# Patient Record
Sex: MALE | Race: WHITE | NOT HISPANIC OR LATINO | URBAN - METROPOLITAN AREA
[De-identification: names, ages, dates, MRNs, and addresses within clinical notes are randomized per-mention and may not be internally consistent; named-entity substitution may affect disease eponyms.]

---

## 2020-08-04 ENCOUNTER — INPATIENT (INPATIENT)
Facility: HOSPITAL | Age: 75
LOS: 14 days | Discharge: HOME HEALTH | End: 2020-08-19
Attending: STUDENT IN AN ORGANIZED HEALTH CARE EDUCATION/TRAINING PROGRAM | Admitting: STUDENT IN AN ORGANIZED HEALTH CARE EDUCATION/TRAINING PROGRAM
Payer: MEDICARE

## 2020-08-04 VITALS
RESPIRATION RATE: 18 BRPM | SYSTOLIC BLOOD PRESSURE: 167 MMHG | TEMPERATURE: 99 F | OXYGEN SATURATION: 100 % | DIASTOLIC BLOOD PRESSURE: 102 MMHG | HEART RATE: 100 BPM

## 2020-08-04 DIAGNOSIS — Z29.9 ENCOUNTER FOR PROPHYLACTIC MEASURES, UNSPECIFIED: ICD-10-CM

## 2020-08-04 DIAGNOSIS — Z94.5 SKIN TRANSPLANT STATUS: Chronic | ICD-10-CM

## 2020-08-04 DIAGNOSIS — M54.5 LOW BACK PAIN: ICD-10-CM

## 2020-08-04 DIAGNOSIS — K60.3 ANAL FISTULA: ICD-10-CM

## 2020-08-04 DIAGNOSIS — Z02.9 ENCOUNTER FOR ADMINISTRATIVE EXAMINATIONS, UNSPECIFIED: ICD-10-CM

## 2020-08-04 DIAGNOSIS — J30.2 OTHER SEASONAL ALLERGIC RHINITIS: ICD-10-CM

## 2020-08-04 LAB
ALBUMIN SERPL ELPH-MCNC: 3.6 G/DL — SIGNIFICANT CHANGE UP (ref 3.3–5)
ALP SERPL-CCNC: 107 U/L — SIGNIFICANT CHANGE UP (ref 40–120)
ALT FLD-CCNC: 12 U/L — SIGNIFICANT CHANGE UP (ref 4–41)
ANION GAP SERPL CALC-SCNC: 12 MMO/L — SIGNIFICANT CHANGE UP (ref 7–14)
APTT BLD: 35.8 SEC — SIGNIFICANT CHANGE UP (ref 27–36.3)
AST SERPL-CCNC: 15 U/L — SIGNIFICANT CHANGE UP (ref 4–40)
BASOPHILS # BLD AUTO: 0.07 K/UL — SIGNIFICANT CHANGE UP (ref 0–0.2)
BASOPHILS NFR BLD AUTO: 0.6 % — SIGNIFICANT CHANGE UP (ref 0–2)
BILIRUB SERPL-MCNC: 0.7 MG/DL — SIGNIFICANT CHANGE UP (ref 0.2–1.2)
BLD GP AB SCN SERPL QL: NEGATIVE — SIGNIFICANT CHANGE UP
BUN SERPL-MCNC: 11 MG/DL — SIGNIFICANT CHANGE UP (ref 7–23)
CALCIUM SERPL-MCNC: 9.8 MG/DL — SIGNIFICANT CHANGE UP (ref 8.4–10.5)
CHLORIDE SERPL-SCNC: 101 MMOL/L — SIGNIFICANT CHANGE UP (ref 98–107)
CO2 SERPL-SCNC: 28 MMOL/L — SIGNIFICANT CHANGE UP (ref 22–31)
CREAT SERPL-MCNC: 0.93 MG/DL — SIGNIFICANT CHANGE UP (ref 0.5–1.3)
EOSINOPHIL # BLD AUTO: 0.06 K/UL — SIGNIFICANT CHANGE UP (ref 0–0.5)
EOSINOPHIL NFR BLD AUTO: 0.5 % — SIGNIFICANT CHANGE UP (ref 0–6)
GLUCOSE SERPL-MCNC: 104 MG/DL — HIGH (ref 70–99)
HCT VFR BLD CALC: 49.8 % — SIGNIFICANT CHANGE UP (ref 39–50)
HGB BLD-MCNC: 16.8 G/DL — SIGNIFICANT CHANGE UP (ref 13–17)
IMM GRANULOCYTES NFR BLD AUTO: 0.4 % — SIGNIFICANT CHANGE UP (ref 0–1.5)
INR BLD: 1.28 — HIGH (ref 0.88–1.17)
LYMPHOCYTES # BLD AUTO: 1.17 K/UL — SIGNIFICANT CHANGE UP (ref 1–3.3)
LYMPHOCYTES # BLD AUTO: 9.7 % — LOW (ref 13–44)
MCHC RBC-ENTMCNC: 32.2 PG — SIGNIFICANT CHANGE UP (ref 27–34)
MCHC RBC-ENTMCNC: 33.7 % — SIGNIFICANT CHANGE UP (ref 32–36)
MCV RBC AUTO: 95.4 FL — SIGNIFICANT CHANGE UP (ref 80–100)
MONOCYTES # BLD AUTO: 0.95 K/UL — HIGH (ref 0–0.9)
MONOCYTES NFR BLD AUTO: 7.9 % — SIGNIFICANT CHANGE UP (ref 2–14)
NEUTROPHILS # BLD AUTO: 9.72 K/UL — HIGH (ref 1.8–7.4)
NEUTROPHILS NFR BLD AUTO: 80.9 % — HIGH (ref 43–77)
NRBC # FLD: 0 K/UL — SIGNIFICANT CHANGE UP (ref 0–0)
PLATELET # BLD AUTO: 284 K/UL — SIGNIFICANT CHANGE UP (ref 150–400)
PMV BLD: 10 FL — SIGNIFICANT CHANGE UP (ref 7–13)
POTASSIUM SERPL-MCNC: 5.3 MMOL/L — SIGNIFICANT CHANGE UP (ref 3.5–5.3)
POTASSIUM SERPL-SCNC: 5.3 MMOL/L — SIGNIFICANT CHANGE UP (ref 3.5–5.3)
PROT SERPL-MCNC: 7.7 G/DL — SIGNIFICANT CHANGE UP (ref 6–8.3)
PROTHROM AB SERPL-ACNC: 14.5 SEC — HIGH (ref 10.6–13.6)
RBC # BLD: 5.22 M/UL — SIGNIFICANT CHANGE UP (ref 4.2–5.8)
RBC # FLD: 13.7 % — SIGNIFICANT CHANGE UP (ref 10.3–14.5)
RH IG SCN BLD-IMP: NEGATIVE — SIGNIFICANT CHANGE UP
SODIUM SERPL-SCNC: 141 MMOL/L — SIGNIFICANT CHANGE UP (ref 135–145)
WBC # BLD: 12.02 K/UL — HIGH (ref 3.8–10.5)
WBC # FLD AUTO: 12.02 K/UL — HIGH (ref 3.8–10.5)

## 2020-08-04 PROCEDURE — 99223 1ST HOSP IP/OBS HIGH 75: CPT

## 2020-08-04 PROCEDURE — 99284 EMERGENCY DEPT VISIT MOD MDM: CPT

## 2020-08-04 PROCEDURE — 74177 CT ABD & PELVIS W/CONTRAST: CPT | Mod: 26

## 2020-08-04 RX ORDER — HEPARIN SODIUM 5000 [USP'U]/ML
5000 INJECTION INTRAVENOUS; SUBCUTANEOUS EVERY 12 HOURS
Refills: 0 | Status: COMPLETED | OUTPATIENT
Start: 2020-08-04 | End: 2020-08-06

## 2020-08-04 RX ORDER — VANCOMYCIN HCL 1 G
1000 VIAL (EA) INTRAVENOUS EVERY 12 HOURS
Refills: 0 | Status: DISCONTINUED | OUTPATIENT
Start: 2020-08-05 | End: 2020-08-05

## 2020-08-04 RX ORDER — VANCOMYCIN HCL 1 G
VIAL (EA) INTRAVENOUS
Refills: 0 | Status: DISCONTINUED | OUTPATIENT
Start: 2020-08-04 | End: 2020-08-05

## 2020-08-04 RX ORDER — ENOXAPARIN SODIUM 100 MG/ML
40 INJECTION SUBCUTANEOUS DAILY
Refills: 0 | Status: DISCONTINUED | OUTPATIENT
Start: 2020-08-04 | End: 2020-08-04

## 2020-08-04 RX ORDER — PIPERACILLIN AND TAZOBACTAM 4; .5 G/20ML; G/20ML
3.38 INJECTION, POWDER, LYOPHILIZED, FOR SOLUTION INTRAVENOUS EVERY 8 HOURS
Refills: 0 | Status: DISCONTINUED | OUTPATIENT
Start: 2020-08-04 | End: 2020-08-05

## 2020-08-04 RX ORDER — LORATADINE 10 MG/1
10 TABLET ORAL DAILY
Refills: 0 | Status: DISCONTINUED | OUTPATIENT
Start: 2020-08-04 | End: 2020-08-19

## 2020-08-04 RX ORDER — SODIUM CHLORIDE 9 MG/ML
3 INJECTION INTRAMUSCULAR; INTRAVENOUS; SUBCUTANEOUS EVERY 8 HOURS
Refills: 0 | Status: DISCONTINUED | OUTPATIENT
Start: 2020-08-04 | End: 2020-08-08

## 2020-08-04 RX ORDER — VANCOMYCIN HCL 1 G
1000 VIAL (EA) INTRAVENOUS ONCE
Refills: 0 | Status: COMPLETED | OUTPATIENT
Start: 2020-08-04 | End: 2020-08-04

## 2020-08-04 RX ORDER — PIPERACILLIN AND TAZOBACTAM 4; .5 G/20ML; G/20ML
3.38 INJECTION, POWDER, LYOPHILIZED, FOR SOLUTION INTRAVENOUS ONCE
Refills: 0 | Status: COMPLETED | OUTPATIENT
Start: 2020-08-04 | End: 2020-08-04

## 2020-08-04 RX ORDER — ACETAMINOPHEN 500 MG
650 TABLET ORAL EVERY 6 HOURS
Refills: 0 | Status: DISCONTINUED | OUTPATIENT
Start: 2020-08-04 | End: 2020-08-14

## 2020-08-04 RX ADMIN — PIPERACILLIN AND TAZOBACTAM 3.38 GRAM(S): 4; .5 INJECTION, POWDER, LYOPHILIZED, FOR SOLUTION INTRAVENOUS at 18:17

## 2020-08-04 RX ADMIN — Medication 250 MILLIGRAM(S): at 21:45

## 2020-08-04 RX ADMIN — PIPERACILLIN AND TAZOBACTAM 25 GRAM(S): 4; .5 INJECTION, POWDER, LYOPHILIZED, FOR SOLUTION INTRAVENOUS at 23:15

## 2020-08-04 RX ADMIN — SODIUM CHLORIDE 3 MILLILITER(S): 9 INJECTION INTRAMUSCULAR; INTRAVENOUS; SUBCUTANEOUS at 21:42

## 2020-08-04 RX ADMIN — PIPERACILLIN AND TAZOBACTAM 200 GRAM(S): 4; .5 INJECTION, POWDER, LYOPHILIZED, FOR SOLUTION INTRAVENOUS at 17:40

## 2020-08-04 NOTE — H&P ADULT - NSICDXPASTSURGICALHX_GEN_ALL_CORE_FT
PAST SURGICAL HISTORY:  Basal Cell Carcinoma of Anterior Chest; s/p MOHS surgery 8/10 proximal to suprasternal notch     H/O skin graft

## 2020-08-04 NOTE — H&P ADULT - PROBLEM SELECTOR PLAN 6
VTE with Heparin Sub cut BID.  Fall, Aspiration, safety precautions. DVT ppx - HSQ  Diet - Regular diet  Activity - Ambulate as tolerated - Continue Claritin.

## 2020-08-04 NOTE — CONSULT NOTE ADULT - ATTENDING COMMENTS
Keisha gentlemen presenting w/ back pain and anal drainage. Concern for possible metastatic process on work-up as well as complex perianal fistula. On exam pt w/ large external opening on R side. Mild skin irritation but otherwise normal appearing skin.    - Will plan for EUA, possible biopsy, possible seton  - Pt needs colonoscopy   - Start on sitz bath, barrier cream (e.g. zinc oxide, calmoseptine) and dry gauze changed regularly

## 2020-08-04 NOTE — H&P ADULT - NSHPPHYSICALEXAM_GEN_ALL_CORE
Vital Signs Last 24 Hrs  T(C): 36.2 (04 Aug 2020 23:33), Max: 37 (04 Aug 2020 11:06)  T(F): 97.2 (04 Aug 2020 23:33), Max: 98.6 (04 Aug 2020 11:06)  HR: 75 (04 Aug 2020 23:33) (75 - 100)  BP: 144/80 (04 Aug 2020 23:33) (124/78 - 167/102)  BP(mean): --  RR: 18 (04 Aug 2020 23:33) (16 - 18)  SpO2: 97% (04 Aug 2020 23:33) (97% - 100%)

## 2020-08-04 NOTE — H&P ADULT - NEGATIVE RESPIRATORY AND THORAX SYMPTOMS
no cough/no wheezing/no hemoptysis/no dyspnea no cough/no hemoptysis/no pleuritic chest pain/no wheezing/no dyspnea

## 2020-08-04 NOTE — ED PROVIDER NOTE - OBJECTIVE STATEMENT
75yM w/pmhx basal cell carcinoma on nose presenting with low back pain and rectal discharge x 1 month. Pt states one month ago he developed low back pain, initially mild and bilateral. Reports pain is worse with standing from sitting and certain positional changes. Pt reports he then developed "rectal discharge", unable to further characterize, states throughout the day he feels drainage from the rectum. Pt states he has not had a normal BM in 1 month. Pt reports pain to hemorrhoids as well. Denies fever/chills, weakness, numbness/tingling, abdominal pain, vomiting, diarrhea, saddle anesthesia, urinary incontinence, difficulty ambulating, hx IVDU or any other concerns.

## 2020-08-04 NOTE — H&P ADULT - NEGATIVE CARDIOVASCULAR SYMPTOMS
no dyspnea on exertion/no chest pain/no palpitations/no orthopnea no chest pain/no dyspnea on exertion/no palpitations/no orthopnea/no peripheral edema

## 2020-08-04 NOTE — ED PROVIDER NOTE - ATTENDING CONTRIBUTION TO CARE
agree with above hpi  Constant rectal discharge and possible incontinence, h/o large hemorrhoids, basal cell CA.  No fevers, chills, ha, cp, sob, abd pain, vomiting, diarrhea, dysuria, weakness, numbness, urinary incontinence.   Does note moderate intermittent lower back pain positionally related, none at this time.  No weight loss, no ivdu.  on exam  GEN - NAD; well appearing; A+O x3   HEAD - NC/AT   EYES- PERRL, EOMI  ENT: Airway patent, mmm, Oral cavity and pharynx normal.    NECK: Neck supple  PULMONARY - CTA b/l, symmetric breath sounds.   CARDIAC -s1s2, RRR, no M,G,R  ABDOMEN - +BS, ND, NT, soft, no guarding, no rebound, no masses   Rectal exam as above  BACK - no CVA tenderness, Normal  spine, no midline ttp, no paraspinal ttp, no stepoff/deformity  EXTREMITIES - FROM, symmetric pulses, capillary refill < 2 seconds, no edema   SKIN - no rash or bruising   NEUROLOGIC - alert, speech clear, 5/5 strength b/l ue and le, sensation intact  a/p-patient with 1m rectal incontinence/discharge-found to have possible fistulization in region on exam with large hemorrhoids, will ct to further evaluate-abd nontender, no systemic symptoms, normal back exam.

## 2020-08-04 NOTE — ED PROVIDER NOTE - PHYSICAL EXAMINATION
Rectal: fistula with spontaneous feculent drainage noted near rectum, rectum TTP with external hemorrhoids

## 2020-08-04 NOTE — CONSULT NOTE ADULT - SUBJECTIVE AND OBJECTIVE BOX
[facility] Colorectal Surgery Consultation     Patient is a 75y old  Male who presents with a chief complaint of rectal discharge    HPI:  75M w/ pmhx basal cell carcinoma presenting with low back pain and rectal discharge. Reports lower back pain and rectal discharge began about 1 month ago. Reports low back pain was initially mild and bilateral, now worse with standing from sitting and certain positional changes. Pt reports he then developed brown rectal discharge unable to further characterize, throughout the day he notices significant feculent drainage. Pt states he has not had a normal BM in 1 month. Pt reports pain to hemorrhoids as well. Denies fever/chills, weakness, numbness/tingling, abdominal pain, vomiting, recent weight loss. Denies family history of malignancy. In ED, CT scan demonstrating complex anal fistula, possible discitis OM of lumbar spine, and lung lesions concerning for metastatic disease. Surgery consulted for evaluation.      PAST MEDICAL & SURGICAL HISTORY:  H/O: Obesity  Basal Cell Cancer; Right side distal nose  Seasonal Allergies  Basal Cell Carcinoma of Anterior Chest; s/p MOHS surgery 8/10 proximal to suprasternal notch      FAMILY HISTORY:      SOCIAL HISTORY:    MEDICATIONS  (STANDING):    MEDICATIONS  (PRN):    Allergies    dust (Rhinitis; Headache)  No Known Drug Allergies    Intolerances        Vital Signs Last 24 Hrs  T(C): 36.6 (04 Aug 2020 15:38), Max: 37 (04 Aug 2020 11:06)  T(F): 97.8 (04 Aug 2020 15:38), Max: 98.6 (04 Aug 2020 11:06)  HR: 100 (04 Aug 2020 15:38) (100 - 100)  BP: 133/72 (04 Aug 2020 15:38) (133/72 - 167/102)  BP(mean): --  RR: 18 (04 Aug 2020 15:38) (18 - 18)  SpO2: 100% (04 Aug 2020 15:38) (100% - 100%)  Daily     Daily     General: NAD, well-nourished  HEENT: Atraumatic, EOMI  Resp: Breathing comfortably on RA  CV: Normal sinus rhythm  Abd: soft, ND, nontender   Rectall: fistula tract identified at ~9 o'clock with feculent drainage, INOCENCIA limited by pain, minimal blood at anal verge                          16.8   12.02 )-----------( 284      ( 04 Aug 2020 12:50 )             49.8     08-04    141  |  101  |  11  ----------------------------<  104<H>  5.3   |  28  |  0.93    Ca    9.8      04 Aug 2020 12:50    TPro  7.7  /  Alb  3.6  /  TBili  0.7  /  DBili  x   /  AST  15  /  ALT  12  /  AlkPhos  107  08-04    PT/INR - ( 04 Aug 2020 12:50 )   PT: 14.5 SEC;   INR: 1.28          PTT - ( 04 Aug 2020 12:50 )  PTT:35.8 SEC      Radiographic Findings:       < from: CT Abdomen and Pelvis w/ IV Cont (08.04.20 @ 16:03) >  IMPRESSION:  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    Erosive changes at L4-L5, suspicious for discitis osteomyelitis.    Bilateral pulmonary nodules, suspicious for metastatic disease.                VON BAUTISTA M.D., ATTENDING RADIOLOGIST  This document has been electronically signed. Aug  4 2020  4:56PM    < end of copied text >

## 2020-08-04 NOTE — CONSULT NOTE ADULT - ASSESSMENT
75M w/ pmhx basal cell carcinoma presenting with low back pain and rectal discharge,, CT scan demonstrating complex anal fistula, possible discitis OM of lumbar spine, and lung lesions concerning for metastatic disease    - Discussed CT scan findings with radiology - concerning for malignancy of unknown origin  - Will discuss with attending/fellow on call 75M with prior basal cell carcinoma presenting with freely draining complex anal fistula without abscess, as well as possible discitis vs OM of lumbar spine, and lung lesions concerning for metastatic disease of unknown primary. Hemodynamically stable and afebrile.    - Discussed CT scan findings with radiology - concerning for malignancy of unknown origin  - Patient will require further workup for malignancy, unlikely to be related to anorectal pathology  - Likely to benefit from a seton for fistula control, will tentatively plan for EUA and possible seton this admission    Discussed with Dr. Valdez, colorectal surgery attending  --MART Munoz, [id number]

## 2020-08-04 NOTE — H&P ADULT - NEGATIVE NEUROLOGICAL SYMPTOMS
no vertigo/no difficulty walking/no loss of consciousness/no tremors/no loss of sensation/no weakness/no paresthesias/no generalized seizures/no hemiparesis/no facial palsy/no headache/no confusion/no focal seizures/no syncope

## 2020-08-04 NOTE — H&P ADULT - NEGATIVE OPHTHALMOLOGIC SYMPTOMS
no discharge L/no photophobia/no blurred vision L/no blurred vision R/no lacrimation L/no lacrimation R/no discharge R

## 2020-08-04 NOTE — H&P ADULT - PROBLEM SELECTOR PLAN 7
1.  Name of PCP:  2.  PCP Contacted on Admission: [ ] Y    [X] N    3.  PCP contacted at Discharge: [ ] Y    [ ] N    [ ] N/A  4.  Post-Discharge Appointment Date and Location:  5.  Summary of Handoff given to PCP: 1.  Name of PCP: no current PCP  2.  PCP Contacted on Admission: [ ] Y    [X] N    3.  PCP contacted at Discharge: [ ] Y    [ ] N    [ ] N/A  4.  Post-Discharge Appointment Date and Location:  5.  Summary of Handoff given to PCP: DVT ppx - HSQ  Diet - Regular diet  Activity - Ambulate as tolerated

## 2020-08-04 NOTE — H&P ADULT - PROBLEM SELECTOR PLAN 5
Continue Claritin. - Continue Claritin. - Incidentally found to have PVCs on routine EKG here, patient denies any cardiac complaints, likely would benefit from outpatient follow up with a PCP/cardiology for further evaluation

## 2020-08-04 NOTE — H&P ADULT - PROBLEM SELECTOR PLAN 3
F/U MRI Lumbar spine.  Continue Zosyn IV and Vancomycin IV added BID. F/U CT Chest for better imaging. - b/l pulmonary nodules incidentally noted on CT A/P, patient denies any current respiratory symptoms, unclear on the etiology of the nodules, will obtain CT Chest with IV contrast for further evaluation, would likely need tissue biopsy for further eval but awaiting CT Chest to assess for best biopsy site

## 2020-08-04 NOTE — H&P ADULT - NSHPLABSRESULTS_GEN_ALL_CORE
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 Erosive changes at L4-L5, suspicious for discitis osteomyelitis. Bilateral pulmonary nodules, suspicious for metastatic disease.                          16.8   12.02 )-----------( 284      ( 04 Aug 2020 12:50 )             49.8   08-04    141  |  101  |  11  ----------------------------<  104<H>  5.3   |  28  |  0.93    Ca    9.8      04 Aug 2020 12:50    TPro  7.7  /  Alb  3.6  /  TBili  0.7  /  DBili  x   /  AST  15  /  ALT  12  /  AlkPhos  107  08-04 LABS and ADDITIONAL STUDIES:                        16.8   12.02 )-----------( 284      ( 04 Aug 2020 12:50 )             49.8     Complete Blood Count + Automated Diff (08.04.20 @ 12:50)    Auto Neutrophil #: 9.72 K/uL    Auto Neutrophil %: 80.9 %    08-04    141  |  101  |  11  ----------------------------<  104<H>  5.3   |  28  |  0.93    Ca    9.8      04 Aug 2020 12:50    TPro  7.7  /  Alb  3.6  /  TBili  0.7  /  DBili  x   /  AST  15  /  ALT  12  /  AlkPhos  107  08-04    < from: CT Abdomen and Pelvis w/ IV Cont (08.04.20 @ 16:03) >  FINDINGS:  LOWER CHEST: Partially imaged large pulmonary nodules, suspicious for metastatic disease. A left lower lobe nodule measures 3.1 cm and a right lower lobe nodule measures 2.3 cm.    LIVER: Within normal limits.  BILE DUCTS: Normal caliber.  GALLBLADDER: Within normal limits.  SPLEEN: Within normal limits.  PANCREAS: Within normal limits.  ADRENALS: Within normal limits.  KIDNEYS/URETERS: Within normal limits.    BLADDER: Within normal limits.  REPRODUCTIVE ORGANS: Prostate within normal limits.    BOWEL: Complex perianal fistula with extensive inflammation. Periampullary duodenal diverticulum. No bowel obstruction. Appendix is normal. Colonic diverticulosis.  PERITONEUM: No ascites.  VESSELS: Atherosclerotic changes.  RETROPERITONEUM/LYMPH NODES: Enlarged left common iliac node (series 2, image 79), measuring 1.2 x 1.2 cm.  ABDOMINAL WALL: Fat-containing umbilical hernia.  BONES: Erosive changes along the inferior endplate of L4 and the superior endplate of L5. Degenerative changes.    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    Erosive changes at L4-L5, suspicious for discitis osteomyelitis.    Bilateral pulmonary nodules, suspicious for metastatic disease.    < end of copied text >    EKG - Sinus rhythm with PVCs, , QTc 490, no significant ST-T wave changes

## 2020-08-04 NOTE — H&P ADULT - NEGATIVE ENMT SYMPTOMS
no nasal discharge/no post-nasal discharge/no nose bleeds/no vertigo/no nasal congestion/no nasal obstruction/no sinus symptoms/no ear pain/no tinnitus no nasal obstruction/no post-nasal discharge/no dry mouth/no nasal discharge/no tinnitus/no ear pain/no nose bleeds/no nasal congestion/no sinus symptoms/no vertigo/no throat pain

## 2020-08-04 NOTE — H&P ADULT - RECTAL EXAM
Limited: Brown discharge from fistula like opening above anus. No external hemorrhoids seen at this time.

## 2020-08-04 NOTE — H&P ADULT - PROBLEM SELECTOR PLAN 8
1.  Name of PCP: no current PCP  2.  PCP Contacted on Admission: [ ] Y    [X] N    3.  PCP contacted at Discharge: [ ] Y    [ ] N    [ ] N/A  4.  Post-Discharge Appointment Date and Location:  5.  Summary of Handoff given to PCP:

## 2020-08-04 NOTE — H&P ADULT - NSICDXPASTMEDICALHX_GEN_ALL_CORE_FT
PAST MEDICAL HISTORY:  Basal Cell Cancer; Right side distal nose     H/O: Obesity     Seasonal Allergies

## 2020-08-04 NOTE — H&P ADULT - NSHPSOCIALHISTORY_GEN_ALL_CORE
.  Lives with the spouse.  ETOH: Wine 1 g/day.   Denies Nicotine history.  Retired .  Colonoscopy : denies.

## 2020-08-04 NOTE — H&P ADULT - NEGATIVE MUSCULOSKELETAL SYMPTOMS
no leg pain R/no leg pain L/no arm pain L/no arm pain R no arm pain L/no arm pain R/no leg pain L/no leg pain R/no myalgia

## 2020-08-04 NOTE — H&P ADULT - MUSCULOSKELETAL
details… detailed exam no calf tenderness/no joint warmth/no joint swelling/normal strength/no joint erythema

## 2020-08-04 NOTE — ED ADULT NURSE NOTE - OBJECTIVE STATEMENT
patient  Alert & ox3. complains of  lower back pain for past week, denies numbness tingling, pt . evaluated by MD. Placed 20g angiocath rt. AC., labs drawn and sent.  patient will be waiting for  blood results and MRI, further evaluation and disposition.  made comfortable.will continue to monitor.

## 2020-08-04 NOTE — PATIENT PROFILE ADULT - NSPROPTRIGHTSUPPORTPERSON_GEN_A_NUR
[address] [facility], [place], [address], [address], [place]       April Martin Pain[employer], LUZ Timmons, JANEE-BC   TUTU Leonard NP [facility]    at [address], [address]  22.    [phone]    FAX: 126 [facility]    at [address], [address], [address]    [phone]    FAX: [phone]         Patient Care Team:  Saulo Guerrero DO as PCP - General (Family Practice)  Mushtqa Marion MD as Physician (Oncology)  Sowmya Sexton MD as Physician (Gastroenterology)  Hailey Lester MD (Internal Medicine)      Problem List  Date Reviewed: 9/11/2018          Codes Class Noted    End stage liver disease [facility]) ICD-10-CM: K72.90  ICD-9-CM: 572.8  11/6/2018        Edema ICD-10-CM: R60.9  ICD-9-CM: 782.3  1/31/2018        Spinal cord syndrome ICD-10-CM: [id number]  ICD-9-CM: 952.9  1/16/2015        Bell's palsy ICD-10-CM: G51.0  ICD-9-CM: 351.0  1/12/2015        Anasarca ICD-10-CM: R60.1  ICD-9-CM: 782.3  1/5/2015        Cirrhosis ([facility] [address] ICD-10-CM: K74.60  ICD-9-CM: 571.5  1/5/2015        Portal vein thrombosis ICD-10-CM: L77  ICD-9-CM: 036  7/30/2012        Common variable agammaglobulinemia ([facility] [address] ICD-10-CM: D80.1  ICD-9-CM: 279.06  12/28/2011        Elevated liver enzymes ICD-10-CM: R74.8  ICD-9-CM: 790.5  12/28/2011    Overview Signed 2/12/2012  9:29 AM by Laura Aguayo MD     Secondary to Granulomatous hepatitis             Thrombocytopenia ([facility] ICD-10-CM: D69.6  ICD-9-CM: 287.5  12/28/2011        Neutropenia ([facility].) ICD-10-CM: D70.9  ICD-9-CM: 288.00  12/28/2011        Anemia ICD-10-CM: D64.9  ICD-9-CM: 285.9  12/28/2011        Diarrhea ICD-10-CM: R19.7  ICD-9-CM: 787.91  12/28/2011 Marjorie Atr returns to the [place] [address] for 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 The active problem list, all pertinent past medical history, medications, liver histology, endoscopic studies, radiologic findings and laboratory findings related to the liver disorder were reviewed with the patient. The patient is a 60 y. o.  female who was found to have abnormalities in liver enzymes in 2004. The etiology of the liver disease is unclear but it is likely related to the immune disorder with agammablobulinemia and hepatic granulomas. She was found to have cirrhosis in 2013 when an ultrasound suggested cirrhosis and and EGD demonstrated esophageal varices. The patient has developed the following complications of cirrhosis: esophageal varices, ascites, edema, hepatic encephalopathy,     The most recent imaging of the liver was MRI performed in 1/2018. Results suggest cirrhosis. The PV is patent but intrahepatic PV branches are ectatic and poorly visualized. Thre is a spontaneous spleno-renal shunt. No liver mass lesions noted. No ascites. The patient has been evaluated for liver transplant at 2 centers; [facility] and [facility]. She has been turned down as a candidate because of the increased risk of malignancy/lymphoma/LPD with immune suppression in patients with CIDS. The patient notes fatigue, swelling of the lower extremities, The edema and ascites is worse because she has been salt loading with pickles. The patient has not experienced problems concentrating, swelling of the abdomen, hematemesis, hematochezia. The patient has Moderate limitations in functional activities which can be attributed to the liver disease and to other medical problems that are not related to the liver disease. 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   The most recent laboratory studies indicate that the liver transaminases are elevated, alkaline phosphatase is elevated, total bilirubin is elevated, albumin is depressed, and the platelet count is depressed. The CTP score is 8, Child class B, MELD score 10. The patient has completed liver transplant evaluation testing. The patient was seen at [place], [place]. The patient was also seen at [facility]. The patient is not a candidate for liver transplantation because of CIDS and the increased risk of post-LT LPD. Ascites   This is persistent but stable on current dose of step 4 diuretics. The patient was counseled regarding the need to maintain sodium restriction and the types of foods containing high amounts of sodium to be A Heart ECHO in 1/2018 was normal.  May need to consider TIPS. Lower extremity edema   This is refractory to step 4 diuretics. Her edema is much greater on right >> left. She has been evaluated for DVT at [facility] with doppler. This was negative. Screat is normal.  May need to consider TIPS. The last ECHO she had was in 10/2016. Will repeat this. Esophageal varicies   These are small and without prior bleeding. Hepatic encephalopathy    This is controlled on current doses of lactulose and Xifaxan. No need to restrict dietary protein at this time. Portal vein thrombosis  There is cavernous transformation of the PV. There is a spontaneous-spleno-renal shut. Screening for Hepatocellular Carcinoma  HCC screening has recently been performed and does not suggest [address] The next liver imaging study will be performed in 10/2018. Treatment of other medical problems in patients with chronic liver disease  There are no contraindications for the patient to take any medications that are necessary for treatment of other medical issues. The patient has cirrhosis. Patients with cirrhosis should not use NSAIDs if possible as this is associated with a higher rate of SINDY.         Counseling for alcohol in patients with chronic liver disease  The patient has cirrhosis and was advised to be abstinent from all alcohol including non-alcoholic beer which does contain some alcohol. Thrombocytopenia   This is secondary to cirrhosis. There is no evidence of overt bleeding. No treatment is required. Anemia   This is due to multifactorial causes including immune deficiency syndrome, portal hypertension with chronic GI blood loss and iron deficiency. Will obtain iron panel to assess for iron stores. Osteoporosis  The risk of osteoporosis is increased in patients with cirrhosis. DEXA bone density to assess for osteoporosis was performed in 3/2018 as part of the liver transplant evalaution. The results demonstrate osteoporosis. The patient should be started on a bisphosphonate. Vaccinations   Vaccination for viral hepatitis A and B is not needed. The patient has serologic evidence of prior exposure or vaccination with immunity. Routine vaccinations against other bacterial and viral agents can be performed as indicated. Annual flu vaccination should be administered if indicated. ALLERGIES:  Allergies   Allergen Reactions    Adhesive Tape-Silicones Other (comments)     redness of skin       Current Outpatient Medications   Medication Sig    calcium carbonate/vitamin D3 (CALTRATE 600 + D PO) Take 1 Tab by mouth daily.  multivitamin (ONE A DAY) tablet Take 1 Tab by mouth daily.  melatonin 5 mg cap capsule Take 10 mg by mouth nightly as needed.  trimethoprim-sulfamethoxazole (BACTRIM DS, SEPTRA DS) 160-800 mg per tablet Take 1 Tab by mouth daily.  IMMUNE GLOBULIN,MOSES,IGG,, WHEY (GAMMA IMMUNE GLOB FROM WHEY PO) by IntraVENous route. Every 4 weeks - last treatment  6/26/14    DIPHENHYDRAMINE HCL (BENADRYL ALLERGY PO) Take  by mouth.  Is given every 4 weeks during her injection treatments - IV    furosemide (LASIX) 40 mg tablet TAKE 4 TABLETS BY MOUTH EVERY DAY    guaiFENesin ER (MUCINEX) 600 mg ER tablet Take 1 Tab by mouth every twelve (12) hours.  buPROPion XL (WELLBUTRIN XL) 300 mg XL tablet Take 300 mg by mouth daily.  furosemide (LASIX) 40 mg tablet Take 120 mg by mouth daily.  memantine (NAMENDA) 10 mg tablet Take 10 mg by mouth two (2) times a day.  spironolactone (ALDACTONE) 100 mg tablet Take 300 mg by mouth daily.  ergocalciferol (ERGOCALCIFEROL) 50,000 unit capsule Take 50,000 Units by mouth every Monday.  loperamide (IMODIUM) 2 mg capsule Take 2 mg by mouth four (4) times daily as needed for Diarrhea.  polyvinyl alcohol (LIQUIFILM TEARS) 1.4 % ophthalmic solution Administer 1 Drop to both eyes as needed. No current facility-administered medications for this visit. REVIEW OF SYSTEMS:  Constitution systems: Negative for fever, chills, weight gain, weight loss. Eyes: Negative for diplopia, visual changes, eye pain. ENT: Negative for sore throat, painful swallowing. Respiratory: Negative for cough, hemoptysis, dyspnea. Cardiology: Negative for chest pain, palpitations. GI:  Positive for diarrhea. : Negative for urinary frequency, dysuria and hematuria. Skin: Negative for rash. Hematology: Negative for easy bruising, bleeding from gums or nose. Musculo-skelatal: Negative for back pain, muscle pain, weakness. LLE paresthesias. Neurologic:  Poor memory   Psychology: Negative for anxiety, depression. FAMILY HISTORY  The father is alive and has scleroderma. The mom is alive and healthy  There is a sister with non-hodgkins lymphoma. There is no family history of liver disease. SOCIAL HISTORY:  The patient is . There are 3 children. The patient has never smoked tobacco products. The patient consumes alcohol on social occasions never in excess. The patient currently works part time in a [employer].     PHYSICAL EXAMINATION:  Visit Vitals  /64 (BP 1 Location: Right arm, BP Patient Position: Sitting) Pulse 90   Temp 98.3 °F (36.8 °C) (Tympanic)   Resp 20   Ht 5' 8\" (1.727 m)   Wt 207 lb (93.9 kg)   SpO2 98%   BMI 31.47 kg/m²       General: No acute distress. Eyes: Sclera anicteric. ENT: No oral lesions, thyroid normal.  Nodes: No adenopathy. Skin: No spider angiomata,  No jaundice. Palmar erythema is present. Respiratory: Lungs clear to auscultation. Cardiovascular: Regular heart rate, systolic murmur, no JVD. Abdomen: Soft, non-tender. No ascites present. Extremities: 2+ peripheral edema extending to the knees, no muscle wasting, no gross arthritic changes. Neurologic: Alert and oriented, cranial nerves grossly intact, no asterixsis. LABORATORY STUDIES:   Liver [place] of [address] [place] [address] & Units 6/20/2018 5/21/2018   WBC 3.4 - 10.8 x10E3/uL 1.9 (LL) 1.9 (LL)   ANC 1.4 - 7.0 x10E3/uL 1.1 (L) 0.9 (L)   HGB 11.1 - 15.9 g/dL 10.2 (L) 10.1 (L)    - 379 x10E3/uL 58 (LL) 74 (LL)   INR 0.8 - 1.2 1.1 1.1   AST 0 - 40 IU/L 85 (H) 86 (H)   ALT 0 - 32 IU/L 80 (H) 72 (H)   Alk Phos 39 - 117 IU/L 306 (H) 264 (H)   Bili, Total 0.0 - 1.2 mg/dL 2.3 (H) 3.4 (H)   Bili, Direct 0.00 - 0.40 mg/dL 1.14 (H) 1.46 (H)   Albumin 3.6 - 4.8 g/dL 3.0 (L) 2.9 (L)   BUN 8 - 27 mg/dL 17 15   Creat 0.57 - 1.00 mg/dL 0.80 0.89   Na 134 - 144 mmol/L 132 (L) 133 (L)   K 3.5 - 5.2 mmol/L 3.5 4.8   Cl 96 - 106 mmol/L 96 99   CO2 20 - 29 mmol/L 21 22   Glucose 65 - 99 mg/dL 87 79     SEROLOGIES:  12/2011: HAV total positive, HBsAntigen negative, anti-HBcore positive, anti-HBsurface positive, anti-HCV negative, Ferritin 18, iron saturation 9%, GÓMEZ negative, ASMA negative, alpha-1-antitrypsin 215.  1/2018. CMV IgG positive, CMV IgM negative, EBV IgG positive, anti-HIV negative    LIVER HISTOLOGY:  2004. Reported to demonstrate granulomas and no fibrosis. 12/2011: Numerous granulomas with bridging fibrosis. Reviewed by MLS. 11/2017. Sheer wave elastography performed at [facility].   E Range: 7.37-12.25 kPa, E Mean: 9.86 kPa, E Median: 9.87 kPa, E Std: 1.51 kPa. The results suggested a fibrosis level of F3. ENDOSCOPIC PROCEDURES:  02/12:  EGD per Dr. Manish Ramírez - grade 1 esophageal varices. No gastric varices noted. 08/2012:  EGD per MLS - small esophageal varices, flattened on insufflation, No banding performed. 10/2013: EGD per MLS - two large esophageal varices. Three bands applied. Mild portal hypertensive gastropathy. Repeat in 3 months.   1/2014:  EGD per MLS. Small esophageal varices. No gastric varices noted. Mild portal hypertensive gastropathy. Repeat in 6 months.   7/2014:  EGD per MLS. Moderate portal hypertensive gastropathy. Two medium varices. Banding of one varix was performed. Repeat in one year. 7/2015: EGD per MLS. Moderate portal hypertensive gastropathy. Small esophageal varices. Repeat in one year. 8/2016: EGD per Dr. Yudelka Branch. Mild portal hypertensive gastropathy. Small esophageal varices. Repeat in one year. 12/2017. EGD performed by MLS. Small esophageal varices. No gastric varices. Moderate portal gastropathy. RADIOLOGY:  11/2010: CT scan abdomen. Normal appearing liver. No liver mass lesion. Abdominal adenopathy. Splenomegally. No ascites. 7/2012. CT scan at [facility]. Reported to demonstrate PVT. No liver mass lesions. 09/2012:  Abdominal ultrasound. No venous thrombus identified. Splenic and portal veins dilated. No focal mass. 04/2013:  Abdominal ultrasound. Slightly coarse in echotexture and minimally lobulated. No focal mass. Portal vein enlarged measuring nearly 3 cm in diameter but remains hepatopedal in flow direction. Splenic vein also significantly enlarged. 10/2013:  Ultrasound of liver. Lobulated and heterogeneously coarse in echotexture compatible with underlying cirrhosis. No focal mass. Dilated portal vein but hepatopedal flow. Splenic vein also enlarged. 4/2014: Ultrasound of liver. Increased echogenicity, consistent with cirrhosis.  No evidence of focal mass or lesion. Dilated portal vein but hepatopedal flow. Splenic vein dilated with a nonocclusive thrombus present. 11/2014. Ultrasound of liver. Echogenic consistent with cirrhosis. No liver mass lesions. No dilated bile ducts. No ascites. Portal vein thrombosis with extension into right PV compared to prior exam.  1/2015: CT of the abdomen. Cirrhosis and portal hypertension with nonocclusive main portal vein planned thrombus identified. Nearly occlusive bland thrombus also identified within the enlarged splenic vein. No mass noted. 07/2015: Ultrasound of the liver. PVT and SVT that are non-occlusive. No mass or ascites noted. 1/2016: Ultrasound of the liver. Normal in size. Heterogeneous hepatic echotexture with lobular hepatic  contour compatible with cirrhosis. There is nonocclusive chronic thrombus within main portal vein with normal directional flow. Decreased flow in ascending left portal vein. Main portal vein diameter 1.5 cm.  1/2016: MRI of the liver. Chronic nonocclusive right main portal venous thrombosis. No evidence of  splenic venous thrombosis. No hepatic lesions noted. 7/2016: Ultrasound of the abdomen. The liver is a small size. Clinical dimension is 12 cm. Echotexture is  heterogenous consistent with diffuse hepatocellular disease/cirrhosis. No discrete hepatic mass however are seen. 1/2017. Ultrasound of liver. Echogenic consistent with cirrhosis. No liver mass lesions. No dilated bile ducts. No ascites. 11/2017. Ultrasound of liver. Echogenic consistent with cirrhosis. No Liver mass lesions. Mild ascites. 4/2018. Dynamic MRI liver. Changes consistent with cirrhosis. No liver mass lesions. No dilated bile ducts. No bile duct strictures. Moderate ascites. Cavernous transformation of PV. Spontaneous spleno-renal shunt. OTHER TESTING:  10/2016. ECHO heart. LVEF 60-65%, Normal valves. No pulmonary HTN  1/2018.   TSH 0.93, T3 total 89, T4 free 1.2  1/2018. TB Quant Gold negative  1/2018. ECHO heart. Normal LVEF. Normal RV. No pul HTN. No TR.  2/218. ETOH borderline positive  2/2108. Drug screen negative  2/108. Lexicon nuclear stress test.  Negative for ischemia. EF 57%  3/2018. ABG on room air: 73/32/7.5;  ABG on 100% O2: 594/42/7.49; shunt fraction 4.4%  3/2018. FEV1 76% of predicted, FEV1% 99% of predicted, DLCO 58% of predicted. 3/2018. DEXA scan. Severe osteoporosis with high fracture risk. FOLLOW-UP:  All of the issues listed above in the Assessment and Plan were discussed with the patient. All questions were answered. The patient expressed a clear understanding of the above. [address] [place] [address] in 3 weeks for routine monitoring.       MD Shira Honeycutt [address] of [address] of [place] [address] [place], [address]  [phone] declines

## 2020-08-04 NOTE — H&P ADULT - HISTORY OF PRESENT ILLNESS
75M, self ambulating, history of seasonal allergies and Basal cell carcinoma with surgical reconstruction of the nose and skin grafts harvested from R forehead, done 2010, experiencing exertional low back pain felt on ambulation and bending over. Pt also noticed 4 weeks of brown/ fecal discharge coming from above the anus and rectal pain felt while straining during BM, he attributes to external hemorrhoids.  Denies fever, chills, generalized body aches, dizziness, HA, SOB, chest pain, palpitations, nausea, vomit, diarrhea, constipation, BRBPR, melena, abdominal pain, bowel/ bladder incontinence.   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 Erosive changes at L4-L5, suspicious for discitis osteomyelitis. Bilateral pulmonary nodules, suspicious for metastatic disease.    The pt was started on Zosyn 3.375 gram IV. Surgery consult and recommendation are in the chart.  Currently the pt is rectal pain free, last rectal discharge was 8/4 @17:00.    Vitals: Tmax= 98.6 oral, HR= 94b/ min, BP = 124/ 78, RR= 16b/ min, SPO2=- 100% ra. This is a 75M with history of Basal Cell Carcinoma of nose s/p surgical resection with nasal reconstruction who presents to the hospital with complaints of perianal rectal discharge and lower back pain x4 weeks. Said that he initially noted feculent discharge from around his anal area about 4 weeks ago, was intermittent and therefore had not presented to a doctor for evaluation. Said that he also had some lower back pain that started around the same time. Over the past week his symptoms worsened and he therefore came to the hospital for evaluation. Currently said that he notes feculent discharge from his rectal area without any significant rectal pain, denies any associated blood or purulence with the feculent discharge. Still has lower back pain that he said is mild in nature. Denies any associated fevers/chills, diaphoresis, or other infectious complaints.     On arrival to the ED, his vitals were T 98.6, P 100, /102, RR 18, O2 sat 100% RA. His lab work showed leukocytosis with neutrophilia and mild coagulopathy. His CT A/P showed a complex perianal fistula with fat stranding, erosive changes at L4-L5 concerning for discitis/osteomyelitis, and b/l pulmonary nodules. He was givne jennifer/zosyn. He was evaluated by general surgery and recommended that the patient be admitted to medicine for malignancy work up. Patient was then admitted to medicine.

## 2020-08-04 NOTE — H&P ADULT - GASTROINTESTINAL COMMENTS
Brown/ fecal discharge coming from above the anus and rectal pain felt while straining during BM. Denies BRBPR and bowel incontinence.

## 2020-08-04 NOTE — ED PROVIDER NOTE - CLINICAL SUMMARY MEDICAL DECISION MAKING FREE TEXT BOX
75yM w/pmhx basal cell carcinoma on nose presenting with low back pain and rectal discharge x 1 month. On exam pt has rectal fistula with spontaneous drainage. NV intact, no midline spinal tenderness. Will check cbc/cmp, CT abd/pelvis to evaluate for likely fistula tract.

## 2020-08-04 NOTE — H&P ADULT - ASSESSMENT
75M history of basal cell carcinoma 2010, presenting with low back pain and rectal discharge,, CT scan demonstrating complex anal fistula, possible discitis OM of lumbar spine, and lung lesions concerning for metastatic disease.    Surgery consult appreciated This is a 75M with history of Basal Cell Carcinoma of the nose s/p surgical excision who presents to the hospital with perirectal feculent drainage and lower back pain found to have perianal fistula and suspicion of lumbar vertebral OM. Also with pulmonary nodules of unclear etiology.

## 2020-08-04 NOTE — H&P ADULT - NEUROLOGICAL DETAILS
alert and oriented x 3/responds to verbal commands/no spontaneous movement/sensation intact/normal strength normal strength/responds to verbal commands/sensation intact/alert and oriented x 3

## 2020-08-04 NOTE — ED PROVIDER NOTE - PROGRESS NOTE DETAILS
TAYLER العراقي: Spoke with surgery, will see pt in the ED TAYLER العراقي: Pt seen by surgery recommending medicine admission. Spoke with hospitalist who accepts pt. Text paged MAR. Pt agrees with plan for admission

## 2020-08-04 NOTE — H&P ADULT - PROBLEM SELECTOR PLAN 2
F/U CT Chest, CEA, Ca 125 Plan per surgery: - Likely to benefit from a seton for fistula control, will tentatively plan for EUA and possible seton this admission.  F/U TSH, A1C, Lipid P - Seen by general surgery, possible seton placement this admission but no definite plans laid out currently  - Currently the fistula with draining feculent material, no pain, no redness associated  - Will c/w empiric abx, f/u further surgery recommendations

## 2020-08-04 NOTE — H&P ADULT - RS GEN PE MLT RESP DETAILS PC
no wheezes/breath sounds equal/respirations non-labored/no chest wall tenderness/no intercostal retractions/no rhonchi/no rales/no subcutaneous emphysema/clear to auscultation bilaterally/good air movement/airway patent

## 2020-08-04 NOTE — H&P ADULT - NEGATIVE GENERAL GENITOURINARY SYMPTOMS
no dysuria/no incontinence no flank pain L/no incontinence/normal urinary frequency/no flank pain R/no dysuria

## 2020-08-05 DIAGNOSIS — M46.26 OSTEOMYELITIS OF VERTEBRA, LUMBAR REGION: ICD-10-CM

## 2020-08-05 DIAGNOSIS — I49.3 VENTRICULAR PREMATURE DEPOLARIZATION: ICD-10-CM

## 2020-08-05 DIAGNOSIS — R91.8 OTHER NONSPECIFIC ABNORMAL FINDING OF LUNG FIELD: ICD-10-CM

## 2020-08-05 LAB
ANION GAP SERPL CALC-SCNC: 16 MMO/L — HIGH (ref 7–14)
APPEARANCE UR: CLEAR — SIGNIFICANT CHANGE UP
BILIRUB UR-MCNC: NEGATIVE — SIGNIFICANT CHANGE UP
BLOOD UR QL VISUAL: NEGATIVE — SIGNIFICANT CHANGE UP
BUN SERPL-MCNC: 9 MG/DL — SIGNIFICANT CHANGE UP (ref 7–23)
CALCIUM SERPL-MCNC: 9.6 MG/DL — SIGNIFICANT CHANGE UP (ref 8.4–10.5)
CHLORIDE SERPL-SCNC: 97 MMOL/L — LOW (ref 98–107)
CO2 SERPL-SCNC: 26 MMOL/L — SIGNIFICANT CHANGE UP (ref 22–31)
COLOR SPEC: YELLOW — SIGNIFICANT CHANGE UP
CREAT SERPL-MCNC: 0.81 MG/DL — SIGNIFICANT CHANGE UP (ref 0.5–1.3)
ERYTHROCYTE [SEDIMENTATION RATE] IN BLOOD: 55 MM/HR — HIGH (ref 1–15)
GLUCOSE SERPL-MCNC: 95 MG/DL — SIGNIFICANT CHANGE UP (ref 70–99)
GLUCOSE UR-MCNC: NEGATIVE — SIGNIFICANT CHANGE UP
HBA1C BLD-MCNC: 5.3 % — SIGNIFICANT CHANGE UP (ref 4–5.6)
HCT VFR BLD CALC: 48.5 % — SIGNIFICANT CHANGE UP (ref 39–50)
HCV AB S/CO SERPL IA: 0.09 S/CO — SIGNIFICANT CHANGE UP (ref 0–0.99)
HCV AB SERPL-IMP: SIGNIFICANT CHANGE UP
HGB BLD-MCNC: 15.6 G/DL — SIGNIFICANT CHANGE UP (ref 13–17)
KETONES UR-MCNC: SIGNIFICANT CHANGE UP
LEUKOCYTE ESTERASE UR-ACNC: NEGATIVE — SIGNIFICANT CHANGE UP
MAGNESIUM SERPL-MCNC: 2.1 MG/DL — SIGNIFICANT CHANGE UP (ref 1.6–2.6)
MCHC RBC-ENTMCNC: 31 PG — SIGNIFICANT CHANGE UP (ref 27–34)
MCHC RBC-ENTMCNC: 32.2 % — SIGNIFICANT CHANGE UP (ref 32–36)
MCV RBC AUTO: 96.4 FL — SIGNIFICANT CHANGE UP (ref 80–100)
NITRITE UR-MCNC: NEGATIVE — SIGNIFICANT CHANGE UP
NRBC # FLD: 0 K/UL — SIGNIFICANT CHANGE UP (ref 0–0)
PH UR: 6 — SIGNIFICANT CHANGE UP (ref 5–8)
PHOSPHATE SERPL-MCNC: 4.1 MG/DL — SIGNIFICANT CHANGE UP (ref 2.5–4.5)
PLATELET # BLD AUTO: 269 K/UL — SIGNIFICANT CHANGE UP (ref 150–400)
PMV BLD: 9.7 FL — SIGNIFICANT CHANGE UP (ref 7–13)
POTASSIUM SERPL-MCNC: 3.6 MMOL/L — SIGNIFICANT CHANGE UP (ref 3.5–5.3)
POTASSIUM SERPL-SCNC: 3.6 MMOL/L — SIGNIFICANT CHANGE UP (ref 3.5–5.3)
PROT UR-MCNC: NEGATIVE — SIGNIFICANT CHANGE UP
RBC # BLD: 5.03 M/UL — SIGNIFICANT CHANGE UP (ref 4.2–5.8)
RBC # FLD: 13.8 % — SIGNIFICANT CHANGE UP (ref 10.3–14.5)
SARS-COV-2 RNA SPEC QL NAA+PROBE: SIGNIFICANT CHANGE UP
SODIUM SERPL-SCNC: 139 MMOL/L — SIGNIFICANT CHANGE UP (ref 135–145)
SP GR SPEC: 1.02 — SIGNIFICANT CHANGE UP (ref 1–1.04)
UROBILINOGEN FLD QL: HIGH
WBC # BLD: 8.15 K/UL — SIGNIFICANT CHANGE UP (ref 3.8–10.5)
WBC # FLD AUTO: 8.15 K/UL — SIGNIFICANT CHANGE UP (ref 3.8–10.5)

## 2020-08-05 PROCEDURE — 72158 MRI LUMBAR SPINE W/O & W/DYE: CPT | Mod: 26

## 2020-08-05 PROCEDURE — 71260 CT THORAX DX C+: CPT | Mod: 26

## 2020-08-05 PROCEDURE — 99233 SBSQ HOSP IP/OBS HIGH 50: CPT

## 2020-08-05 PROCEDURE — 99222 1ST HOSP IP/OBS MODERATE 55: CPT

## 2020-08-05 PROCEDURE — 99233 SBSQ HOSP IP/OBS HIGH 50: CPT | Mod: GC

## 2020-08-05 RX ORDER — ZINC OXIDE 200 MG/G
1 OINTMENT TOPICAL EVERY 8 HOURS
Refills: 0 | Status: DISCONTINUED | OUTPATIENT
Start: 2020-08-05 | End: 2020-08-19

## 2020-08-05 RX ORDER — ZINC OXIDE 200 MG/G
1 OINTMENT TOPICAL
Refills: 0 | Status: DISCONTINUED | OUTPATIENT
Start: 2020-08-05 | End: 2020-08-05

## 2020-08-05 RX ADMIN — HEPARIN SODIUM 5000 UNIT(S): 5000 INJECTION INTRAVENOUS; SUBCUTANEOUS at 06:37

## 2020-08-05 RX ADMIN — SODIUM CHLORIDE 3 MILLILITER(S): 9 INJECTION INTRAMUSCULAR; INTRAVENOUS; SUBCUTANEOUS at 21:41

## 2020-08-05 RX ADMIN — PIPERACILLIN AND TAZOBACTAM 25 GRAM(S): 4; .5 INJECTION, POWDER, LYOPHILIZED, FOR SOLUTION INTRAVENOUS at 06:33

## 2020-08-05 RX ADMIN — Medication 250 MILLIGRAM(S): at 05:14

## 2020-08-05 RX ADMIN — SODIUM CHLORIDE 3 MILLILITER(S): 9 INJECTION INTRAMUSCULAR; INTRAVENOUS; SUBCUTANEOUS at 06:39

## 2020-08-05 NOTE — PROGRESS NOTE ADULT - PROBLEM SELECTOR PLAN 2
- Seen by general surgery, possible seton placement this admission but no definite plans laid out currently  - Currently the fistula with draining feculent material, no pain, no redness associated  - Will c/w empiric abx, f/u further surgery recommendations - Seen by general surgery, possible seton placement this admission but no definite plans laid out currently  - Currently the fistula with draining feculent material, no pain, no redness associated  - Will c/w empiric abx, f/u further surgery recommendations  -Surg recs pending

## 2020-08-05 NOTE — PROGRESS NOTE ADULT - ASSESSMENT
This is a 75M with history of Basal Cell Carcinoma of the nose s/p surgical excision who presents to the hospital with perirectal feculent drainage and lower back pain found to have perianal fistula and suspicion of lumbar vertebral OM. Also with pulmonary nodules of unclear etiology.

## 2020-08-05 NOTE — PROGRESS NOTE ADULT - PROBLEM SELECTOR PLAN 1
- Symptoms started around the same time patient's perianal discharge started, unclear if related to perianal fistula noted on CT or separate -> will obtain MRI lumbar spine for further evaluation  - Will c/w empiric abx at this time  - No current neurological deficits at present, will monitor on neuro checks q4h for now  - Consider spine eval in AM - Symptoms started around the same time patient's perianal discharge started, unclear if related to perianal fistula noted on CT or separate  - Per ID, abx not needed at this time, likely mets. MRI Pending  - No current neurological deficits at present, will monitor on neuro checks q4h for now

## 2020-08-05 NOTE — PROGRESS NOTE ADULT - PROBLEM SELECTOR PLAN 5
- Incidentally found to have PVCs on routine EKG here, patient denies any cardiac complaints, likely would benefit from outpatient follow up with a PCP/cardiology for further evaluation

## 2020-08-05 NOTE — PROGRESS NOTE ADULT - ATTENDING COMMENTS
Patient seen and examined, case d/w house staff.  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  Denies fever, +weight loss. No prior colonoscopy.    Assessment/plan:  # perianal fistula with feculent drainage: surgery consulted, possible seton placement this admission, f/u surgery  # L4-L5 erosive changes: MRI Lumbar spine, check inflammatory markers (ESR 55, CRP)  ID consult appreciated, monitor off abx  # b/l pulmonary nodules: CT chest  pt may need IR lung/spine bx to r/o malignancy/OM  # DVT ppx

## 2020-08-05 NOTE — CONSULT NOTE ADULT - SUBJECTIVE AND OBJECTIVE BOX
"HPI: This is a 75M with history of Basal Cell Carcinoma of nose s/p surgical resection with nasal reconstruction who presents to the hospital with complaints of perianal rectal discharge and lower back pain x4 weeks. Said that he initially noted feculent discharge from around his anal area about 4 weeks ago, was intermittent and therefore had not presented to a doctor for evaluation. Said that he also had some lower back pain that started around the same time. Over the past week his symptoms worsened and he therefore came to the hospital for evaluation. Currently said that he notes feculent discharge from his rectal area without any significant rectal pain, denies any associated blood or purulence with the feculent discharge. Still has lower back pain that he said is mild in nature. Denies any associated fevers/chills, diaphoresis, or other infectious complaints.     On arrival to the ED, his vitals were T 98.6, P 100, /102, RR 18, O2 sat 100% RA. His lab work showed leukocytosis with neutrophilia and mild coagulopathy. His CT A/P showed a complex perianal fistula with fat stranding, erosive changes at L4-L5 concerning for discitis/osteomyelitis, and b/l pulmonary nodules. He was givne vanc/zosyn. He was evaluated by general surgery and recommended that the patient be admitted to medicine for malignancy work up. Patient was then admitted to medicine. (04 Aug 2020 21:24)"    Above reviewed. I saw/spoke to patient. Patient states noted fecal discharge from perianal area for past 6 weeks. Patient states this has never occurred previously. No FH history of IBD or Crohn's disease. No fevers, no chills. No other new complaints. Patient complains of low back pain that started at similar timeframe. No chest pain, no cough, no abd pain, no dysuria. No other new complaints. Patient has not noted any bloody discharge. ID called for further eval.    PAST MEDICAL & SURGICAL HISTORY:  H/O: Obesity  Basal Cell Cancer; Right side distal nose  Seasonal Allergies  H/O skin graft  Basal Cell Carcinoma of Anterior Chest; s/p MOHS surgery 8/10 proximal to suprasternal notch    Allergies    dust (Rhinitis; Headache)  No Known Drug Allergies    ANTIMICROBIALS:  piperacillin/tazobactam IVPB.. 3.375 every 8 hours  vancomycin  IVPB    vancomycin  IVPB 1000 every 12 hours    OTHER MEDS:  acetaminophen   Tablet .. 650 milliGRAM(s) Oral every 6 hours PRN  heparin   Injectable 5000 Unit(s) SubCutaneous every 12 hours  loratadine 10 milliGRAM(s) Oral daily  sodium chloride 0.9% lock flush 3 milliLiter(s) IV Push every 8 hours    SOCIAL HISTORY: No tobacco, no alcohol, no illicit drugs    FAMILY HISTORY:  No history of Crohn's or IBD in first degree relatives    Drug Dosing Weight  Height (cm): 177.8 (04 Aug 2020 21:24)  Weight (kg): 77.1 (04 Aug 2020 21:24)  BMI (kg/m2): 24.4 (04 Aug 2020 21:24)  BSA (m2): 1.95 (04 Aug 2020 21:24)    PE:    Vital Signs Last 24 Hrs  T(C): 36.5 (05 Aug 2020 05:12), Max: 36.9 (04 Aug 2020 21:04)  T(F): 97.7 (05 Aug 2020 05:12), Max: 98.4 (04 Aug 2020 21:04)  HR: 70 (05 Aug 2020 05:12) (70 - 100)  BP: 115/61 (05 Aug 2020 05:12) (115/61 - 144/80)  RR: 18 (05 Aug 2020 05:12) (16 - 18)  SpO2: 99% (05 Aug 2020 05:12) (97% - 100%)    Gen: AOx3, NAD, non-toxic, pleasant  CV: S1+S2 normal, nontachycardic  Resp: Clear bilat, no resp distress, no crackles/wheezes  Abd: Soft, nontender, +BS  Ext: No LE edema, no wounds  : Stool discharge from bethany-anal defect noted  IV/Skin: No thrombophlebitis  Msk: No pain to palpation/percussion low back (at area of described pain)  Neuro: No sensory deficits, no motor deficits    LABS:                        15.6   8.15  )-----------( 269      ( 05 Aug 2020 06:55 )             48.5     08-05    139  |  97<L>  |  9   ----------------------------<  95  3.6   |  26  |  0.81    Ca    9.6      05 Aug 2020 06:55  Phos  4.1     08-05  Mg     2.1     08-05    TPro  7.7  /  Alb  3.6  /  TBili  0.7  /  DBili  x   /  AST  15  /  ALT  12  /  AlkPhos  107  08-04    MICROBIOLOGY:    8/4 COVID neg    RADIOLOGY:    8/4 CT:    IMPRESSION:  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    Erosive changes at L4-L5, suspicious for discitis osteomyelitis.    Bilateral pulmonary nodules, suspicious for metastatic disease.

## 2020-08-05 NOTE — CONSULT NOTE ADULT - ASSESSMENT
75 M with history of Basal Cell Carcinoma of nose s/p surgical resection with nasal reconstruction who presents to the hospital with complaints of perianal rectal discharge and lower back pain x4 weeks.  Leukocytosis, no fever  Feculent perianal discharge and low back pain  CT with concern for possible L1 OM, perianal fistula, pulmonary nodules  Uncertain if active infection, concern that radiographic findings all due to metastatic malignancy  Would hold antibiotics for present time while undergoing workup  Overall,  1) L1 Spinal lesion  - Possible OM, but would be more suspicious for metastasis considering clinical setting  - Check BCXs x 2  - DC Vanco/Zosyn, monitor off abx for present time  - Would check MRI L spine +/- T and C spines to evaluate for quality/etiology of spinal lesion  - Decision on lesion biopsy depending on MRI and other malignancy workup  2) Fistula  - Unclear significance of fat stranding, for now would hold abx and observe  - Low threshold to restart Zosyn if signs infection/sepsis  - F/U surgery  3) Leukocytosis  - Trend to normal    Champ Segovia MD  Pager [phone]  After 5pm and on weekends call [phone] 75 M with history of Basal Cell Carcinoma of nose s/p surgical resection with nasal reconstruction who presents to the hospital with complaints of perianal rectal discharge and lower back pain x4 weeks.  Leukocytosis, no fever  Feculent perianal discharge and low back pain  CT with concern for possible L1 OM, perianal fistula, pulmonary nodules  Uncertain if active infection, concern that radiographic findings all due to metastatic malignancy  Would hold antibiotics for present time while undergoing workup  Overall,  1) L1 Spinal lesion  - Possible OM, but would be more suspicious for metastasis considering clinical setting  - Check BCXs x 2  - DC Vanco/Zosyn, monitor off abx for present time  - Would check MRI L spine +/- T and C spines to evaluate for quality/etiology of spinal lesion  - Decision on lesion biopsy depending on MRI and other malignancy workup  2) Fistula  - Unclear significance of fat stranding, for now would hold abx and observe  - Low threshold to restart Zosyn if signs infection/sepsis  - F/U surgery  3) Leukocytosis  - Trend to normal    Discussed case with medicine team    Champ Segovia MD  Pager [phone]  After 5pm and on weekends call [phone]

## 2020-08-05 NOTE — PROGRESS NOTE ADULT - SUBJECTIVE AND OBJECTIVE BOX
Patient still reports some bethany-anal discomfort, primarily complaining of back pain.   Received MRI today.     Well appearing, awake and alert and ambulating   abd soft, NT, ND  Rectal exam: fistula spontaneously draining stool, no induration, no erythema       Recommendations:   -Plan for Examination under anesthesia this admission (likely Friday 8/7, pending OR availability)   -Please obtain GI consult for colonoscopy - patient denies any previous colonoscopy   -Please start sitz baths TID   -Recommend zinc or calmoseptine as a barrier cream.  In addition, patient should have dry guaze applied over the fistula that needs changed q4 hours or more frequently as needed   -Will continue to closely follow

## 2020-08-05 NOTE — PROGRESS NOTE ADULT - PROBLEM SELECTOR PLAN 3
- b/l pulmonary nodules incidentally noted on CT A/P, patient denies any current respiratory symptoms, unclear on the etiology of the nodules, will obtain CT Chest with IV contrast for further evaluation, would likely need tissue biopsy for further eval but awaiting CT Chest to assess for best biopsy site

## 2020-08-05 NOTE — PROGRESS NOTE ADULT - SUBJECTIVE AND OBJECTIVE BOX
Internal Medicine Progress Note    =======================================================  CONTACT KNEYATTA Wolf M.D., PGY-1  Pager:  [phone] ([facility])  =======================================================    Patient is a 75y old  Male who presents with a chief complaint of Perianal discharge and low back pain x4 weeks (04 Aug 2020 21:24)      SUBJECTIVE / OVERNIGHT EVENTS:    MEDICATIONS  (STANDING):  heparin   Injectable 5000 Unit(s) SubCutaneous every 12 hours  loratadine 10 milliGRAM(s) Oral daily  piperacillin/tazobactam IVPB.. 3.375 Gram(s) IV Intermittent every 8 hours  sodium chloride 0.9% lock flush 3 milliLiter(s) IV Push every 8 hours  vancomycin  IVPB      vancomycin  IVPB 1000 milliGRAM(s) IV Intermittent every 12 hours    MEDICATIONS  (PRN):  acetaminophen   Tablet .. 650 milliGRAM(s) Oral every 6 hours PRN Temp greater or equal to 38C (100.4F), Mild Pain (1 - 3), Moderate Pain (4 - 6)    Vital Signs Last 24 Hrs  T(C): 36.5 (05 Aug 2020 05:12), Max: 37 (04 Aug 2020 11:06)  T(F): 97.7 (05 Aug 2020 05:12), Max: 98.6 (04 Aug 2020 11:06)  HR: 70 (05 Aug 2020 05:12) (70 - 100)  BP: 115/61 (05 Aug 2020 05:12) (115/61 - 167/102)  BP(mean): --  RR: 18 (05 Aug 2020 05:12) (16 - 18)  SpO2: 99% (05 Aug 2020 05:12) (97% - 100%)    CAPILLARY BLOOD GLUCOSE        I&O's Summary      PHYSICAL EXAM  GENERAL: NAD  HEAD:  Atraumatic  EYES: EOMI, PERRLA, conjunctiva and sclera clear  NECK: Supple, No JVD  CHEST/LUNG: Clear to auscultation bilaterally; No wheeze  HEART: Regular rate and rhythm; No murmurs, rubs, or gallops  ABDOMEN: Soft, Nontender, Nondistended; Bowel sounds present  EXTREMITIES:  2+ Peripheral Pulses, No clubbing, cyanosis, or edema  NEURO/PSYCH: AAOx3, nonfocal  SKIN: No rashes or lesions      LABS:                        16.8   12.02 )-----------( 284      ( 04 Aug 2020 12:50 )             49.8     Hemoglobin: 16.8 g/dL (08-04 @ 12:50)    CBC Full  -  ( 04 Aug 2020 12:50 )  WBC Count : 12.02 K/uL  RBC Count : 5.22 M/uL  Hemoglobin : 16.8 g/dL  Hematocrit : 49.8 %  Platelet Count - Automated : 284 K/uL  Mean Cell Volume : 95.4 fL  Mean Cell Hemoglobin : 32.2 pg  Mean Cell Hemoglobin Concentration : 33.7 %  Auto Neutrophil # : 9.72 K/uL  Auto Lymphocyte # : 1.17 K/uL  Auto Monocyte # : 0.95 K/uL  Auto Eosinophil # : 0.06 K/uL  Auto Basophil # : 0.07 K/uL  Auto Neutrophil % : 80.9 %  Auto Lymphocyte % : 9.7 %  Auto Monocyte % : 7.9 %  Auto Eosinophil % : 0.5 %  Auto Basophil % : 0.6 %    08-04    141  |  101  |  11  ----------------------------<  104<H>  5.3   |  28  |  0.93    Ca    9.8      04 Aug 2020 12:50    TPro  7.7  /  Alb  3.6  /  TBili  0.7  /  DBili  x   /  AST  15  /  ALT  12  /  AlkPhos  107  08-04    Creatinine Trend: 0.93<--  LIVER FUNCTIONS - ( 04 Aug 2020 12:50 )  Alb: 3.6 g/dL / Pro: 7.7 g/dL / ALK PHOS: 107 u/L / ALT: 12 u/L / AST: 15 u/L / GGT: x           PT/INR - ( 04 Aug 2020 12:50 )   PT: 14.5 SEC;   INR: 1.28          PTT - ( 04 Aug 2020 12:50 )  PTT:35.8 SEC    hs Troponin:              CSF:                      EKG:   MICROBIOLOGY:    IMAGING:      Labs, imaging, EKG personally reviewed Internal Medicine Progress Note    =======================================================  CONTACT KENYATTA Wolf M.D., PGY-1  Pager:  [phone] ([facility])  =======================================================    Patient is a 75y old  Male who presents with a chief complaint of Perianal discharge and low back pain x4 weeks (04 Aug 2020 21:24)      SUBJECTIVE / OVERNIGHT EVENTS:    This am, pt continued to complain of brown discharge from rectum. States that this began 4-5 weeks ago intermittently, but became daily 2 weeks ago. Pt also notes associated worsening low back pain that began at the same time as the discharge. Pt says the back pain is worse with movements and feels like a strain. Tried Tylenol for the back pain with no relief. Pt states that his last solid BM was approximately 2 months ago. He endorses a 10 pound weight loss in the last 3 months and decreased appetite.    MEDICATIONS  (STANDING):  heparin   Injectable 5000 Unit(s) SubCutaneous every 12 hours  loratadine 10 milliGRAM(s) Oral daily  piperacillin/tazobactam IVPB.. 3.375 Gram(s) IV Intermittent every 8 hours  sodium chloride 0.9% lock flush 3 milliLiter(s) IV Push every 8 hours  vancomycin  IVPB      vancomycin  IVPB 1000 milliGRAM(s) IV Intermittent every 12 hours    MEDICATIONS  (PRN):  acetaminophen   Tablet .. 650 milliGRAM(s) Oral every 6 hours PRN Temp greater or equal to 38C (100.4F), Mild Pain (1 - 3), Moderate Pain (4 - 6)    Vital Signs Last 24 Hrs  T(C): 36.5 (05 Aug 2020 05:12), Max: 37 (04 Aug 2020 11:06)  T(F): 97.7 (05 Aug 2020 05:12), Max: 98.6 (04 Aug 2020 11:06)  HR: 70 (05 Aug 2020 05:12) (70 - 100)  BP: 115/61 (05 Aug 2020 05:12) (115/61 - 167/102)  BP(mean): --  RR: 18 (05 Aug 2020 05:12) (16 - 18)  SpO2: 99% (05 Aug 2020 05:12) (97% - 100%)    CAPILLARY BLOOD GLUCOSE        I&O's Summary      PHYSICAL EXAM  GENERAL: NAD  HEAD:  Atraumatic  EYES: EOMI, PERRLA, conjunctiva and sclera clear  NECK: Supple, No JVD  CHEST/LUNG: Clear to auscultation bilaterally; No wheeze  HEART: Regular rate and rhythm; No murmurs, rubs, or gallops  ABDOMEN: Bowel sounds present. Soft, Nontender, Nondistended.   : brown discharge draining from fistula  EXTREMITIES:  2+ Peripheral Pulses, No clubbing, cyanosis, or edema  NEURO/PSYCH: AAOx3, nonfocal  SKIN: No rashes or lesions      LABS:                        16.8   12.02 )-----------( 284      ( 04 Aug 2020 12:50 )             49.8     Hemoglobin: 16.8 g/dL (08-04 @ 12:50)    CBC Full  -  ( 04 Aug 2020 12:50 )  WBC Count : 12.02 K/uL  RBC Count : 5.22 M/uL  Hemoglobin : 16.8 g/dL  Hematocrit : 49.8 %  Platelet Count - Automated : 284 K/uL  Mean Cell Volume : 95.4 fL  Mean Cell Hemoglobin : 32.2 pg  Mean Cell Hemoglobin Concentration : 33.7 %  Auto Neutrophil # : 9.72 K/uL  Auto Lymphocyte # : 1.17 K/uL  Auto Monocyte # : 0.95 K/uL  Auto Eosinophil # : 0.06 K/uL  Auto Basophil # : 0.07 K/uL  Auto Neutrophil % : 80.9 %  Auto Lymphocyte % : 9.7 %  Auto Monocyte % : 7.9 %  Auto Eosinophil % : 0.5 %  Auto Basophil % : 0.6 %    08-04    141  |  101  |  11  ----------------------------<  104<H>  5.3   |  28  |  0.93    Ca    9.8      04 Aug 2020 12:50    TPro  7.7  /  Alb  3.6  /  TBili  0.7  /  DBili  x   /  AST  15  /  ALT  12  /  AlkPhos  107  08-04    Creatinine Trend: 0.93<--  LIVER FUNCTIONS - ( 04 Aug 2020 12:50 )  Alb: 3.6 g/dL / Pro: 7.7 g/dL / ALK PHOS: 107 u/L / ALT: 12 u/L / AST: 15 u/L / GGT: x           PT/INR - ( 04 Aug 2020 12:50 )   PT: 14.5 SEC;   INR: 1.28          PTT - ( 04 Aug 2020 12:50 )  PTT:35.8 SEC    hs Troponin:              CSF:                      EKG:   MICROBIOLOGY:    IMAGING:      Labs, imaging, EKG personally reviewed

## 2020-08-05 NOTE — CHART NOTE - NSCHARTNOTEFT_GEN_A_CORE
Met this nice gentleman and discussed the current findings. Pt with fistula-in-ano but also with concern for metastatic cancer of unknown primary and possible lumbar discitis. Of note the pt has never had a colonoscopy. On exam, patient with significant soilage/stool present and at least 1 fistulous opening on the right side. Explained to the pt the plan from a colorectal standpoint at this point is as follows:  - plan for an EUA, possible biopsy, other indicated procedures (tentatively for Friday pending OR availability)  - GI consult for colonoscopy   - start sitz baths 2-3 times a day  - recommend zinc or calmoseptine for a barrier cream  - place dry gauze which should be changed at least q4hr  - will continue to follow the pt. Feel free to call us with any questions or concerns!    Aziza Valdez MD  Attending Physician  Colorectal Surgery

## 2020-08-06 ENCOUNTER — TRANSCRIPTION ENCOUNTER (OUTPATIENT)
Age: 75
End: 2020-08-06

## 2020-08-06 LAB
ANION GAP SERPL CALC-SCNC: 14 MMO/L — SIGNIFICANT CHANGE UP (ref 7–14)
BUN SERPL-MCNC: 8 MG/DL — SIGNIFICANT CHANGE UP (ref 7–23)
CALCIUM SERPL-MCNC: 9.3 MG/DL — SIGNIFICANT CHANGE UP (ref 8.4–10.5)
CEA SERPL-MCNC: 9.3 NG/ML — HIGH (ref 1–3.8)
CHLORIDE SERPL-SCNC: 98 MMOL/L — SIGNIFICANT CHANGE UP (ref 98–107)
CO2 SERPL-SCNC: 25 MMOL/L — SIGNIFICANT CHANGE UP (ref 22–31)
CREAT SERPL-MCNC: 0.77 MG/DL — SIGNIFICANT CHANGE UP (ref 0.5–1.3)
GLUCOSE SERPL-MCNC: 85 MG/DL — SIGNIFICANT CHANGE UP (ref 70–99)
MAGNESIUM SERPL-MCNC: 2 MG/DL — SIGNIFICANT CHANGE UP (ref 1.6–2.6)
PHOSPHATE SERPL-MCNC: 4.1 MG/DL — SIGNIFICANT CHANGE UP (ref 2.5–4.5)
POTASSIUM SERPL-MCNC: 3.5 MMOL/L — SIGNIFICANT CHANGE UP (ref 3.5–5.3)
POTASSIUM SERPL-SCNC: 3.5 MMOL/L — SIGNIFICANT CHANGE UP (ref 3.5–5.3)
SARS-COV-2 IGG SERPL QL IA: NEGATIVE — SIGNIFICANT CHANGE UP
SARS-COV-2 IGM SERPL IA-ACNC: <3.8 AU/ML — SIGNIFICANT CHANGE UP
SODIUM SERPL-SCNC: 137 MMOL/L — SIGNIFICANT CHANGE UP (ref 135–145)

## 2020-08-06 PROCEDURE — 99222 1ST HOSP IP/OBS MODERATE 55: CPT | Mod: GC

## 2020-08-06 PROCEDURE — 99232 SBSQ HOSP IP/OBS MODERATE 35: CPT

## 2020-08-06 PROCEDURE — 71045 X-RAY EXAM CHEST 1 VIEW: CPT | Mod: 26

## 2020-08-06 PROCEDURE — 99231 SBSQ HOSP IP/OBS SF/LOW 25: CPT | Mod: 57

## 2020-08-06 PROCEDURE — 99233 SBSQ HOSP IP/OBS HIGH 50: CPT | Mod: GC

## 2020-08-06 PROCEDURE — 99223 1ST HOSP IP/OBS HIGH 75: CPT | Mod: GC

## 2020-08-06 PROCEDURE — 93010 ELECTROCARDIOGRAM REPORT: CPT

## 2020-08-06 RX ADMIN — Medication 650 MILLIGRAM(S): at 08:20

## 2020-08-06 RX ADMIN — ZINC OXIDE 1 APPLICATION(S): 200 OINTMENT TOPICAL at 21:34

## 2020-08-06 RX ADMIN — ZINC OXIDE 1 APPLICATION(S): 200 OINTMENT TOPICAL at 15:13

## 2020-08-06 RX ADMIN — ZINC OXIDE 1 APPLICATION(S): 200 OINTMENT TOPICAL at 05:55

## 2020-08-06 RX ADMIN — Medication 650 MILLIGRAM(S): at 00:39

## 2020-08-06 RX ADMIN — SODIUM CHLORIDE 3 MILLILITER(S): 9 INJECTION INTRAMUSCULAR; INTRAVENOUS; SUBCUTANEOUS at 05:48

## 2020-08-06 RX ADMIN — Medication 650 MILLIGRAM(S): at 15:17

## 2020-08-06 RX ADMIN — Medication 650 MILLIGRAM(S): at 15:47

## 2020-08-06 RX ADMIN — Medication 650 MILLIGRAM(S): at 07:26

## 2020-08-06 RX ADMIN — Medication 1 ENEMA: at 21:34

## 2020-08-06 RX ADMIN — SODIUM CHLORIDE 3 MILLILITER(S): 9 INJECTION INTRAMUSCULAR; INTRAVENOUS; SUBCUTANEOUS at 21:32

## 2020-08-06 RX ADMIN — SODIUM CHLORIDE 3 MILLILITER(S): 9 INJECTION INTRAMUSCULAR; INTRAVENOUS; SUBCUTANEOUS at 15:14

## 2020-08-06 RX ADMIN — Medication 650 MILLIGRAM(S): at 01:30

## 2020-08-06 RX ADMIN — LORATADINE 10 MILLIGRAM(S): 10 TABLET ORAL at 15:13

## 2020-08-06 NOTE — CONSULT NOTE ADULT - ASSESSMENT
Impression:   #Perianal fistula: pt found to have complex perianal fistula on CT a/p after 4 weeks of rectal discharge and pain. Found to have leukocytosis, Hb 15-16s w/ no prior Hx of colonoscopy. Plan for surgery to do EUA w/ +/- seton placement for perianal fistula. GI consulted to r/o malignancy. Ddx perianal fistula may be colorectal malignancy vs less likely IBD/Crohn's (other etiologies could include radiation proctitis vs anorectal foreign body vs infectious etiologies such as anorectal TB vs LGV however these are less likely without risk factors).     Recommendations:   - Plan for EUA / seton (tentatively planned for Friday) per colorectal surgery  - If pt to remain inpatient, GI can do inpatient colonoscopy (please let us know) vs schedule for outpatient colonoscopy if pt to be discharged after seton placement   - GI to follow  - Rest of plan per primary team      Thank you for involving us in the care of this patient, please reach out if any further questions.     Yuval Cotton MD  Gastroenterology Fellow, PGY4    Available on [employer] Teams  [phone] ([facility])  [phone] ([facility])  Please contact on call fellow weekdays after 5pm-7am and weekends: [phone] Impression:   #Perianal fistula: pt found to have complex perianal fistula on CT a/p after 4 weeks of rectal discharge and pain. Found to have leukocytosis, Hb 15-16s w/ no prior Hx of colonoscopy. Plan for surgery to do EUA w/ +/- seton placement for perianal fistula. GI consulted to r/o malignancy. Ddx perianal fistula may be colorectal malignancy vs less likely IBD/Crohn's (other etiologies could include radiation proctitis vs anorectal foreign body vs infectious etiologies such as anorectal TB vs LGV however these are less likely without risk factors).     Recommendations:   - Plan for EUA / seton (tentatively planned for Friday) per colorectal surgery  - If pt to remain inpatient, GI can do inpatient colonoscopy early next week vs schedule for outpatient colonoscopy if pt to be discharged after seton placement   - GI to follow  - Rest of plan per primary team      Thank you for involving us in the care of this patient, please reach out if any further questions.     Yuval Cotton MD  Gastroenterology Fellow, PGY4    Available on [employer] Teams  [phone] ([facility])  [phone] ([facility])  Please contact on call fellow weekdays after 5pm-7am and weekends: [phone]

## 2020-08-06 NOTE — PROGRESS NOTE ADULT - PROBLEM SELECTOR PLAN 1
- Symptoms started around the same time patient's perianal discharge started, unclear if related to perianal fistula noted on CT or separate  - Per ID, abx not needed at this time, likely mets. MRI Pending  - No current neurological deficits at present, will monitor on neuro checks q4h for now Perianal fistula seen on CT A/P  - Pending EUA and possible drainage in OR w/ Sx, likely 8/7, NPO p MN and hold A/C; Enema prior to OR  - CEA elevated; GI consulted for colonoscopy for malignancy workup, maybe inpatient or outpatient per clinical course

## 2020-08-06 NOTE — CONSULT NOTE ADULT - ATTENDING COMMENTS
Multiple peripheral-based lung nodules in the RLL, BRAD, and LLL. Worrisome for malignancy. Differential also includes septic emboli. Multiple peripheral-based lung nodules in the RLL, BRAD, and LLL. Worrisome for malignancy. Differential also includes septic emboli. Follow-up blood cultures. Check 2D-echo. Least invasive diagnostic approach is CT-guided lung biopsy. Please call back if any further questions.

## 2020-08-06 NOTE — PROGRESS NOTE ADULT - SUBJECTIVE AND OBJECTIVE BOX
CC: F/U for ? OM    Saw/spoke to patient. No fevers, no chills. No new complaints. Unchanged.    Allergies  dust (Rhinitis; Headache)  No Known Drug Allergies    ANTIMICROBIALS:  Off    PE:    Vital Signs Last 24 Hrs  T(C): 36.4 (06 Aug 2020 05:52), Max: 36.6 (05 Aug 2020 21:33)  T(F): 97.5 (06 Aug 2020 05:52), Max: 97.8 (05 Aug 2020 21:33)  HR: 67 (06 Aug 2020 05:52) (67 - 86)  BP: 132/82 (06 Aug 2020 05:52) (112/74 - 132/82)  RR: 18 (06 Aug 2020 05:52) (18 - 18)  SpO2: 98% (06 Aug 2020 05:52) (98% - 99%)    Gen: AOx3, NAD, non-toxic  CV: S1+S2 normal, nontachycardic  Resp: Clear bilat, no resp distress, no crackles/wheezes  Abd: Soft, nontender, +BS  Ext: No LE edema, no wounds    LABS:                        15.6   8.15  )-----------( 269      ( 05 Aug 2020 06:55 )             48.5     08-06    137  |  98  |  8   ----------------------------<  85  3.5   |  25  |  0.77    Ca    9.3      06 Aug 2020 07:15  Phos  4.1     08-06  Mg     2.0     08-06    Urinalysis Basic - ( 05 Aug 2020 13:26 )    Color: YELLOW / Appearance: CLEAR / S.025 / pH: 6.0  Gluc: NEGATIVE / Ketone: TRACE  / Bili: NEGATIVE / Urobili: SMALL   Blood: NEGATIVE / Protein: NEGATIVE / Nitrite: NEGATIVE   Leuk Esterase: NEGATIVE / RBC: x / WBC x   Sq Epi: x / Non Sq Epi: x / Bacteria: x    MICROBIOLOGY:     COVID neg    RADIOLOGY:     CT:    IMPRESSION:    Multiple nodules involving the right lower lobe and both left lower and upper lobes, concerning for metastatic disease. If there are prior, outside CT exams of the chest they should be submitted for comparison.

## 2020-08-06 NOTE — PROGRESS NOTE ADULT - SUBJECTIVE AND OBJECTIVE BOX
PROGRESS NOTE:   Authoted by Dr. Erica Holcomb MD  Pager [phone] [facility], [id number] [place]     Patient is a 75y old  Male who presents with a chief complaint of Perianal discharge and low back pain x4 weeks (05 Aug 2020 20:21)      SUBJECTIVE / OVERNIGHT EVENTS:     REVIEW OF SYSTEMS:    CONSTITUTIONAL: No weakness, fevers or chills  EYES/ENT: No visual changes;  No vertigo or throat pain   NECK: No pain or stiffness  RESPIRATORY: No cough, wheezing, hemoptysis; No shortness of breath  CARDIOVASCULAR: No chest pain or palpitations  GASTROINTESTINAL: No abdominal or epigastric pain. No nausea, vomiting, or hematemesis; No diarrhea or constipation. No melena or hematochezia.  GENITOURINARY: No dysuria, frequency or hematuria  NEUROLOGICAL: No numbness or weakness  SKIN: No itching, rashes    MEDICATIONS  (STANDING):  heparin   Injectable 5000 Unit(s) SubCutaneous every 12 hours  loratadine 10 milliGRAM(s) Oral daily  sodium chloride 0.9% lock flush 3 milliLiter(s) IV Push every 8 hours  zinc oxide 20% Ointment 1 Application(s) Topical every 8 hours    MEDICATIONS  (PRN):  acetaminophen   Tablet .. 650 milliGRAM(s) Oral every 6 hours PRN Temp greater or equal to 38C (100.4F), Mild Pain (1 - 3), Moderate Pain (4 - 6)      CAPILLARY BLOOD GLUCOSE        I&O's Summary    05 Aug 2020 07:01  -  06 Aug 2020 07:00  --------------------------------------------------------  IN: 0 mL / OUT: 200 mL / NET: -200 mL        PHYSICAL EXAM:  Vital Signs Last 24 Hrs  T(C): 36.4 (06 Aug 2020 05:52), Max: 36.6 (05 Aug 2020 21:33)  T(F): 97.5 (06 Aug 2020 05:52), Max: 97.8 (05 Aug 2020 21:33)  HR: 67 (06 Aug 2020 05:52) (67 - 86)  BP: 132/82 (06 Aug 2020 05:52) (112/74 - 132/82)  BP(mean): --  RR: 18 (06 Aug 2020 05:52) (18 - 18)  SpO2: 98% (06 Aug 2020 05:52) (98% - 99%)    CONSTITUTIONAL: NAD, well-developed  RESPIRATORY: Normal respiratory effort; lungs are clear to auscultation bilaterally  CARDIOVASCULAR: Regular rate and rhythm, normal S1 and S2, no murmur/rub/gallop; No lower extremity edema; Peripheral pulses are 2+ bilaterally  ABDOMEN: Nontender to palpation, normoactive bowel sounds, no rebound/guarding; No hepatosplenomegaly  MUSCLOSKELETAL: no clubbing or cyanosis of digits; no joint swelling or tenderness to palpation  PSYCH: A+O to person, place, and time; affect appropriate  NEURO: Non-focal, no tremors  SKIN: No rashes    LABS:                        15.6   8.15  )-----------( 269      ( 05 Aug 2020 06:55 )             48.5     08-05    139  |  97<L>  |  9   ----------------------------<  95  3.6   |  26  |  0.81    Ca    9.6      05 Aug 2020 06:55  Phos  4.1     08-05  Mg     2.1     08-05    TPro  7.7  /  Alb  3.6  /  TBili  0.7  /  DBili  x   /  AST  15  /  ALT  12  /  AlkPhos  107  08-04    PT/INR - ( 04 Aug 2020 12:50 )   PT: 14.5 SEC;   INR: 1.28          PTT - ( 04 Aug 2020 12:50 )  PTT:35.8 SEC      Urinalysis Basic - ( 05 Aug 2020 13:26 )    Color: YELLOW / Appearance: CLEAR / S.025 / pH: 6.0  Gluc: NEGATIVE / Ketone: TRACE  / Bili: NEGATIVE / Urobili: SMALL   Blood: NEGATIVE / Protein: NEGATIVE / Nitrite: NEGATIVE   Leuk Esterase: NEGATIVE / RBC: x / WBC x   Sq Epi: x / Non Sq Epi: x / Bacteria: x          RADIOLOGY & ADDITIONAL TESTS:  No new imaging or tests    COORDINATION OF CARE:  Care Discussed with Consultants/Other Providers [Y/N]:  Prior or Outpatient Records Reviewed [Y/N]: PROGRESS NOTE:   Authoted by Dr. Erica Holcomb MD  Pager [phone] [facility], [address] [facility]     Patient is a 75y old  Male who presents with a chief complaint of Perianal discharge and low back pain x4 weeks (05 Aug 2020 20:21)      SUBJECTIVE / OVERNIGHT EVENTS: No acute events overnight. Patient reports anal drarinage and back pain are about the same as yesterday. No other symptoms.     REVIEW OF SYSTEMS:    CONSTITUTIONAL: No fevers or chills  EYES/ENT: No visual changes  NECK: No pain or stiffness  RESPIRATORY: No cough or shortness of breath  CARDIOVASCULAR: No chest pain  GASTROINTESTINAL: No abdominal pain. No nausea, vomiting, diarrhea or constipation. (+) anal drainage  GENITOURINARY: No dysuria  NEUROLOGICAL: No numbness or weakness  SKIN: No itching, rashes    MEDICATIONS  (STANDING):  heparin   Injectable 5000 Unit(s) SubCutaneous every 12 hours  loratadine 10 milliGRAM(s) Oral daily  sodium chloride 0.9% lock flush 3 milliLiter(s) IV Push every 8 hours  zinc oxide 20% Ointment 1 Application(s) Topical every 8 hours    MEDICATIONS  (PRN):  acetaminophen   Tablet .. 650 milliGRAM(s) Oral every 6 hours PRN Temp greater or equal to 38C (100.4F), Mild Pain (1 - 3), Moderate Pain (4 - 6)      CAPILLARY BLOOD GLUCOSE        I&O's Summary    05 Aug 2020 07:01  -  06 Aug 2020 07:00  --------------------------------------------------------  IN: 0 mL / OUT: 200 mL / NET: -200 mL        PHYSICAL EXAM:  Vital Signs Last 24 Hrs  T(C): 36.4 (06 Aug 2020 05:52), Max: 36.6 (05 Aug 2020 21:33)  T(F): 97.5 (06 Aug 2020 05:52), Max: 97.8 (05 Aug 2020 21:33)  HR: 67 (06 Aug 2020 05:52) (67 - 86)  BP: 132/82 (06 Aug 2020 05:52) (112/74 - 132/82)  BP(mean): --  RR: 18 (06 Aug 2020 05:52) (18 - 18)  SpO2: 98% (06 Aug 2020 05:52) (98% - 99%)    CONSTITUTIONAL: NAD, well-developed  RESPIRATORY: Normal respiratory effort; lungs are clear to auscultation bilaterally  CARDIOVASCULAR: Regular rate and rhythm, normal S1 and S2. No lower extremity edema; anal region dressed  ABDOMEN: Nontender to palpation, normoactive bowel sounds, no rebound/guarding  MUSCULOSKELETAL no clubbing or cyanosis of digits; no joint swelling or tenderness to palpation  PSYCH: A+O to person, place, and time; affect appropriate  NEURO: Non-focal, no tremors  SKIN: No rashes    LABS:                        15.6   8.15  )-----------( 269      ( 05 Aug 2020 06:55 )             48.5     08-05    139  |  97<L>  |  9   ----------------------------<  95  3.6   |  26  |  0.81    Ca    9.6      05 Aug 2020 06:55  Phos  4.1     08-05  Mg     2.1     08-05    TPro  7.7  /  Alb  3.6  /  TBili  0.7  /  DBili  x   /  AST  15  /  ALT  12  /  AlkPhos  107  08-04    PT/INR - ( 04 Aug 2020 12:50 )   PT: 14.5 SEC;   INR: 1.28          PTT - ( 04 Aug 2020 12:50 )  PTT:35.8 SEC      Urinalysis Basic - ( 05 Aug 2020 13:26 )    Color: YELLOW / Appearance: CLEAR / S.025 / pH: 6.0  Gluc: NEGATIVE / Ketone: TRACE  / Bili: NEGATIVE / Urobili: SMALL   Blood: NEGATIVE / Protein: NEGATIVE / Nitrite: NEGATIVE   Leuk Esterase: NEGATIVE / RBC: x / WBC x   Sq Epi: x / Non Sq Epi: x / Bacteria: x          RADIOLOGY & ADDITIONAL TESTS:  No new imaging or tests    COORDINATION OF CARE:  Care Discussed with Consultants/Other Providers [Y/N]: Sx, IR, pulm, ID, GI  Prior or Outpatient Records Reviewed [Y/N]: N

## 2020-08-06 NOTE — PROGRESS NOTE ADULT - PROBLEM SELECTOR PLAN 2
- Seen by general surgery, possible seton placement this admission but no definite plans laid out currently  - Currently the fistula with draining feculent material, no pain, no redness associated  - Will c/w empiric abx, f/u further surgery recommendations  -Surg recs pending L4/5 discitis w/ possible abscesses on MR  - Observe off abx and obtain Bcx x 2 per ID  - No current neurological deficits at present, will monitor on neuro checks q4h for now  - IR approved for spinal biopsy, will proceed after Bcx clearance, likely Monday

## 2020-08-06 NOTE — CONSULT NOTE ADULT - ASSESSMENT
Assessment: 75y Male with pulmonary nodules on CT; request for lung nodule biopsy. Discitis/OM noted on lumbar MR; request for lumbar biopsy.    Plan:  -Please place order for IR Procedure lung nodule biopsy. Lumbar biopsy is pending blood culture results, Approving attending Dr. Echols.  - Plan for 8/10. NPO past midnight the day of the procedure.   -hold therapeutic and prophylactic anticoagulants  -maintain active type and screen x 2

## 2020-08-06 NOTE — CHART NOTE - NSCHARTNOTEFT_GEN_A_CORE
Discussed with patient and wife at the bedside. Plan for OR tomorrow for EUA, possible biopsy, possible seton. Currently scheduled for 4pm. Also discussed need for colonoscopy and appreciate GI's assistance with this.  - NPO after midnight  - OR tomorrow    Aziza Valdez MD  Attending Physician  Colorectal Surgery

## 2020-08-06 NOTE — CONSULT NOTE ADULT - ATTENDING COMMENTS
GI consulted for possible colonoscopy in patient with new perianal fistulas.   No prior colonoscopy.   Undergoing EUA with seton placement tomorrow.   Pending clinical course and patient's preference, can determine timing of colonoscopy either inpatient vs outpatient.   GI will continue to follow.

## 2020-08-06 NOTE — CHART NOTE - NSCHARTNOTEFT_GEN_A_CORE
PRE OPERATIVE NOTE    Pre-op Diagnosis: Anal fistula  Procedure: Exam under anesthesia, possible biopsy  Surgeon: Dr. Valdez                           15.6   8.15  )-----------( 269      ( 05 Aug 2020 06:55 )             48.5     08-06    137  |  98  |  8   ----------------------------<  85  3.5   |  25  |  0.77    Ca    9.3      06 Aug 2020 07:15  Phos  4.1     08-06  Mg     2.0     08-06          Urinalysis Basic - ( 05 Aug 2020 13:26 )    Color: YELLOW / Appearance: CLEAR / S.025 / pH: 6.0  Gluc: NEGATIVE / Ketone: TRACE  / Bili: NEGATIVE / Urobili: SMALL   Blood: NEGATIVE / Protein: NEGATIVE / Nitrite: NEGATIVE   Leuk Esterase: NEGATIVE / RBC: x / WBC x   Sq Epi: x / Non Sq Epi: x / Bacteria: x      CAPILLARY BLOOD GLUCOSE      Type & Screen: Completed  CXR:  EKG:  Echocardiogram: Not completed     A/P: 75y Male planned for above procedure  NPO past midnight, except medications  IVF  Pain/nausea control  AM labs PRE OPERATIVE NOTE    Pre-op Diagnosis: Anal fistula  Procedure: Exam under anesthesia, possible biopsy  Surgeon: Dr. Valdez                           15.6   8.15  )-----------( 269      ( 05 Aug 2020 06:55 )             48.5     08-06    137  |  98  |  8   ----------------------------<  85  3.5   |  25  |  0.77    Ca    9.3      06 Aug 2020 07:15  Phos  4.1     08-06  Mg     2.0     08-06          Urinalysis Basic - ( 05 Aug 2020 13:26 )    Color: YELLOW / Appearance: CLEAR / S.025 / pH: 6.0  Gluc: NEGATIVE / Ketone: TRACE  / Bili: NEGATIVE / Urobili: SMALL   Blood: NEGATIVE / Protein: NEGATIVE / Nitrite: NEGATIVE   Leuk Esterase: NEGATIVE / RBC: x / WBC x   Sq Epi: x / Non Sq Epi: x / Bacteria: x      CAPILLARY BLOOD GLUCOSE      Type & Screen: Completed  CXR:  EKG: Sinus with occasional PVCs, unchanged from previous on   Echocardiogram: Not completed     A/P: 75y Male planned for above procedure  NPO past midnight, except medications  IVF  Pain/nausea control  AM labs

## 2020-08-06 NOTE — PROGRESS NOTE ADULT - PROBLEM SELECTOR PLAN 5
- Incidentally found to have PVCs on routine EKG here, patient denies any cardiac complaints, likely would benefit from outpatient follow up with a PCP/cardiology for further evaluation DVT ppx - HSQ, hold for OR  Diet - CC + DASH diet, NPO p MN  Activity - Ambulate as tolerated  Dispo: Pending workup

## 2020-08-06 NOTE — CONSULT NOTE ADULT - SUBJECTIVE AND OBJECTIVE BOX
Reason for Consult: 75y Male with pulmonary nodules on CT; request for lung nodule biopsy. Discitis/OM noted on lumbar MR; request for lumbar biopsy.    History of Present Illness:     This is a 75M with history of Basal Cell Carcinoma of nose s/p surgical resection with nasal reconstruction who presents to the hospital with complaints of perianal rectal discharge and lower back pain x4 weeks. Said that he initially noted feculent discharge from around his anal area about 4 weeks ago, was intermittent and therefore had not presented to a doctor for evaluation. Said that he also had some lower back pain that started around the same time. Over the past week his symptoms worsened and he therefore came to the hospital for evaluation. Currently said that he notes feculent discharge from his rectal area without any significant rectal pain, denies any associated blood or purulence with the feculent discharge. Still has lower back pain that he said is mild in nature. Denies any associated fevers/chills, diaphoresis, or other infectious complaints.     On arrival to the ED, his vitals were T 98.6, P 100, /102, RR 18, O2 sat 100% RA. His lab work showed leukocytosis with neutrophilia and mild coagulopathy. His CT A/P showed a complex perianal fistula with fat stranding, erosive changes at L4-L5 concerning for discitis/osteomyelitis, and b/l pulmonary nodules. He was kirill rodriguez/zosyn. He was evaluated by general surgery and recommended that the patient be admitted to medicine for malignancy work up. Patient was then admitted to medicine. (04 Aug 2020 21:24)      Pertinent PMH/PSH:   H/O: Obesity  Basal Cell Cancer; Right side distal nose  Seasonal Allergies  H/O skin graft  Basal Cell Carcinoma of Anterior Chest; s/p MOHS surgery 8/10 proximal to suprasternal notch      Allergies:   dust (Rhinitis; Headache)  No Known Drug Allergies      Medications:   sodium chloride 0.9% lock flush  acetaminophen   Tablet ..  heparin   Injectable  loratadine  zinc oxide 20% Ointment      Vital Signs:  T(C): 36.7 (08-06-20 @ 15:16), Max: 36.7 (08-06-20 @ 15:16)  HR: 76 (08-06-20 @ 15:16) (67 - 86)  BP: 124/73 (08-06-20 @ 15:16) (112/74 - 132/82)  RR: 18 (08-06-20 @ 15:16) (18 - 18)  SpO2: 97% (08-06-20 @ 15:16) (97% - 99%)    Relevant Lab Results:          15.6  8.15)-----(269     (08-05-20 @ 06:55)         48.5     137 | 98 | 8  ----------------------< 85     (08-06-20 @ 07:15)  3.5 | 25 | 0.77       PT: 14.5<H> 08-04-20 @ 12:50  aPTT: 35.8 08-04-20 @ 12:50   INR: 1.28<H> 08-04-20 @ 12:50      Imaging:   CT Chest 8/5: Multiple nodules involving the right lower lobe and both left lower and upper lobes, concerning for metastatic disease.  MR Lumbar Spine 8/5: Discitis/myelitis is seen at the L5-S1 level with some epidural and left paraspinal extension suspected. There is evidence of a collection seen in the ventral aspect of the spinal canal at the S1 level as described above.

## 2020-08-06 NOTE — CONSULT NOTE ADULT - PROBLEM SELECTOR RECOMMENDATION 9
CT with RLL nodule 0.5 cm x 0.5 cm; RLL nodule 1.6 cm x 2 cm; LLL nodule 3 cm x 3.2 cm; BRAD nodule 0.5 cm x 0.6 cm. Concern for metastatic disease of unknown primary, GI?.   - Agree with IR for biopsy of lung nodules  - Consider TTE to rule out septic emboli but less likely    Will sign off, please call back if any questions  Ochoa Layton MD  Pulmonary & Critical Care Fellow  [phone]

## 2020-08-06 NOTE — PROGRESS NOTE ADULT - PROBLEM SELECTOR PLAN 6
- Continue Claritin. 1.  Name of PCP: no current PCP (previous PCP retired 1 year ago)  2.  PCP Contacted on Admission: [ ] Y    [X] N    3.  PCP contacted at Discharge: [ ] Y    [ ] N    [ ] N/A  4.  Post-Discharge Appointment Date and Location:  5.  Summary of Handoff given to PCP:

## 2020-08-06 NOTE — PROGRESS NOTE ADULT - ASSESSMENT
This is a 75M with history of Basal Cell Carcinoma of the nose s/p surgical excision who presents to the hospital with perirectal feculent drainage and lower back pain found to have perianal fistula and suspicion of lumbar vertebral OM. Also with pulmonary nodules of unclear etiology. 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 Also with pulmonary nodules of unclear etiology. Pending EUA w/ sx, lung/spine biospy w/ IR, and colonoscopy w/ GI.

## 2020-08-06 NOTE — CONSULT NOTE ADULT - SUBJECTIVE AND OBJECTIVE BOX
CHIEF COMPLAINT:    HPI: Patient is a 74 yo M w/ history of Basal Cell Carcinoma of nose s/p surgical resection with nasal reconstruction (has not seen PMD in 10 years) who was admitted on 2020 after presenting with complaints of perianal rectal discharge and lower back pain x4 weeks. Also endorsed weight loss of 20 to 30 pounds recently. Denies any fevers, chills, cough, hemoptysis, SOB.     CT A/P showed a complex perianal fistula with fat stranding, erosive changes at L4-L5 concerning for discitis/osteomyelitis, and incidental b/l pulmonary nodules. Dedicated CT chest showed multiple nodules involving the right lower lobe and both left lower and upper lobe. Pulmonary consulted after IR consulted as well for sampling.      PAST MEDICAL & SURGICAL HISTORY:  H/O: Obesity  Basal Cell Cancer; Right side distal nose  Seasonal Allergies  H/O skin graft  Basal Cell Carcinoma of Anterior Chest; s/p MOHS surgery 8/10 proximal to suprasternal notch      FAMILY HISTORY:  No pertinent family history in first degree relatives      SOCIAL HISTORY:  Smoking: [X] Never Smoked but secondhand exposure from wife[ ] Former Smoker (__ packs x ___ years) [ ] Current Smoker  (__ packs x ___ years)  Substance Use: [X] Never Used [ ] Used ____  EtOH Use:   Marital Status: [ ] Single [ ]  [ ]  [ ]   Sexual History:   Occupation: Worked with cars  Recent Travel:  Country of Birth:  Advance Directives:    Allergies    dust (Rhinitis; Headache)  No Known Drug Allergies    Intolerances        HOME MEDICATIONS:  Home Medications:  Claritin 10 mg oral tablet: 1 tab(s) orally once a day (05 Aug 2020 09:33)      REVIEW OF SYSTEMS:  Constitutional: [ ] negative [ ] fevers [ ] chills [X] weight loss [ ] weight gain  HEENT: [ ] negative [ ] dry eyes [ ] eye irritation [ ] postnasal drip [ ] nasal congestion  CV: [ ] negative  [ ] chest pain [ ] orthopnea [ ] palpitations [ ] murmur  Resp: [ ] negative [ ] cough [ ] shortness of breath [ ] dyspnea [ ] wheezing [ ] sputum [ ] hemoptysis  GI: [ ] negative [ ] nausea [ ] vomiting [ ] diarrhea [ ] constipation [X] abd pain [ ] dysphagia   : [ ] negative [ ] dysuria [ ] nocturia [ ] hematuria [ ] increased urinary frequency  Musculoskeletal: [ ] negative [X] back pain [ ] myalgias [ ] arthralgias [ ] fracture  Skin: [ ] negative [ ] rash [ ] itch  Neurological: [ ] negative [ ] headache [ ] dizziness [ ] syncope [ ] weakness [ ] numbness  Psychiatric: [ ] negative [ ] anxiety [ ] depression  Endocrine: [ ] negative [ ] diabetes [ ] thyroid problem  Hematologic/Lymphatic: [ ] negative [ ] anemia [ ] bleeding problem  Allergic/Immunologic: [ ] negative [ ] itchy eyes [ ] nasal discharge [ ] hives [ ] angioedema  [X] All other systems negative  [ ] Unable to assess ROS because ________    OBJECTIVE:  ICU Vital Signs Last 24 Hrs  T(C): 36.4 (06 Aug 2020 05:52), Max: 36.6 (05 Aug 2020 21:33)  T(F): 97.5 (06 Aug 2020 05:52), Max: 97.8 (05 Aug 2020 21:33)  HR: 67 (06 Aug 2020 05:52) (67 - 86)  BP: 132/82 (06 Aug 2020 05:52) (112/74 - 132/82)  BP(mean): --  ABP: --  ABP(mean): --  RR: 18 (06 Aug 2020 05:52) (18 - 18)  SpO2: 98% (06 Aug 2020 05:52) (98% - 99%)        08-05 @ 07:01  -  0806 @ 07:00  --------------------------------------------------------  IN: 0 mL / OUT: 200 mL / NET: -200 mL      CAPILLARY BLOOD GLUCOSE          PHYSICAL EXAM:  General: NAD, resting comfortably  HEENT: EOMI, PERRLA  Respiratory: CTAB  Cardiovascular: S1S2, RRR  Abdomen: Soft, NTND, BS+  Extremities: No peripheral edema     LINES:     HOSPITAL MEDICATIONS:  Standing Meds:  heparin   Injectable 5000 Unit(s) SubCutaneous every 12 hours  loratadine 10 milliGRAM(s) Oral daily  sodium chloride 0.9% lock flush 3 milliLiter(s) IV Push every 8 hours  zinc oxide 20% Ointment 1 Application(s) Topical every 8 hours      PRN Meds:  acetaminophen   Tablet .. 650 milliGRAM(s) Oral every 6 hours PRN      LABS:                        15.6   8.15  )-----------( 269      ( 05 Aug 2020 06:55 )             48.5     Hgb Trend: 15.6<--, 16.8<--  08-06    137  |  98  |  8   ----------------------------<  85  3.5   |  25  |  0.77    Ca    9.3      06 Aug 2020 07:15  Phos  4.1     08-06  Mg     2.0     08-06      Creatinine Trend: 0.77<--, 0.81<--, 0.93<--    Urinalysis Basic - ( 05 Aug 2020 13:26 )    Color: YELLOW / Appearance: CLEAR / S.025 / pH: 6.0  Gluc: NEGATIVE / Ketone: TRACE  / Bili: NEGATIVE / Urobili: SMALL   Blood: NEGATIVE / Protein: NEGATIVE / Nitrite: NEGATIVE   Leuk Esterase: NEGATIVE / RBC: x / WBC x   Sq Epi: x / Non Sq Epi: x / Bacteria: x            MICROBIOLOGY:       RADIOLOGY:  [ ] Reviewed and interpreted by me    PULMONARY FUNCTION TESTS:    EKG:

## 2020-08-06 NOTE — PROGRESS NOTE ADULT - ATTENDING COMMENTS
Patient seen and examined, case d/w house staff.  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  Denies fever, +weight loss. No prior colonoscopy.    Assessment/plan:  # perianal fistula with feculent drainage: plan for EUA and possible procedure on Friday, f/u colorectal surgery, sits bath  # L4-L5 erosive changes: MRI Lumbar spine L4/L5 discitis/OM with epidural epidural and L paraspinal extension, monitor off abx per ID, consult IR for biopsy to r/o OM  blood cx x2, if positive, will get TTE to r/o SBE  F/U ID recs  # b/l pulmonary nodules: confirmed on CT, c/f mets, IR consult for lung biopsy, pulm consult  # DVT ppx Patient seen and examined, case d/w house staff.  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  Denies fever, +weight loss. No prior colonoscopy.    Assessment/plan:  # perianal fistula with feculent drainage: plan for EUA and possible procedure on Friday, f/u colorectal surgery, sits bath  GI consulted, pt prefers to do colonoscopy as outpt  # L4-L5 erosive changes: MRI Lumbar spine L4/L5 discitis/OM with epidural epidural and L paraspinal extension, monitor off abx per ID, consult IR for biopsy to r/o OM  blood cx x2, if positive, will get TTE to r/o SBE  F/U ID recs  # b/l pulmonary nodules: confirmed on CT, c/f mets, IR consult for lung biopsy, pulm consult  # DVT ppx

## 2020-08-06 NOTE — CONSULT NOTE ADULT - ASSESSMENT
Patient is a 74 yo M w/ history of Basal Cell Carcinoma of nose s/p surgical resection with nasal reconstruction (has not seen PMD in 10 years) who was admitted on 8/4/2020 after presenting with complaints of perianal rectal discharge and lower back pain x4 weeks. Also endorsed weight loss of 20 to 30 pounds recently.     CT A/P showed a complex perianal fistula with fat stranding, erosive changes at L4-L5 concerning for discitis/osteomyelitis, and incidental b/l pulmonary nodules. Dedicated CT chest showed multiple nodules involving the right lower lobe and both left lower and upper lobe. Pulmonary consulted after IR consulted as well for sampling.

## 2020-08-06 NOTE — PROGRESS NOTE ADULT - PROBLEM SELECTOR PLAN 3
- b/l pulmonary nodules incidentally noted on CT A/P, patient denies any current respiratory symptoms, unclear on the etiology of the nodules, will obtain CT Chest with IV contrast for further evaluation, would likely need tissue biopsy for further eval but awaiting CT Chest to assess for best biopsy site Incidental finding of b/l lung nodule on CT A/P, confirmed on CT chest  - Pulm agreed on IR biopsy, and rec for TTE if suspicious for septic emboli (if bcx positive)  - IR approved for lung biopsy, will do with spinal biopsy after Bcx clearance

## 2020-08-06 NOTE — PROGRESS NOTE ADULT - ASSESSMENT
75 M with history of Basal Cell Carcinoma of nose s/p surgical resection with nasal reconstruction who presents to the hospital with complaints of perianal rectal discharge and lower back pain x4 weeks.  Leukocytosis, no fever  Feculent perianal discharge and low back pain  CT with concern for possible L1 OM, perianal fistula, pulmonary nodules  Uncertain if active infection, concern that radiographic findings all due to metastatic malignancy  MR with ? suspicion for OM  Overall,  1) L1 Spinal lesion/OM  - Possible OM, on MRI, would biopsy considering uncertainties and unclear pathogen  - Please check BCXs x 2 (yesterday's culture were canceled)  - Monitor off abx for present time  - IR eval, biopsy to lesion to determine pathogen?  2) Fistula  - Unclear significance of fat stranding, for now would hold abx and observe  - Low threshold to restart Zosyn if signs infection/sepsis  - F/U surgery  3) Leukocytosis  - Monitor CBC, monitor for fevers    Champ Segovia MD  Pager [phone]  After 5pm and on weekends call [phone]

## 2020-08-06 NOTE — CONSULT NOTE ADULT - SUBJECTIVE AND OBJECTIVE BOX
Chief Complaint:  Patient is a 75y old  Male who presents with a chief complaint of Perianal discharge and low back pain x4 weeks (06 Aug 2020 08:23)      HPI: 74 y/o M w/ Hx BCC s/p resection who p/w rectal discharge and pain x 4 weeks. GI now consulted for colonoscopy to r/o malignancy in the setting of complex perianal fistula.     Pt reports foul smelling discharge approximately 1 month ago, accompanied by a stinging burning rectal pain (often worsens with standing from sitting position and unrelated to BMs). Pt reports no prior Hx of such symptoms. Has brown BMs, however, often accompanied by this discharge. Endorses weight loss of unknown amount recently and decreased appetite recently. Denies BRBPR, melena, hematemesis, n/v/d, f/c. Reports no Hx of colonoscopy in the past.    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 Surgery consulted, planning for EUA (+/- seton) on Friday.       Allergies:  dust (Rhinitis; Headache)  No Known Drug Allergies      Home Medications:    Hospital Medications:  acetaminophen   Tablet .. 650 milliGRAM(s) Oral every 6 hours PRN  heparin   Injectable 5000 Unit(s) SubCutaneous every 12 hours  loratadine 10 milliGRAM(s) Oral daily  sodium chloride 0.9% lock flush 3 milliLiter(s) IV Push every 8 hours  zinc oxide 20% Ointment 1 Application(s) Topical every 8 hours      PMHX/PSHX:  H/O: Obesity  Basal Cell Cancer; Right side distal nose  Seasonal Allergies  H/O skin graft  Basal Cell Carcinoma of Anterior Chest; s/p MOHS surgery 8/10 proximal to suprasternal notch  Basal Cell Carcinoma of Anterior Chest; s/p MOHS surgery upper chest proximal suprasternal  notch      Family history:  No pertinent family history in first degree relatives      Denies family history of colon cancer/polyps, stomach cancer/polyps, pancreatic cancer/masses, liver cancer/disease, ovarian cancer and endometrial cancer.    Social History:     Tob: Denies  EtOH: Denies  Illicit Drugs: Denies    ROS:     General:  No wt loss, fevers, chills, night sweats, fatigue  Eyes:  Good vision, no reported pain  ENT:  No sore throat, pain, runny nose, dysphagia  CV:  No pain, palpitations, hypo/hypertension  Pulm:  No dyspnea, cough, tachypnea, wheezing  GI:  No pain, No nausea, No vomiting, No diarrhea, No constipation, No weight loss, No fever, No pruritis, No rectal bleeding, No tarry stools, No dysphagia,  :  No pain, bleeding, incontinence, nocturia  Muscle:  No pain, weakness  Neuro:  No weakness, tingling, memory problems  Psych:  No fatigue, insomnia, mood problems, depression  Endocrine:  No polyuria, polydipsia, cold/heat intolerance  Heme:  No petechiae, ecchymosis, easy bruisability  Skin:  No rash, tattoos, scars, edema    PHYSICAL EXAM:     GENERAL:  No acute distress, elderly man   HEENT:  Normocephalic/atraumatic, no scleral icterus  CHEST:  Clear to auscultation bilaterally, no wheezes/rales/ronchi, no accessory muscle use  HEART:  Regular rate and rhythm, no murmurs/rubs/gallops  ABDOMEN:  Soft, non-tender, mildly distended, +umbilical hernia, normoactive bowel sounds,  no masses  RECTAL: +fistula tract at 10' clock w/ +foul smelling yellow-brown discharge   EXTREMITIES: No cyanosis, clubbing, or edema  SKIN:  warm/dry  NEURO:  Alert and oriented x 3, no asterixis    Vital Signs:  Vital Signs Last 24 Hrs  T(C): 36.4 (06 Aug 2020 05:52), Max: 36.6 (05 Aug 2020 21:33)  T(F): 97.5 (06 Aug 2020 05:52), Max: 97.8 (05 Aug 2020 21:33)  HR: 67 (06 Aug 2020 05:52) (67 - 86)  BP: 132/82 (06 Aug 2020 05:52) (112/74 - 132/82)  BP(mean): --  RR: 18 (06 Aug 2020 05:52) (18 - 18)  SpO2: 98% (06 Aug 2020 05:52) (98% - 99%)  Daily     Daily     LABS:                        15.6   8.15  )-----------( 269      ( 05 Aug 2020 06:55 )             48.5       08-06    137  |  98  |  8   ----------------------------<  85  3.5   |  25  |  0.77    Ca    9.3      06 Aug 2020 07:15  Phos  4.1     08-06  Mg     2.0     08-06    TPro  7.7  /  Alb  3.6  /  TBili  0.7  /  DBili  x   /  AST  15  /  ALT  12  /  AlkPhos  107  08-04    LIVER FUNCTIONS - ( 04 Aug 2020 12:50 )  Alb: 3.6 g/dL / Pro: 7.7 g/dL / ALK PHOS: 107 u/L / ALT: 12 u/L / AST: 15 u/L / GGT: x           PT/INR - ( 04 Aug 2020 12:50 )   PT: 14.5 SEC;   INR: 1.28          PTT - ( 04 Aug 2020 12:50 )  PTT:35.8 SEC  Urinalysis Basic - ( 05 Aug 2020 13:26 )    Color: YELLOW / Appearance: CLEAR / S.025 / pH: 6.0  Gluc: NEGATIVE / Ketone: TRACE  / Bili: NEGATIVE / Urobili: SMALL   Blood: NEGATIVE / Protein: NEGATIVE / Nitrite: NEGATIVE   Leuk Esterase: NEGATIVE / RBC: x / WBC x   Sq Epi: x / Non Sq Epi: x / Bacteria: x                              15.6   8.15  )-----------( 269      ( 05 Aug 2020 06:55 )             48.5                         16.8   12.02 )-----------( 284      ( 04 Aug 2020 12:50 )             49.8       Imaging:    < from: CT Abdomen and Pelvis w/ IV Cont (20 @ 16:03) >  EXAM:  CT ABDOMEN AND PELVIS IC        PROCEDURE DATE:  Aug  4 2020         INTERPRETATION:  CLINICAL INFORMATION: Rectal discharge. Fistula with stool.    COMPARISON: None.    PROCEDURE:  CT of the Abdomen and Pelvis was performed with intravenouscontrast.  Intravenous contrast: 90 ml Omnipaque 350. 10 ml discarded.  Oral contrast: None.  Sagittal and coronal reformats were performed.    FINDINGS:  LOWER CHEST: Partially imaged large pulmonary nodules, suspicious for metastatic disease. A left lower lobe nodule measures 3.1 cm and a right lower lobe nodule measures 2.3 cm.    LIVER: Within normal limits.  BILE DUCTS: Normal caliber.  GALLBLADDER: Within normal limits.  SPLEEN: Within normal limits.  PANCREAS: Within normal limits.  ADRENALS: Within normal limits.  KIDNEYS/URETERS: Within normal limits.    BLADDER: Within normal limits.  REPRODUCTIVE ORGANS: Prostate within normal limits.    BOWEL: Complex perianal fistula with extensive inflammation. Periampullary duodenal diverticulum. No bowel obstruction. Appendix is normal. Colonic diverticulosis.  PERITONEUM: No ascites.  VESSELS: Atherosclerotic changes.  RETROPERITONEUM/LYMPH NODES: Enlarged left common iliac node (series 2, image 79), measuring 1.2 x 1.2 cm.  ABDOMINAL WALL: Fat-containing umbilical hernia.  BONES: Erosive changes along the inferior endplate of L4 and the superior endplate of L5. Degenerative changes.    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    Erosive changes at L4-L5, suspicious for discitis osteomyelitis.    Bilateral pulmonary nodules, suspicious for metastatic disease.

## 2020-08-07 ENCOUNTER — RESULT REVIEW (OUTPATIENT)
Age: 75
End: 2020-08-07

## 2020-08-07 LAB
ANION GAP SERPL CALC-SCNC: 16 MMO/L — HIGH (ref 7–14)
APTT BLD: 32.8 SEC — SIGNIFICANT CHANGE UP (ref 27–36.3)
BLD GP AB SCN SERPL QL: NEGATIVE — SIGNIFICANT CHANGE UP
BUN SERPL-MCNC: 9 MG/DL — SIGNIFICANT CHANGE UP (ref 7–23)
CALCIUM SERPL-MCNC: 9.2 MG/DL — SIGNIFICANT CHANGE UP (ref 8.4–10.5)
CHLORIDE SERPL-SCNC: 100 MMOL/L — SIGNIFICANT CHANGE UP (ref 98–107)
CO2 SERPL-SCNC: 23 MMOL/L — SIGNIFICANT CHANGE UP (ref 22–31)
CREAT SERPL-MCNC: 0.79 MG/DL — SIGNIFICANT CHANGE UP (ref 0.5–1.3)
GLUCOSE SERPL-MCNC: 87 MG/DL — SIGNIFICANT CHANGE UP (ref 70–99)
HCT VFR BLD CALC: 42.7 % — SIGNIFICANT CHANGE UP (ref 39–50)
HGB BLD-MCNC: 14.3 G/DL — SIGNIFICANT CHANGE UP (ref 13–17)
INR BLD: 1.29 — HIGH (ref 0.88–1.16)
MAGNESIUM SERPL-MCNC: 2.1 MG/DL — SIGNIFICANT CHANGE UP (ref 1.6–2.6)
MCHC RBC-ENTMCNC: 32 PG — SIGNIFICANT CHANGE UP (ref 27–34)
MCHC RBC-ENTMCNC: 33.5 % — SIGNIFICANT CHANGE UP (ref 32–36)
MCV RBC AUTO: 95.5 FL — SIGNIFICANT CHANGE UP (ref 80–100)
NRBC # FLD: 0 K/UL — SIGNIFICANT CHANGE UP (ref 0–0)
PHOSPHATE SERPL-MCNC: 3.7 MG/DL — SIGNIFICANT CHANGE UP (ref 2.5–4.5)
PLATELET # BLD AUTO: 243 K/UL — SIGNIFICANT CHANGE UP (ref 150–400)
PMV BLD: 10 FL — SIGNIFICANT CHANGE UP (ref 7–13)
POTASSIUM SERPL-MCNC: 3.2 MMOL/L — LOW (ref 3.5–5.3)
POTASSIUM SERPL-SCNC: 3.2 MMOL/L — LOW (ref 3.5–5.3)
PROTHROM AB SERPL-ACNC: 14.7 SEC — HIGH (ref 10.6–13.6)
RBC # BLD: 4.47 M/UL — SIGNIFICANT CHANGE UP (ref 4.2–5.8)
RBC # FLD: 14 % — SIGNIFICANT CHANGE UP (ref 10.3–14.5)
RH IG SCN BLD-IMP: NEGATIVE — SIGNIFICANT CHANGE UP
SODIUM SERPL-SCNC: 139 MMOL/L — SIGNIFICANT CHANGE UP (ref 135–145)
WBC # BLD: 7.04 K/UL — SIGNIFICANT CHANGE UP (ref 3.8–10.5)
WBC # FLD AUTO: 7.04 K/UL — SIGNIFICANT CHANGE UP (ref 3.8–10.5)

## 2020-08-07 PROCEDURE — 99232 SBSQ HOSP IP/OBS MODERATE 35: CPT | Mod: GC

## 2020-08-07 PROCEDURE — 46606 ANOSCOPY AND BIOPSY: CPT

## 2020-08-07 PROCEDURE — 99233 SBSQ HOSP IP/OBS HIGH 50: CPT | Mod: GC

## 2020-08-07 PROCEDURE — 88305 TISSUE EXAM BY PATHOLOGIST: CPT | Mod: 26

## 2020-08-07 PROCEDURE — 99232 SBSQ HOSP IP/OBS MODERATE 35: CPT

## 2020-08-07 PROCEDURE — 93010 ELECTROCARDIOGRAM REPORT: CPT

## 2020-08-07 PROCEDURE — 46270 REMOVE ANAL FIST SUBQ: CPT

## 2020-08-07 RX ORDER — HYDROMORPHONE HYDROCHLORIDE 2 MG/ML
1 INJECTION INTRAMUSCULAR; INTRAVENOUS; SUBCUTANEOUS
Refills: 0 | Status: DISCONTINUED | OUTPATIENT
Start: 2020-08-07 | End: 2020-08-08

## 2020-08-07 RX ORDER — LIDOCAINE 4 G/100G
1 CREAM TOPICAL DAILY
Refills: 0 | Status: DISCONTINUED | OUTPATIENT
Start: 2020-08-07 | End: 2020-08-19

## 2020-08-07 RX ORDER — KETOROLAC TROMETHAMINE 30 MG/ML
15 SYRINGE (ML) INJECTION EVERY 6 HOURS
Refills: 0 | Status: DISCONTINUED | OUTPATIENT
Start: 2020-08-07 | End: 2020-08-07

## 2020-08-07 RX ORDER — HYDROMORPHONE HYDROCHLORIDE 2 MG/ML
0.5 INJECTION INTRAMUSCULAR; INTRAVENOUS; SUBCUTANEOUS
Refills: 0 | Status: DISCONTINUED | OUTPATIENT
Start: 2020-08-07 | End: 2020-08-08

## 2020-08-07 RX ORDER — ENOXAPARIN SODIUM 100 MG/ML
40 INJECTION SUBCUTANEOUS DAILY
Refills: 0 | Status: DISCONTINUED | OUTPATIENT
Start: 2020-08-07 | End: 2020-08-09

## 2020-08-07 RX ORDER — SODIUM CHLORIDE 9 MG/ML
1000 INJECTION, SOLUTION INTRAVENOUS
Refills: 0 | Status: DISCONTINUED | OUTPATIENT
Start: 2020-08-07 | End: 2020-08-08

## 2020-08-07 RX ORDER — FAMOTIDINE 10 MG/ML
20 INJECTION INTRAVENOUS
Refills: 0 | Status: COMPLETED | OUTPATIENT
Start: 2020-08-07 | End: 2020-08-14

## 2020-08-07 RX ORDER — POTASSIUM CHLORIDE 20 MEQ
10 PACKET (EA) ORAL
Refills: 0 | Status: COMPLETED | OUTPATIENT
Start: 2020-08-07 | End: 2020-08-07

## 2020-08-07 RX ADMIN — ZINC OXIDE 1 APPLICATION(S): 200 OINTMENT TOPICAL at 06:31

## 2020-08-07 RX ADMIN — LIDOCAINE 1 PATCH: 4 CREAM TOPICAL at 11:39

## 2020-08-07 RX ADMIN — Medication 1 ENEMA: at 10:59

## 2020-08-07 RX ADMIN — Medication 1 ENEMA: at 06:30

## 2020-08-07 RX ADMIN — Medication 100 MILLIEQUIVALENT(S): at 12:51

## 2020-08-07 RX ADMIN — HYDROMORPHONE HYDROCHLORIDE 0.5 MILLIGRAM(S): 2 INJECTION INTRAMUSCULAR; INTRAVENOUS; SUBCUTANEOUS at 20:58

## 2020-08-07 RX ADMIN — SODIUM CHLORIDE 3 MILLILITER(S): 9 INJECTION INTRAMUSCULAR; INTRAVENOUS; SUBCUTANEOUS at 06:26

## 2020-08-07 RX ADMIN — SODIUM CHLORIDE 3 MILLILITER(S): 9 INJECTION INTRAMUSCULAR; INTRAVENOUS; SUBCUTANEOUS at 22:51

## 2020-08-07 RX ADMIN — SODIUM CHLORIDE 3 MILLILITER(S): 9 INJECTION INTRAMUSCULAR; INTRAVENOUS; SUBCUTANEOUS at 12:46

## 2020-08-07 RX ADMIN — Medication 100 MILLIEQUIVALENT(S): at 10:59

## 2020-08-07 RX ADMIN — Medication 15 MILLIGRAM(S): at 11:39

## 2020-08-07 RX ADMIN — ZINC OXIDE 1 APPLICATION(S): 200 OINTMENT TOPICAL at 22:51

## 2020-08-07 RX ADMIN — Medication 15 MILLIGRAM(S): at 12:00

## 2020-08-07 RX ADMIN — Medication 100 MILLIEQUIVALENT(S): at 15:09

## 2020-08-07 RX ADMIN — ZINC OXIDE 1 APPLICATION(S): 200 OINTMENT TOPICAL at 15:11

## 2020-08-07 RX ADMIN — SODIUM CHLORIDE 50 MILLILITER(S): 9 INJECTION, SOLUTION INTRAVENOUS at 22:51

## 2020-08-07 NOTE — PROGRESS NOTE ADULT - SUBJECTIVE AND OBJECTIVE BOX
Internal Medicine Progress Note    =======================================================  CONTACT KENYATTA Wolf M.D., PGY-1  Pager:  [phone] ([facility])  =======================================================    Patient is a 75y old  Male who presents with a chief complaint of Perianal discharge and low back pain x4 weeks (06 Aug 2020 15:50)      SUBJECTIVE / OVERNIGHT EVENTS:    MEDICATIONS  (STANDING):  loratadine 10 milliGRAM(s) Oral daily  saline laxative (FLEET) Rectal Enema 1 Enema Rectal once  sodium chloride 0.9% lock flush 3 milliLiter(s) IV Push every 8 hours  zinc oxide 20% Ointment 1 Application(s) Topical every 8 hours    MEDICATIONS  (PRN):  acetaminophen   Tablet .. 650 milliGRAM(s) Oral every 6 hours PRN Temp greater or equal to 38C (100.4F), Mild Pain (1 - 3), Moderate Pain (4 - 6)    Vital Signs Last 24 Hrs  T(C): 36.4 (07 Aug 2020 06:29), Max: 36.9 (06 Aug 2020 21:32)  T(F): 97.5 (07 Aug 2020 06:29), Max: 98.5 (06 Aug 2020 21:32)  HR: 76 (07 Aug 2020 06:29) (76 - 79)  BP: 137/85 (07 Aug 2020 06:29) (117/77 - 137/85)  BP(mean): --  RR: 18 (07 Aug 2020 06:29) (18 - 18)  SpO2: 99% (07 Aug 2020 06:29) (95% - 99%)    CAPILLARY BLOOD GLUCOSE        I&O's Summary    05 Aug 2020 07:01  -  06 Aug 2020 07:00  --------------------------------------------------------  IN: 0 mL / OUT: 200 mL / NET: -200 mL    06 Aug 2020 07:01  -  07 Aug 2020 06:54  --------------------------------------------------------  IN: 0 mL / OUT: 200 mL / NET: -200 mL        PHYSICAL EXAM  GENERAL: NAD  HEAD:  Atraumatic  EYES: EOMI, PERRLA, conjunctiva and sclera clear  NECK: Supple, No JVD  CHEST/LUNG: Clear to auscultation bilaterally; No wheeze  HEART: Regular rate and rhythm; No murmurs, rubs, or gallops  ABDOMEN: Soft, Nontender, Nondistended; Bowel sounds present  EXTREMITIES:  2+ Peripheral Pulses, No clubbing, cyanosis, or edema  NEURO/PSYCH: AAOx3, nonfocal  SKIN: No rashes or lesions      LABS:                        15.6   8.15  )-----------( 269      ( 05 Aug 2020 06:55 )             48.5     Hemoglobin: 15.6 g/dL ( @ 06:55)  Hemoglobin: 16.8 g/dL ( @ 12:50)    CBC Full  -  ( 05 Aug 2020 06:55 )  WBC Count : 8.15 K/uL  RBC Count : 5.03 M/uL  Hemoglobin : 15.6 g/dL  Hematocrit : 48.5 %  Platelet Count - Automated : 269 K/uL  Mean Cell Volume : 96.4 fL  Mean Cell Hemoglobin : 31.0 pg  Mean Cell Hemoglobin Concentration : 32.2 %  Auto Neutrophil # : x  Auto Lymphocyte # : x  Auto Monocyte # : x  Auto Eosinophil # : x  Auto Basophil # : x  Auto Neutrophil % : x  Auto Lymphocyte % : x  Auto Monocyte % : x  Auto Eosinophil % : x  Auto Basophil % : x    08-    137  |  98  |  8   ----------------------------<  85  3.5   |  25  |  0.77    Ca    9.3      06 Aug 2020 07:15  Phos  4.1     08-06  Mg     2.0     08-06      Creatinine Trend: 0.77<--, 0.81<--, 0.93<--        hs Troponin:            Urinalysis Basic - ( 05 Aug 2020 13:26 )    Color: YELLOW / Appearance: CLEAR / S.025 / pH: 6.0  Gluc: NEGATIVE / Ketone: TRACE  / Bili: NEGATIVE / Urobili: SMALL   Blood: NEGATIVE / Protein: NEGATIVE / Nitrite: NEGATIVE   Leuk Esterase: NEGATIVE / RBC: x / WBC x   Sq Epi: x / Non Sq Epi: x / Bacteria: x      CSF:                      EKG:   MICROBIOLOGY:    IMAGING:      Labs, imaging, EKG personally reviewed Internal Medicine Progress Note    =======================================================  CONTACT KENYATTA Wolf M.D., PGY-1  Pager:  [phone] ([facility])  =======================================================    Patient is a 75y old  Male who presents with a chief complaint of Perianal discharge and low back pain x4 weeks (06 Aug 2020 15:50)      SUBJECTIVE / OVERNIGHT EVENTS:  NAEO.    This am, pt reports no changes. He continues to report feculent drainage per rectum. Denies any abdominal pain, neuro deficits, SOB, CP.    MEDICATIONS  (STANDING):  loratadine 10 milliGRAM(s) Oral daily  saline laxative (FLEET) Rectal Enema 1 Enema Rectal once  sodium chloride 0.9% lock flush 3 milliLiter(s) IV Push every 8 hours  zinc oxide 20% Ointment 1 Application(s) Topical every 8 hours    MEDICATIONS  (PRN):  acetaminophen   Tablet .. 650 milliGRAM(s) Oral every 6 hours PRN Temp greater or equal to 38C (100.4F), Mild Pain (1 - 3), Moderate Pain (4 - 6)    Vital Signs Last 24 Hrs  T(C): 36.4 (07 Aug 2020 06:29), Max: 36.9 (06 Aug 2020 21:32)  T(F): 97.5 (07 Aug 2020 06:29), Max: 98.5 (06 Aug 2020 21:32)  HR: 76 (07 Aug 2020 06:29) (76 - 79)  BP: 137/85 (07 Aug 2020 06:29) (117/77 - 137/85)  BP(mean): --  RR: 18 (07 Aug 2020 06:29) (18 - 18)  SpO2: 99% (07 Aug 2020 06:29) (95% - 99%)    CAPILLARY BLOOD GLUCOSE        I&O's Summary    05 Aug 2020 07:01  -  06 Aug 2020 07:00  --------------------------------------------------------  IN: 0 mL / OUT: 200 mL / NET: -200 mL    06 Aug 2020 07:01  -  07 Aug 2020 06:54  --------------------------------------------------------  IN: 0 mL / OUT: 200 mL / NET: -200 mL        PHYSICAL EXAM  GENERAL: NAD  HEAD:  Atraumatic  EYES: EOMI, PERRLA, conjunctiva and sclera clear  NECK: Supple, No JVD  CHEST/LUNG: Clear to auscultation bilaterally; No wheeze  HEART: Regular rate and rhythm; No murmurs, rubs, or gallops  ABDOMEN: Soft, Nontender, Nondistended; Bowel sounds present  EXTREMITIES:  2+ Peripheral Pulses, No clubbing, cyanosis, or edema  NEURO/PSYCH: AAOx3, nonfocal  SKIN: No rashes or lesions      LABS:                        15.6   8.15  )-----------( 269      ( 05 Aug 2020 06:55 )             48.5     Hemoglobin: 15.6 g/dL ( @ 06:55)  Hemoglobin: 16.8 g/dL ( @ 12:50)    CBC Full  -  ( 05 Aug 2020 06:55 )  WBC Count : 8.15 K/uL  RBC Count : 5.03 M/uL  Hemoglobin : 15.6 g/dL  Hematocrit : 48.5 %  Platelet Count - Automated : 269 K/uL  Mean Cell Volume : 96.4 fL  Mean Cell Hemoglobin : 31.0 pg  Mean Cell Hemoglobin Concentration : 32.2 %  Auto Neutrophil # : x  Auto Lymphocyte # : x  Auto Monocyte # : x  Auto Eosinophil # : x  Auto Basophil # : x  Auto Neutrophil % : x  Auto Lymphocyte % : x  Auto Monocyte % : x  Auto Eosinophil % : x  Auto Basophil % : x        137  |  98  |  8   ----------------------------<  85  3.5   |  25  |  0.77    Ca    9.3      06 Aug 2020 07:15  Phos  4.1     08-06  Mg     2.0     08-06      Creatinine Trend: 0.77<--, 0.81<--, 0.93<--        hs Troponin:            Urinalysis Basic - ( 05 Aug 2020 13:26 )    Color: YELLOW / Appearance: CLEAR / S.025 / pH: 6.0  Gluc: NEGATIVE / Ketone: TRACE  / Bili: NEGATIVE / Urobili: SMALL   Blood: NEGATIVE / Protein: NEGATIVE / Nitrite: NEGATIVE   Leuk Esterase: NEGATIVE / RBC: x / WBC x   Sq Epi: x / Non Sq Epi: x / Bacteria: x      CSF:                      EKG:   MICROBIOLOGY:    IMAGING:      Labs, imaging, EKG personally reviewed

## 2020-08-07 NOTE — PROGRESS NOTE ADULT - PROBLEM SELECTOR PLAN 1
Perianal fistula seen on CT A/P  - Pending EUA and possible drainage in OR w/ Sx, likely 8/7, NPO p MN and hold A/C; Enema prior to OR  - CEA elevated; GI consulted for colonoscopy for malignancy workup, maybe inpatient or outpatient per clinical course Perianal fistula seen on CT A/P  - Pending EUA and possible drainage in OR w/ Sx, likely 8/7 at 4 PM, NPO p MN and hold A/C; Enema prior to OR  - CEA elevated; GI consulted for colonoscopy for malignancy workup, maybe inpatient or outpatient per clinical course Perianal fistula seen on CT A/P  -Pending EUA and possible drainage in OR w/ Sx, likely 8/7 at 4 PM  -NPO p MN and hold A/C; Enema given prior to OR  - CEA elevated; GI consulted for colonoscopy for malignancy workup, pt prefers outpatient appt for colo. Perianal fistula seen on CT A/P  -Pending EUA and possible drainage in OR w/ Sx, likely 8/7 at 4 PM  -NPO @ MN and hold A/C; Enema given prior to OR  - CEA elevated; GI consulted for colonoscopy for malignancy workup, pt prefers outpatient appt for colo.

## 2020-08-07 NOTE — PROGRESS NOTE ADULT - ASSESSMENT
Impression:   #Perianal fistula: pt found to have complex perianal fistula on CT a/p after 4 weeks of rectal discharge and pain. Found to have leukocytosis, Hb 15-16s w/ no prior Hx of colonoscopy. Plan for surgery to do EUA w/ +/- seton placement for perianal fistula. GI consulted to r/o malignancy. Ddx perianal fistula may be colorectal malignancy vs less likely IBD/Crohn's (other etiologies could include radiation proctitis vs anorectal foreign body vs infectious etiologies such as anorectal TB vs LGV however these are less likely without risk factors).     Recommendations:   - Plan for EUA / seton per colorectal surgery today   - Pt to receive outpatient colonoscopy with GI to r/o malignancy, can schedule appointment at [phone] and provide pt w/ number (will email for appt as well)  - Rest of plan per primary team        Thank you for involving us in the care of this patient, please reach out if any further questions.     Yuval Cotton MD  Gastroenterology Fellow, PGY4    Available on [employer] Teams  [phone] ([facility])  [phone] ([facility])  Please contact on call fellow weekdays after 5pm-7am and weekends: [phone] Impression:   #Perianal fistula: pt found to have complex perianal fistula on CT a/p after 4 weeks of rectal discharge and pain. Found to have leukocytosis, Hb 15-16s w/ no prior Hx of colonoscopy. Plan for surgery to do EUA w/ +/- seton placement for perianal fistula. GI consulted to r/o malignancy. Ddx perianal fistula may be colorectal malignancy vs less likely IBD/Crohn's (other etiologies could include radiation proctitis vs anorectal foreign body vs infectious etiologies such as anorectal TB vs LGV however these are less likely without risk factors).     Recommendations:   - Plan for EUA / seton per colorectal surgery today   - Pt to receive outpatient colonoscopy with GI to r/o malignancy, can schedule appointment at [phone] and provide pt w/ number (GI will email for appt as well)  - Rest of plan per primary team    Thank you for involving us in the care of this patient, please reach out if any further questions.     Yuval Cotton MD  Gastroenterology Fellow, PGY4    Available on [employer] Teams  [phone] ([facility])  [phone] ([facility])  Please contact on call fellow weekdays after 5pm-7am and weekends: [phone]

## 2020-08-07 NOTE — PROGRESS NOTE ADULT - ASSESSMENT
75 M with history of Basal Cell Carcinoma of nose s/p surgical resection with nasal reconstruction who presents to the hospital with complaints of perianal rectal discharge and lower back pain x4 weeks  Leukocytosis, no fever  Feculent perianal discharge and low back pain  CT with concern for possible L1 OM, perianal fistula, pulmonary nodules  Uncertain if active infection, concern that radiographic findings all due to metastatic malignancy  MR with ? suspicion for OM, collection  Overall,  1) L1 Spinal lesion/OM  - Possible OM, on MRI, would biopsy considering uncertainties and unclear pathogen  - F/U BCXs  - Monitor off abx for present time, consider broad abx if signs sepsis  - IR eval, biopsy to lesion to determine pathogen?  - Close neurochecks to ensure to signs neurocompromise (if so, would consult Neurosurgery stat)  2) Fistula  - Unclear significance of fat stranding, for now would hold abx and observe  - Low threshold to restart Zosyn if signs infection/sepsis  - F/U surgery  3) Leukocytosis  - Monitor CBC, monitor for fevers    Champ Segovia MD  Pager [phone]  After 5pm and on weekends call [phone]

## 2020-08-07 NOTE — PROGRESS NOTE ADULT - PROBLEM SELECTOR PLAN 3
Incidental finding of b/l lung nodule on CT A/P, confirmed on CT chest  - Pulm agreed on IR biopsy, and rec for TTE if suspicious for septic emboli (if bcx positive)  - IR approved for lung biopsy, will do with spinal biopsy after Bcx clearance

## 2020-08-07 NOTE — PROGRESS NOTE ADULT - ASSESSMENT
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 Also with pulmonary nodules of unclear etiology. Pending EUA w/ sx, lung/spine biospy w/ IR, and colonoscopy w/ GI.

## 2020-08-07 NOTE — PROGRESS NOTE ADULT - PROBLEM SELECTOR PLAN 2
L4/5 discitis w/ possible abscesses on MR  - Observe off abx and obtain Bcx x 2 per ID  - No current neurological deficits at present, will monitor on neuro checks q4h for now  - IR approved for spinal biopsy, will proceed after Bcx clearance, likely Monday L4/5 discitis w/ possible abscesses on MR  - Observe off abx and obtain Bcx x 2 per ID  - No current neurological deficits at present, will monitor on neuro checks q4h for now  - IR approved for spinal biopsy, will proceed after Bcx clearance, likely Monday  -no relief of lower back pain with Tylenol. Toradol q6 prn and lidocaine patch

## 2020-08-07 NOTE — PROGRESS NOTE ADULT - PROBLEM SELECTOR PLAN 5
DVT ppx - HSQ, hold for OR  Diet - CC + DASH diet, NPO p MN  Activity - Ambulate as tolerated  Dispo: Pending workup DVT ppx - HSQ, hold for OR  Diet - CC + DASH diet, NPO at MN for surg procedure  Activity - Ambulate as tolerated  Dispo: Pending workup

## 2020-08-07 NOTE — PROGRESS NOTE ADULT - SUBJECTIVE AND OBJECTIVE BOX
Chief Complaint:  Patient is a 75y old  Male who presents with a chief complaint of Perianal discharge and low back pain x4 weeks (06 Aug 2020 08:23)    Interval Hx:   - Pt to undergo EUA in OR today for perianal fistula  - Continues to have foul smelling drainage from rectum, burning rectal pain slightly improved from prior   - Pt wants outpatient colonoscopy, colorectal surgery ok with OP colonoscopy       Allergies:  dust (Rhinitis; Headache)  No Known Drug Allergies      Home Medications:    Hospital Medications:  acetaminophen   Tablet .. 650 milliGRAM(s) Oral every 6 hours PRN  heparin   Injectable 5000 Unit(s) SubCutaneous every 12 hours  loratadine 10 milliGRAM(s) Oral daily  sodium chloride 0.9% lock flush 3 milliLiter(s) IV Push every 8 hours  zinc oxide 20% Ointment 1 Application(s) Topical every 8 hours      PMHX/PSHX:  H/O: Obesity  Basal Cell Cancer; Right side distal nose  Seasonal Allergies  H/O skin graft  Basal Cell Carcinoma of Anterior Chest; s/p MOHS surgery 8/10 proximal to suprasternal notch  Basal Cell Carcinoma of Anterior Chest; s/p MOHS surgery upper chest proximal suprasternal  notch      Family history:  No pertinent family history in first degree relatives      Denies family history of colon cancer/polyps, stomach cancer/polyps, pancreatic cancer/masses, liver cancer/disease, ovarian cancer and endometrial cancer.    Social History:     Tob: Denies  EtOH: Denies  Illicit Drugs: Denies    ROS:     General:  No wt loss, fevers, chills, night sweats, fatigue  Eyes:  Good vision, no reported pain  ENT:  No sore throat, pain, runny nose, dysphagia  CV:  No pain, palpitations, hypo/hypertension  Pulm:  No dyspnea, cough, tachypnea, wheezing  GI:  No pain, No nausea, No vomiting, No diarrhea, No constipation, No weight loss, No fever, No pruritis, No rectal bleeding, No tarry stools, No dysphagia,  :  No pain, bleeding, incontinence, nocturia  Muscle:  No pain, weakness  Neuro:  No weakness, tingling, memory problems  Psych:  No fatigue, insomnia, mood problems, depression  Endocrine:  No polyuria, polydipsia, cold/heat intolerance  Heme:  No petechiae, ecchymosis, easy bruisability  Skin:  No rash, tattoos, scars, edema    PHYSICAL EXAM:     GENERAL:  No acute distress, elderly man   HEENT:  Normocephalic/atraumatic, no scleral icterus  CHEST:  Clear to auscultation bilaterally, no wheezes/rales/ronchi, no accessory muscle use  HEART:  Regular rate and rhythm, no murmurs/rubs/gallops  ABDOMEN:  Soft, non-tender, mildly distended, +umbilical hernia, normoactive bowel sounds,  no masses  RECTAL (): +fistula tract at 10' clock w/ +foul smelling yellow-brown discharge   EXTREMITIES: No cyanosis, clubbing, or edema  SKIN:  warm/dry  NEURO:  Alert and oriented x 3, no asterixis    Vital Signs:  Vital Signs Last 24 Hrs  T(C): 36.4 (06 Aug 2020 05:52), Max: 36.6 (05 Aug 2020 21:33)  T(F): 97.5 (06 Aug 2020 05:52), Max: 97.8 (05 Aug 2020 21:33)  HR: 67 (06 Aug 2020 05:52) (67 - 86)  BP: 132/82 (06 Aug 2020 05:52) (112/74 - 132/82)  BP(mean): --  RR: 18 (06 Aug 2020 05:52) (18 - 18)  SpO2: 98% (06 Aug 2020 05:52) (98% - 99%)  Daily     Daily     LABS:                        15.6   8.15  )-----------( 269      ( 05 Aug 2020 06:55 )             48.5       08-06    137  |  98  |  8   ----------------------------<  85  3.5   |  25  |  0.77    Ca    9.3      06 Aug 2020 07:15  Phos  4.1     08-06  Mg     2.0     08-06    TPro  7.7  /  Alb  3.6  /  TBili  0.7  /  DBili  x   /  AST  15  /  ALT  12  /  AlkPhos  107  08-04    LIVER FUNCTIONS - ( 04 Aug 2020 12:50 )  Alb: 3.6 g/dL / Pro: 7.7 g/dL / ALK PHOS: 107 u/L / ALT: 12 u/L / AST: 15 u/L / GGT: x           PT/INR - ( 04 Aug 2020 12:50 )   PT: 14.5 SEC;   INR: 1.28          PTT - ( 04 Aug 2020 12:50 )  PTT:35.8 SEC  Urinalysis Basic - ( 05 Aug 2020 13:26 )    Color: YELLOW / Appearance: CLEAR / S.025 / pH: 6.0  Gluc: NEGATIVE / Ketone: TRACE  / Bili: NEGATIVE / Urobili: SMALL   Blood: NEGATIVE / Protein: NEGATIVE / Nitrite: NEGATIVE   Leuk Esterase: NEGATIVE / RBC: x / WBC x   Sq Epi: x / Non Sq Epi: x / Bacteria: x                              15.6   8.15  )-----------( 269      ( 05 Aug 2020 06:55 )             48.5                         16.8   12.02 )-----------( 284      ( 04 Aug 2020 12:50 )             49.8       Imaging:    < from: CT Abdomen and Pelvis w/ IV Cont (20 @ 16:03) >  EXAM:  CT ABDOMEN AND PELVIS IC        PROCEDURE DATE:  Aug  4 2020         INTERPRETATION:  CLINICAL INFORMATION: Rectal discharge. Fistula with stool.    COMPARISON: None.    PROCEDURE:  CT of the Abdomen and Pelvis was performed with intravenouscontrast.  Intravenous contrast: 90 ml Omnipaque 350. 10 ml discarded.  Oral contrast: None.  Sagittal and coronal reformats were performed.    FINDINGS:  LOWER CHEST: Partially imaged large pulmonary nodules, suspicious for metastatic disease. A left lower lobe nodule measures 3.1 cm and a right lower lobe nodule measures 2.3 cm.    LIVER: Within normal limits.  BILE DUCTS: Normal caliber.  GALLBLADDER: Within normal limits.  SPLEEN: Within normal limits.  PANCREAS: Within normal limits.  ADRENALS: Within normal limits.  KIDNEYS/URETERS: Within normal limits.    BLADDER: Within normal limits.  REPRODUCTIVE ORGANS: Prostate within normal limits.    BOWEL: Complex perianal fistula with extensive inflammation. Periampullary duodenal diverticulum. No bowel obstruction. Appendix is normal. Colonic diverticulosis.  PERITONEUM: No ascites.  VESSELS: Atherosclerotic changes.  RETROPERITONEUM/LYMPH NODES: Enlarged left common iliac node (series 2, image 79), measuring 1.2 x 1.2 cm.  ABDOMINAL WALL: Fat-containing umbilical hernia.  BONES: Erosive changes along the inferior endplate of L4 and the superior endplate of L5. Degenerative changes.    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    Erosive changes at L4-L5, suspicious for discitis osteomyelitis.    Bilateral pulmonary nodules, suspicious for metastatic disease.

## 2020-08-07 NOTE — PROGRESS NOTE ADULT - PROBLEM SELECTOR PLAN 6
1.  Name of PCP: no current PCP (previous PCP retired 1 year ago)  2.  PCP Contacted on Admission: [ ] Y    [X] N    3.  PCP contacted at Discharge: [ ] Y    [ ] N    [ ] N/A  4.  Post-Discharge Appointment Date and Location:  5.  Summary of Handoff given to PCP:

## 2020-08-07 NOTE — PROGRESS NOTE ADULT - ATTENDING COMMENTS
Patient seen and examined, case d/w house staff.  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  Denies fever, +weight loss. No prior colonoscopy.    Assessment/plan:  # perianal fistula with feculent drainage: plan for OR today, EUA and possible biopsy/[facility] per colorectal surgery  outpt GI for colonoscopy to r/o malignancy (CEA elevated to 9.3), can schedule appointment at [phone] and provide pt w/ number (GI will email for appt as well)  # L4-L5 erosive changes with low back pain: MRI Lumbar spine L4/L5 discitis/OM with epidural epidural and L paraspinal extension, monitor off abx per ID (but if spikes fever or new neuro findings) then consult neurosurgery and restart abx (vanco/zosyn)  Pain control, add toradol and lidocaine patch, GI ppx  F/U ID recs  f/u blood cultures (if culture positive will need TTE to r/o SBE)  plan for IR biopsy lumbar spine and lung lesion on Monday    # b/l pulmonary nodules: confirmed on CT, c/f mets, plan for IR lung bx on Monday  # DVT ppx

## 2020-08-07 NOTE — PROGRESS NOTE ADULT - SUBJECTIVE AND OBJECTIVE BOX
CC: F/U for OM    Saw/spoke to patient. No fevers, no chills. No new complaints. No LE weakness.    Allergies  dust (Rhinitis; Headache)  No Known Drug Allergies    ANTIMICROBIALS:  Off    PE:    Vital Signs Last 24 Hrs  T(C): 36.4 (07 Aug 2020 06:29), Max: 36.9 (06 Aug 2020 21:32)  T(F): 97.5 (07 Aug 2020 06:29), Max: 98.5 (06 Aug 2020 21:32)  HR: 76 (07 Aug 2020 06:29) (76 - 79)  BP: 137/85 (07 Aug 2020 06:29) (117/77 - 137/85)  RR: 18 (07 Aug 2020 06:29) (18 - 18)  SpO2: 99% (07 Aug 2020 06:29) (95% - 99%)    Gen: AOx3, NAD, non-toxic  CV: S1+S2 normal, nontachycardic  Resp: Clear bilat, no resp distress, no crackles/wheezes  Abd: Soft, nontender, +BS  Ext: No LE edema, no wounds    LABS:                        14.3   7.04  )-----------( 243      ( 07 Aug 2020 05:15 )             42.7     08-07    139  |  100  |  9   ----------------------------<  87  3.2<L>   |  23  |  0.79    Ca    9.2      07 Aug 2020 05:15  Phos  3.7     08-07  Mg     2.1     08-07    Urinalysis Basic - ( 05 Aug 2020 13:26 )    Color: YELLOW / Appearance: CLEAR / S.025 / pH: 6.0  Gluc: NEGATIVE / Ketone: TRACE  / Bili: NEGATIVE / Urobili: SMALL   Blood: NEGATIVE / Protein: NEGATIVE / Nitrite: NEGATIVE   Leuk Esterase: NEGATIVE / RBC: x / WBC x   Sq Epi: x / Non Sq Epi: x / Bacteria: x    MICROBIOLOGY:     COVID neg   COVID PCR neg    RADIOLOGY:     MR:    IMPRESSION: Discitis/myelitis is seen at the L5-S1 level with some epidural and left paraspinal extension suspected. There is evidence of a collection seen in the ventral aspect of the spinal canal at the S1 level as described above.     CT:    IMPRESSION:    Multiple nodules involving the right lower lobe and both left lower and upper lobes, concerning for metastatic disease. If there are prior, outside CT exams of the chest they should be submitted for comparison.

## 2020-08-08 LAB
HCT VFR BLD CALC: 46 % — SIGNIFICANT CHANGE UP (ref 39–50)
HGB BLD-MCNC: 14.9 G/DL — SIGNIFICANT CHANGE UP (ref 13–17)
MCHC RBC-ENTMCNC: 30.8 PG — SIGNIFICANT CHANGE UP (ref 27–34)
MCHC RBC-ENTMCNC: 32.4 % — SIGNIFICANT CHANGE UP (ref 32–36)
MCV RBC AUTO: 95.2 FL — SIGNIFICANT CHANGE UP (ref 80–100)
NRBC # FLD: 0 K/UL — SIGNIFICANT CHANGE UP (ref 0–0)
PLATELET # BLD AUTO: 287 K/UL — SIGNIFICANT CHANGE UP (ref 150–400)
PMV BLD: 9.5 FL — SIGNIFICANT CHANGE UP (ref 7–13)
RBC # BLD: 4.83 M/UL — SIGNIFICANT CHANGE UP (ref 4.2–5.8)
RBC # FLD: 13.6 % — SIGNIFICANT CHANGE UP (ref 10.3–14.5)
WBC # BLD: 18.1 K/UL — HIGH (ref 3.8–10.5)
WBC # FLD AUTO: 18.1 K/UL — HIGH (ref 3.8–10.5)

## 2020-08-08 PROCEDURE — 99233 SBSQ HOSP IP/OBS HIGH 50: CPT | Mod: GC

## 2020-08-08 RX ADMIN — LIDOCAINE 1 PATCH: 4 CREAM TOPICAL at 17:36

## 2020-08-08 RX ADMIN — ZINC OXIDE 1 APPLICATION(S): 200 OINTMENT TOPICAL at 13:07

## 2020-08-08 RX ADMIN — LIDOCAINE 1 PATCH: 4 CREAM TOPICAL at 14:47

## 2020-08-08 RX ADMIN — LIDOCAINE 1 PATCH: 4 CREAM TOPICAL at 21:05

## 2020-08-08 RX ADMIN — FAMOTIDINE 20 MILLIGRAM(S): 10 INJECTION INTRAVENOUS at 06:22

## 2020-08-08 RX ADMIN — SODIUM CHLORIDE 3 MILLILITER(S): 9 INJECTION INTRAMUSCULAR; INTRAVENOUS; SUBCUTANEOUS at 06:23

## 2020-08-08 RX ADMIN — LIDOCAINE 1 PATCH: 4 CREAM TOPICAL at 01:48

## 2020-08-08 RX ADMIN — ZINC OXIDE 1 APPLICATION(S): 200 OINTMENT TOPICAL at 21:04

## 2020-08-08 RX ADMIN — ZINC OXIDE 1 APPLICATION(S): 200 OINTMENT TOPICAL at 06:21

## 2020-08-08 NOTE — PROGRESS NOTE ADULT - PROBLEM SELECTOR PLAN 2
L4/5 discitis w/ possible abscesses on MR  - Observe off abx and obtain Bcx x 2 per ID  - No current neurological deficits at present, will monitor on neuro checks q4h for now  - IR approved for spinal biopsy, will proceed after Bcx clearance, likely Monday  -no relief of lower back pain with Tylenol. Toradol q6 prn and lidocaine patch L4/5 discitis w/ possible abscesses on MR  - Observe off abx and obtain Bcx x 2 per ID  - No current neurological deficits at present, will monitor on neuro checks q4h for now  - IR approved for spinal biopsy, will proceed after Bcx clearance, likely Monday/Tuesday  -no relief of lower back pain with Tylenol. Toradol q6 prn and lidocaine patch with good effect. L4/5 discitis w/ possible abscesses on MR  - Observe off abx and obtain Bcx x 2 per ID  - No current neurological deficits at present, will monitor on neuro checks q4h for now  - IR approved for spinal biopsy, will proceed after Bcx clearance, likely Monday/Tuesday. Plan for 8/10, make NPO the night before. Hold DVT ppx.   -no relief of lower back pain with Tylenol. Toradol q6 prn and lidocaine patch with good effect.

## 2020-08-08 NOTE — PROGRESS NOTE ADULT - PROBLEM SELECTOR PLAN 3
Incidental finding of b/l lung nodule on CT A/P, confirmed on CT chest  - Pulm agreed on IR biopsy, and rec for TTE if suspicious for septic emboli (if bcx positive)  - IR approved for lung biopsy, will do with spinal biopsy after Bcx clearance Incidental finding of b/l lung nodule on CT A/P, confirmed on CT chest  - Pulm agreed on IR biopsy. Less concern for septic emboli given neg BCx  - IR approved for lung biopsy, will do with spinal biopsy

## 2020-08-08 NOTE — CHART NOTE - NSCHARTNOTEFT_GEN_A_CORE
GENERAL SURGERY POST-OP NOTE:     Status post: Biopsy, rectal; Rectal EUA    Subjective: Patient seen following his procedure. Patient asleep in bed, Pain is controlled.  Denies nausea/vomiting.  denies flatus/BM.  Not voiding, has not ambulated yet       Objective:    MEDICATIONS  (STANDING):  enoxaparin Injectable 40 milliGRAM(s) SubCutaneous daily  famotidine    Tablet 20 milliGRAM(s) Oral two times a day  lactated ringers. 1000 milliLiter(s) (50 mL/Hr) IV Continuous <Continuous>  lidocaine   Patch 1 Patch Transdermal daily  loratadine 10 milliGRAM(s) Oral daily  sodium chloride 0.9% lock flush 3 milliLiter(s) IV Push every 8 hours  zinc oxide 20% Ointment 1 Application(s) Topical every 8 hours    MEDICATIONS  (PRN):  acetaminophen   Tablet .. 650 milliGRAM(s) Oral every 6 hours PRN Temp greater or equal to 38C (100.4F), Mild Pain (1 - 3), Moderate Pain (4 - 6)  HYDROmorphone  Injectable 0.5 milliGRAM(s) IV Push every 10 minutes PRN Moderate Pain (4 - 6)  HYDROmorphone  Injectable 1 milliGRAM(s) IV Push every 10 minutes PRN Severe Pain (7 - 10)  ketorolac   Injectable 15 milliGRAM(s) IV Push every 6 hours PRN Moderate Pain (4 - 6)      Vital Signs Last 24 Hrs  T(C): 36.4 (07 Aug 2020 20:40), Max: 36.6 (07 Aug 2020 13:18)  T(F): 97.5 (07 Aug 2020 20:40), Max: 97.9 (07 Aug 2020 13:18)  HR: 71 (07 Aug 2020 21:00) (68 - 87)  BP: 129/76 (07 Aug 2020 21:00) (119/78 - 141/84)  BP(mean): 89 (07 Aug 2020 21:00) (88 - 95)  RR: 13 (07 Aug 2020 21:00) (13 - 20)  SpO2: 100% (07 Aug 2020 21:00) (95% - 100%)    I&O's Detail    06 Aug 2020 07:01  -  07 Aug 2020 07:00  --------------------------------------------------------  IN:  Total IN: 0 mL    OUT:    Voided: 200 mL  Total OUT: 200 mL    Total NET: -200 mL          Daily Height in cm: 177.8 (07 Aug 2020 17:15)    Daily     LABS:                        14.3   7.04  )-----------( 243      ( 07 Aug 2020 05:15 )             42.7     08-07    139  |  100  |  9   ----------------------------<  87  3.2<L>   |  23  |  0.79    Ca    9.2      07 Aug 2020 05:15  Phos  3.7     08-07  Mg     2.1     08-07      PT/INR - ( 07 Aug 2020 05:15 )   PT: 14.7 SEC;   INR: 1.29          PTT - ( 07 Aug 2020 05:15 )  PTT:32.8 SEC      PHYSICAL EXAM:  Gen: NAD, patient laying comfortably in bed  Resp: no increased work of breathing  Abd: Soft, non distended, non tender  Rectal: dressings c/d/i  Ext: warm and well perfused, grossly symmetric    A&P  75M with fistula, with additional concern for metastatic cancer of unknown primary origin, now s/p EUS and biopsy, recovering well on surgical floor.    PLAN:  -Pain control  -Diet  -OOB as tolerated  -DVT ppx    A Surgery  [id number]

## 2020-08-08 NOTE — PROGRESS NOTE ADULT - PROBLEM SELECTOR PLAN 5
DVT ppx - HSQ, hold for OR  Diet - CC + DASH diet, NPO at [facility] for surg procedure  Activity - Ambulate as tolerated  Dispo: Pending workup DVT - Lovenox 40 subq  Diet - CC + DASH diet  Activity - Ambulate as tolerated  Dispo: Pending workup

## 2020-08-08 NOTE — PROGRESS NOTE ADULT - ATTENDING COMMENTS
Patient seen and examined. Case discussed with the medical team on rounds. I agree with the findings and the plan above.    Undergoing malignancy workup   pending IR biopsy on Monday (spine and lung)  Continue the rest of the work up and management as stated above.

## 2020-08-08 NOTE — PROGRESS NOTE ADULT - ASSESSMENT
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 Also with pulmonary nodules of unclear etiology. S/p EUA w/ bx by surgery on 8/7. Pending lung/spine biospy w/ IR, and colonoscopy w/ GI. 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 Also with pulmonary nodules of unclear etiology. S/p EUA w/ bx by surgery on 8/7. Pending lung/spine biopsy w/ IR, and colonoscopy w/ GI.

## 2020-08-08 NOTE — PROGRESS NOTE ADULT - PROBLEM SELECTOR PLAN 1
Perianal fistula seen on CT A/P  -Pending EUA and possible drainage in OR w/ Sx, likely 8/7 at 4 PM  -NPO @ [facility] and hold A/C; Enema given prior to OR  - CEA elevated; GI consulted for colonoscopy for malignancy workup, pt prefers outpatient appt for colo. Presented with weeks of feculent drainage. 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 Dilaudid PRN for pain  - CEA elevated; GI consulted for colonoscopy for malignancy workup, pt amenable to inpatient colo Presented with weeks of feculent drainage. 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 Dilaudid PRN for pain  - CEA elevated; GI consulted for colonoscopy for malignancy workup, pt amenable to inpatient colo. GI aware, possible colo on Tues 8/11?

## 2020-08-08 NOTE — PROGRESS NOTE ADULT - SUBJECTIVE AND OBJECTIVE BOX
Internal Medicine Progress Note    =======================================================  CONTACT KENYATTA Wolf M.D., PGY-1  Pager:  [phone] ([facility])  =======================================================    Patient is a 75y old  Male who presents with a chief complaint of Perianal discharge and low back pain x4 weeks (07 Aug 2020 12:23)      SUBJECTIVE / OVERNIGHT EVENTS:    MEDICATIONS  (STANDING):  enoxaparin Injectable 40 milliGRAM(s) SubCutaneous daily  famotidine    Tablet 20 milliGRAM(s) Oral two times a day  lidocaine   Patch 1 Patch Transdermal daily  loratadine 10 milliGRAM(s) Oral daily  sodium chloride 0.9% lock flush 3 milliLiter(s) IV Push every 8 hours  zinc oxide 20% Ointment 1 Application(s) Topical every 8 hours    MEDICATIONS  (PRN):  acetaminophen   Tablet .. 650 milliGRAM(s) Oral every 6 hours PRN Temp greater or equal to 38C (100.4F), Mild Pain (1 - 3), Moderate Pain (4 - 6)  HYDROmorphone  Injectable 0.5 milliGRAM(s) IV Push every 10 minutes PRN Moderate Pain (4 - 6)  HYDROmorphone  Injectable 1 milliGRAM(s) IV Push every 10 minutes PRN Severe Pain (7 - 10)  ketorolac   Injectable 15 milliGRAM(s) IV Push every 6 hours PRN Moderate Pain (4 - 6)    Vital Signs Last 24 Hrs  T(C): 36.4 (08 Aug 2020 06:33), Max: 36.6 (07 Aug 2020 13:18)  T(F): 97.5 (08 Aug 2020 06:33), Max: 97.9 (07 Aug 2020 13:18)  HR: 64 (08 Aug 2020 06:33) (64 - 87)  BP: 108/68 (08 Aug 2020 06:33) (108/68 - 141/84)  BP(mean): 96 (07 Aug 2020 22:48) (88 - 96)  RR: 16 (08 Aug 2020 06:33) (13 - 20)  SpO2: 100% (08 Aug 2020 06:33) (95% - 100%)    CAPILLARY BLOOD GLUCOSE        I&O's Summary    06 Aug 2020 07:01  -  07 Aug 2020 07:00  --------------------------------------------------------  IN: 0 mL / OUT: 200 mL / NET: -200 mL        PHYSICAL EXAM  GENERAL: NAD  HEAD:  Atraumatic  EYES: EOMI, PERRLA, conjunctiva and sclera clear  NECK: Supple, No JVD  CHEST/LUNG: Clear to auscultation bilaterally; No wheeze  HEART: Regular rate and rhythm; No murmurs, rubs, or gallops  ABDOMEN: Soft, Nontender, Nondistended; Bowel sounds present  EXTREMITIES:  2+ Peripheral Pulses, No clubbing, cyanosis, or edema  NEURO/PSYCH: AAOx3, nonfocal  SKIN: No rashes or lesions      LABS:                        14.3   7.04  )-----------( 243      ( 07 Aug 2020 05:15 )             42.7     Hemoglobin: 14.3 g/dL (08-07 @ 05:15)  Hemoglobin: 15.6 g/dL (08-05 @ 06:55)  Hemoglobin: 16.8 g/dL (08-04 @ 12:50)    CBC Full  -  ( 07 Aug 2020 05:15 )  WBC Count : 7.04 K/uL  RBC Count : 4.47 M/uL  Hemoglobin : 14.3 g/dL  Hematocrit : 42.7 %  Platelet Count - Automated : 243 K/uL  Mean Cell Volume : 95.5 fL  Mean Cell Hemoglobin : 32.0 pg  Mean Cell Hemoglobin Concentration : 33.5 %  Auto Neutrophil # : x  Auto Lymphocyte # : x  Auto Monocyte # : x  Auto Eosinophil # : x  Auto Basophil # : x  Auto Neutrophil % : x  Auto Lymphocyte % : x  Auto Monocyte % : x  Auto Eosinophil % : x  Auto Basophil % : x    08-07    139  |  100  |  9   ----------------------------<  87  3.2<L>   |  23  |  0.79    Ca    9.2      07 Aug 2020 05:15  Phos  3.7     08-07  Mg     2.1     08-07      Creatinine Trend: 0.79<--, 0.77<--, 0.81<--, 0.93<--    PT/INR - ( 07 Aug 2020 05:15 )   PT: 14.7 SEC;   INR: 1.29          PTT - ( 07 Aug 2020 05:15 )  PTT:32.8 SEC    hs Troponin:              CSF:                      EKG:   MICROBIOLOGY:    Culture - Blood (collected 06 Aug 2020 14:46)  Source: .Blood Blood-Venous  Preliminary Report (07 Aug 2020 15:02):    No growth to date.    Culture - Blood (collected 06 Aug 2020 14:46)  Source: .Blood Blood-Peripheral  Preliminary Report (07 Aug 2020 15:02):    No growth to date.      IMAGING:      Labs, imaging, EKG personally reviewed Internal Medicine Progress Note    =======================================================  CONTACT KENYATTA Wolf M.D., PGY-1  Pager:  [phone] ([facility])  =======================================================    Patient is a 75y old  Male who presents with a chief complaint of Perianal discharge and low back pain x4 weeks (07 Aug 2020 12:23)      SUBJECTIVE / OVERNIGHT EVENTS:  Pt was taken to the OR and had a EUA w/ bx performed.    MEDICATIONS  (STANDING):  enoxaparin Injectable 40 milliGRAM(s) SubCutaneous daily  famotidine    Tablet 20 milliGRAM(s) Oral two times a day  lidocaine   Patch 1 Patch Transdermal daily  loratadine 10 milliGRAM(s) Oral daily  sodium chloride 0.9% lock flush 3 milliLiter(s) IV Push every 8 hours  zinc oxide 20% Ointment 1 Application(s) Topical every 8 hours    MEDICATIONS  (PRN):  acetaminophen   Tablet .. 650 milliGRAM(s) Oral every 6 hours PRN Temp greater or equal to 38C (100.4F), Mild Pain (1 - 3), Moderate Pain (4 - 6)  HYDROmorphone  Injectable 0.5 milliGRAM(s) IV Push every 10 minutes PRN Moderate Pain (4 - 6)  HYDROmorphone  Injectable 1 milliGRAM(s) IV Push every 10 minutes PRN Severe Pain (7 - 10)  ketorolac   Injectable 15 milliGRAM(s) IV Push every 6 hours PRN Moderate Pain (4 - 6)    Vital Signs Last 24 Hrs  T(C): 36.4 (08 Aug 2020 06:33), Max: 36.6 (07 Aug 2020 13:18)  T(F): 97.5 (08 Aug 2020 06:33), Max: 97.9 (07 Aug 2020 13:18)  HR: 64 (08 Aug 2020 06:33) (64 - 87)  BP: 108/68 (08 Aug 2020 06:33) (108/68 - 141/84)  BP(mean): 96 (07 Aug 2020 22:48) (88 - 96)  RR: 16 (08 Aug 2020 06:33) (13 - 20)  SpO2: 100% (08 Aug 2020 06:33) (95% - 100%)    CAPILLARY BLOOD GLUCOSE        I&O's Summary    06 Aug 2020 07:01  -  07 Aug 2020 07:00  --------------------------------------------------------  IN: 0 mL / OUT: 200 mL / NET: -200 mL        PHYSICAL EXAM  GENERAL: NAD  HEAD:  Atraumatic  EYES: EOMI, PERRLA, conjunctiva and sclera clear  NECK: Supple, No JVD  CHEST/LUNG: Clear to auscultation bilaterally; No wheeze  HEART: Regular rate and rhythm; No murmurs, rubs, or gallops  ABDOMEN: Soft, Nontender, Nondistended; Bowel sounds present  EXTREMITIES:  2+ Peripheral Pulses, No clubbing, cyanosis, or edema  NEURO/PSYCH: AAOx3, nonfocal  SKIN: No rashes or lesions      LABS:                        14.3   7.04  )-----------( 243      ( 07 Aug 2020 05:15 )             42.7     Hemoglobin: 14.3 g/dL (08-07 @ 05:15)  Hemoglobin: 15.6 g/dL (08-05 @ 06:55)  Hemoglobin: 16.8 g/dL (08-04 @ 12:50)    CBC Full  -  ( 07 Aug 2020 05:15 )  WBC Count : 7.04 K/uL  RBC Count : 4.47 M/uL  Hemoglobin : 14.3 g/dL  Hematocrit : 42.7 %  Platelet Count - Automated : 243 K/uL  Mean Cell Volume : 95.5 fL  Mean Cell Hemoglobin : 32.0 pg  Mean Cell Hemoglobin Concentration : 33.5 %  Auto Neutrophil # : x  Auto Lymphocyte # : x  Auto Monocyte # : x  Auto Eosinophil # : x  Auto Basophil # : x  Auto Neutrophil % : x  Auto Lymphocyte % : x  Auto Monocyte % : x  Auto Eosinophil % : x  Auto Basophil % : x    08-07    139  |  100  |  9   ----------------------------<  87  3.2<L>   |  23  |  0.79    Ca    9.2      07 Aug 2020 05:15  Phos  3.7     08-07  Mg     2.1     08-07      Creatinine Trend: 0.79<--, 0.77<--, 0.81<--, 0.93<--    PT/INR - ( 07 Aug 2020 05:15 )   PT: 14.7 SEC;   INR: 1.29          PTT - ( 07 Aug 2020 05:15 )  PTT:32.8 SEC    hs Troponin:              CSF:                      EKG:   MICROBIOLOGY:    Culture - Blood (collected 06 Aug 2020 14:46)  Source: .Blood Blood-Venous  Preliminary Report (07 Aug 2020 15:02):    No growth to date.    Culture - Blood (collected 06 Aug 2020 14:46)  Source: .Blood Blood-Peripheral  Preliminary Report (07 Aug 2020 15:02):    No growth to date.      IMAGING:      Labs, imaging, EKG personally reviewed Internal Medicine Progress Note    =======================================================  CONTACT KENYATTA Wolf M.D., PGY-1  Pager:  [phone] ([facility])  =======================================================    Patient is a 75y old  Male who presents with a chief complaint of Perianal discharge and low back pain x4 weeks (07 Aug 2020 12:23)      SUBJECTIVE / OVERNIGHT EVENTS:  Pt was taken to the OR and had a EUA w/ bx performed.     This am, pt denies any major complaints. Has not passed gas yet. Has been urinating. States his lower back pain has improved. Amenable to having a colonoscopy done while inpatient.     MEDICATIONS  (STANDING):  enoxaparin Injectable 40 milliGRAM(s) SubCutaneous daily  famotidine    Tablet 20 milliGRAM(s) Oral two times a day  lidocaine   Patch 1 Patch Transdermal daily  loratadine 10 milliGRAM(s) Oral daily  sodium chloride 0.9% lock flush 3 milliLiter(s) IV Push every 8 hours  zinc oxide 20% Ointment 1 Application(s) Topical every 8 hours    MEDICATIONS  (PRN):  acetaminophen   Tablet .. 650 milliGRAM(s) Oral every 6 hours PRN Temp greater or equal to 38C (100.4F), Mild Pain (1 - 3), Moderate Pain (4 - 6)  HYDROmorphone  Injectable 0.5 milliGRAM(s) IV Push every 10 minutes PRN Moderate Pain (4 - 6)  HYDROmorphone  Injectable 1 milliGRAM(s) IV Push every 10 minutes PRN Severe Pain (7 - 10)  ketorolac   Injectable 15 milliGRAM(s) IV Push every 6 hours PRN Moderate Pain (4 - 6)    Vital Signs Last 24 Hrs  T(C): 36.4 (08 Aug 2020 06:33), Max: 36.6 (07 Aug 2020 13:18)  T(F): 97.5 (08 Aug 2020 06:33), Max: 97.9 (07 Aug 2020 13:18)  HR: 64 (08 Aug 2020 06:33) (64 - 87)  BP: 108/68 (08 Aug 2020 06:33) (108/68 - 141/84)  BP(mean): 96 (07 Aug 2020 22:48) (88 - 96)  RR: 16 (08 Aug 2020 06:33) (13 - 20)  SpO2: 100% (08 Aug 2020 06:33) (95% - 100%)    CAPILLARY BLOOD GLUCOSE        I&O's Summary    06 Aug 2020 07:01  -  07 Aug 2020 07:00  --------------------------------------------------------  IN: 0 mL / OUT: 200 mL / NET: -200 mL        PHYSICAL EXAM  GENERAL: NAD  HEAD:  Atraumatic  EYES: EOMI, PERRLA, conjunctiva and sclera clear  NECK: Supple, No JVD  CHEST/LUNG: Clear to auscultation bilaterally; No wheeze  HEART: Regular rate and rhythm; No murmurs, rubs, or gallops  ABDOMEN: Bowel sounds present. Soft, Nontender, Nondistended  EXTREMITIES:  2+ Peripheral Pulses, No clubbing, cyanosis, or edema  NEURO/PSYCH: AAOx3, nonfocal  SKIN: No rashes or lesions      LABS:                        14.3   7.04  )-----------( 243      ( 07 Aug 2020 05:15 )             42.7     Hemoglobin: 14.3 g/dL (08-07 @ 05:15)  Hemoglobin: 15.6 g/dL (08-05 @ 06:55)  Hemoglobin: 16.8 g/dL (08-04 @ 12:50)    CBC Full  -  ( 07 Aug 2020 05:15 )  WBC Count : 7.04 K/uL  RBC Count : 4.47 M/uL  Hemoglobin : 14.3 g/dL  Hematocrit : 42.7 %  Platelet Count - Automated : 243 K/uL  Mean Cell Volume : 95.5 fL  Mean Cell Hemoglobin : 32.0 pg  Mean Cell Hemoglobin Concentration : 33.5 %  Auto Neutrophil # : x  Auto Lymphocyte # : x  Auto Monocyte # : x  Auto Eosinophil # : x  Auto Basophil # : x  Auto Neutrophil % : x  Auto Lymphocyte % : x  Auto Monocyte % : x  Auto Eosinophil % : x  Auto Basophil % : x    08-07    139  |  100  |  9   ----------------------------<  87  3.2<L>   |  23  |  0.79    Ca    9.2      07 Aug 2020 05:15  Phos  3.7     08-07  Mg     2.1     08-07      Creatinine Trend: 0.79<--, 0.77<--, 0.81<--, 0.93<--    PT/INR - ( 07 Aug 2020 05:15 )   PT: 14.7 SEC;   INR: 1.29          PTT - ( 07 Aug 2020 05:15 )  PTT:32.8 SEC    hs Troponin:              CSF:                      EKG:   MICROBIOLOGY:    Culture - Blood (collected 06 Aug 2020 14:46)  Source: .Blood Blood-Venous  Preliminary Report (07 Aug 2020 15:02):    No growth to date.    Culture - Blood (collected 06 Aug 2020 14:46)  Source: .Blood Blood-Peripheral  Preliminary Report (07 Aug 2020 15:02):    No growth to date.      IMAGING:      Labs, imaging, EKG personally reviewed

## 2020-08-09 LAB
ANION GAP SERPL CALC-SCNC: 12 MMO/L — SIGNIFICANT CHANGE UP (ref 7–14)
BUN SERPL-MCNC: 11 MG/DL — SIGNIFICANT CHANGE UP (ref 7–23)
CALCIUM SERPL-MCNC: 8.5 MG/DL — SIGNIFICANT CHANGE UP (ref 8.4–10.5)
CHLORIDE SERPL-SCNC: 105 MMOL/L — SIGNIFICANT CHANGE UP (ref 98–107)
CO2 SERPL-SCNC: 25 MMOL/L — SIGNIFICANT CHANGE UP (ref 22–31)
CREAT SERPL-MCNC: 0.84 MG/DL — SIGNIFICANT CHANGE UP (ref 0.5–1.3)
GLUCOSE SERPL-MCNC: 115 MG/DL — HIGH (ref 70–99)
HCT VFR BLD CALC: 42.3 % — SIGNIFICANT CHANGE UP (ref 39–50)
HGB BLD-MCNC: 13.6 G/DL — SIGNIFICANT CHANGE UP (ref 13–17)
MAGNESIUM SERPL-MCNC: 2 MG/DL — SIGNIFICANT CHANGE UP (ref 1.6–2.6)
MCHC RBC-ENTMCNC: 30.8 PG — SIGNIFICANT CHANGE UP (ref 27–34)
MCHC RBC-ENTMCNC: 32.2 % — SIGNIFICANT CHANGE UP (ref 32–36)
MCV RBC AUTO: 95.7 FL — SIGNIFICANT CHANGE UP (ref 80–100)
NRBC # FLD: 0 K/UL — SIGNIFICANT CHANGE UP (ref 0–0)
PHOSPHATE SERPL-MCNC: 3.5 MG/DL — SIGNIFICANT CHANGE UP (ref 2.5–4.5)
PLATELET # BLD AUTO: 223 K/UL — SIGNIFICANT CHANGE UP (ref 150–400)
PMV BLD: 9.6 FL — SIGNIFICANT CHANGE UP (ref 7–13)
POTASSIUM SERPL-MCNC: 4.1 MMOL/L — SIGNIFICANT CHANGE UP (ref 3.5–5.3)
POTASSIUM SERPL-SCNC: 4.1 MMOL/L — SIGNIFICANT CHANGE UP (ref 3.5–5.3)
RBC # BLD: 4.42 M/UL — SIGNIFICANT CHANGE UP (ref 4.2–5.8)
RBC # FLD: 14 % — SIGNIFICANT CHANGE UP (ref 10.3–14.5)
SODIUM SERPL-SCNC: 142 MMOL/L — SIGNIFICANT CHANGE UP (ref 135–145)
WBC # BLD: 8.76 K/UL — SIGNIFICANT CHANGE UP (ref 3.8–10.5)
WBC # FLD AUTO: 8.76 K/UL — SIGNIFICANT CHANGE UP (ref 3.8–10.5)

## 2020-08-09 PROCEDURE — 99232 SBSQ HOSP IP/OBS MODERATE 35: CPT | Mod: GC

## 2020-08-09 RX ORDER — OXYCODONE HYDROCHLORIDE 5 MG/1
5 TABLET ORAL ONCE
Refills: 0 | Status: DISCONTINUED | OUTPATIENT
Start: 2020-08-09 | End: 2020-08-09

## 2020-08-09 RX ORDER — ENOXAPARIN SODIUM 100 MG/ML
40 INJECTION SUBCUTANEOUS ONCE
Refills: 0 | Status: COMPLETED | OUTPATIENT
Start: 2020-08-09 | End: 2020-08-09

## 2020-08-09 RX ORDER — OXYCODONE HYDROCHLORIDE 5 MG/1
5 TABLET ORAL DAILY
Refills: 0 | Status: DISCONTINUED | OUTPATIENT
Start: 2020-08-09 | End: 2020-08-10

## 2020-08-09 RX ADMIN — OXYCODONE HYDROCHLORIDE 5 MILLIGRAM(S): 5 TABLET ORAL at 22:25

## 2020-08-09 RX ADMIN — LIDOCAINE 1 PATCH: 4 CREAM TOPICAL at 17:01

## 2020-08-09 RX ADMIN — ZINC OXIDE 1 APPLICATION(S): 200 OINTMENT TOPICAL at 21:31

## 2020-08-09 RX ADMIN — ZINC OXIDE 1 APPLICATION(S): 200 OINTMENT TOPICAL at 06:31

## 2020-08-09 RX ADMIN — ZINC OXIDE 1 APPLICATION(S): 200 OINTMENT TOPICAL at 12:29

## 2020-08-09 RX ADMIN — OXYCODONE HYDROCHLORIDE 5 MILLIGRAM(S): 5 TABLET ORAL at 13:00

## 2020-08-09 RX ADMIN — Medication 650 MILLIGRAM(S): at 21:35

## 2020-08-09 RX ADMIN — Medication 650 MILLIGRAM(S): at 22:25

## 2020-08-09 RX ADMIN — LIDOCAINE 1 PATCH: 4 CREAM TOPICAL at 12:28

## 2020-08-09 RX ADMIN — OXYCODONE HYDROCHLORIDE 5 MILLIGRAM(S): 5 TABLET ORAL at 21:35

## 2020-08-09 RX ADMIN — LIDOCAINE 1 PATCH: 4 CREAM TOPICAL at 21:36

## 2020-08-09 RX ADMIN — OXYCODONE HYDROCHLORIDE 5 MILLIGRAM(S): 5 TABLET ORAL at 12:28

## 2020-08-09 NOTE — PROGRESS NOTE ADULT - ATTENDING COMMENTS
Patient seen and examined. Case discussed with the medical team on rounds. I agree with the findings and the plan above.    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  Denies fever, +weight loss. No prior colonoscopy    IR biopsy on Monday for lumbar and lung lesions  Hopefully with colonoscopy on Tuesday?  Continue the rest of the work up and management as stated above.

## 2020-08-09 NOTE — PROGRESS NOTE ADULT - PROBLEM SELECTOR PLAN 5
DVT - Lovenox 40 subq  Diet - CC + DASH diet  Activity - Ambulate as tolerated  Dispo: Pending workup

## 2020-08-09 NOTE — PROGRESS NOTE ADULT - ASSESSMENT
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 Also with pulmonary nodules of unclear etiology. S/p EUA w/ bx by surgery on 8/7. Pending lung/spine biopsy w/ IR, and colonoscopy w/ GI.

## 2020-08-09 NOTE — PROGRESS NOTE ADULT - ASSESSMENT
Patient is a 50 year old male with PMH of basal cell cancer of the nose s/p resection admitted for management of perianal fistulas which on CT imaging showed complex perianal fistula, osteomyelitis and pulmonary nodules and was admitted for malignancy workup to medicine service. Patient was seen today and found to be NAD, stable and having adequate pain management with oral pain medications Patient is recovering well and recommend continued management of pain symptoms. Will continue to follow patient.     Plan:  - Diet as tolerated  - Pain management  - OOB  - DVT Ppx  - Electrolyte repletion as tolerated

## 2020-08-09 NOTE — PROGRESS NOTE ADULT - PROBLEM SELECTOR PLAN 1
Presented with weeks of feculent drainage. 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 Dilaudid PRN for pain  - CEA elevated; GI consulted for colonoscopy for malignancy workup, pt amenable to inpatient colo. GI aware, possible colo on Tues 8/11?

## 2020-08-09 NOTE — PROGRESS NOTE ADULT - SUBJECTIVE AND OBJECTIVE BOX
GENERAL SURGERY PROGRESS NOTE TEAM A  ----------------------------------------------------------------------------------    ZARINA GLOVER  |  [id number]   |   75y  |    Male   |   [facility] [id number]   |    Admit:  08-04-20 (5d)    Attending: Soledad Ray      Patient is a 75y old  Male who presents with a chief complaint of Perianal discharge and low back pain x4 weeks (09 Aug 2020 06:47)      ----------------------------------------------------------------------------------    SUBJECTIVE:   Patient seen today during morning rounds at bedside and without acute distress. Patient tolerating diet and reports no nausea or vomiting with adequate pain control with current regimen.   24 hour events: None  Overnight: None    OBJECTIVE:  MEDICATIONS  (STANDING):  enoxaparin Injectable 40 milliGRAM(s) SubCutaneous once  famotidine    Tablet 20 milliGRAM(s) Oral two times a day  lidocaine   Patch 1 Patch Transdermal daily  loratadine 10 milliGRAM(s) Oral daily  zinc oxide 20% Ointment 1 Application(s) Topical every 8 hours    MEDICATIONS  (PRN):  acetaminophen   Tablet .. 650 milliGRAM(s) Oral every 6 hours PRN Temp greater or equal to 38C (100.4F), Mild Pain (1 - 3), Moderate Pain (4 - 6)  ketorolac   Injectable 15 milliGRAM(s) IV Push every 6 hours PRN Moderate Pain (4 - 6)  oxyCODONE    IR 5 milliGRAM(s) Oral once PRN Severe Pain (7 - 10)      Allergies    dust (Rhinitis; Headache)  No Known Drug Allergies    Intolerances        PMH:   H/O: Obesity  Basal Cell Cancer; Right side distal nose  Seasonal Allergies  H/O skin graft  Basal Cell Carcinoma of Anterior Chest; s/p MOHS surgery 8/10 proximal to suprasternal notch      Vitals:  Vital Signs Last 24 Hrs  T(C): 36.6 (09 Aug 2020 06:32), Max: 36.6 (09 Aug 2020 06:32)  T(F): 97.9 (09 Aug 2020 06:32), Max: 97.9 (09 Aug 2020 06:32)  HR: 64 (09 Aug 2020 06:32) (64 - 83)  BP: 113/76 (09 Aug 2020 06:32) (113/76 - 130/77)  BP(mean): --  RR: 18 (09 Aug 2020 06:32) (16 - 18)  SpO2: 96% (09 Aug 2020 06:32) (95% - 96%)      Physical Exam:  Constitutional: resting in bed with no acute distress  Neuro: AAOx3  Respiratory:  unlabored breathing  Gastrointestinal: Abdomen soft, non distended, non tenderness  Extremities:  No edema, no calf tenderness  Skin: Non-jaundiced, no cyanosis or rash observed    LABS:  ----------  LABORATORY DATA:  ----------                        13.6   8.76  )-----------( 223      ( 09 Aug 2020 06:40 )             42.3               08-09    142  |  105  |  11  ----------------------------<  115<H>  4.1   |  25  |  0.84    Ca    8.5      09 Aug 2020 06:40  Phos  3.5     08-09  Mg     2.0     08-09

## 2020-08-09 NOTE — PROGRESS NOTE ADULT - SUBJECTIVE AND OBJECTIVE BOX
Internal Medicine Progress Note    =======================================================  CONTACT KENYATTA Wolf M.D., PGY-1  Pager:  [phone] ([facility])  =======================================================    Patient is a 75y old  Male who presents with a chief complaint of Perianal discharge and low back pain x4 weeks (08 Aug 2020 06:49)      SUBJECTIVE / OVERNIGHT EVENTS:    MEDICATIONS  (STANDING):  enoxaparin Injectable 40 milliGRAM(s) SubCutaneous daily  famotidine    Tablet 20 milliGRAM(s) Oral two times a day  lidocaine   Patch 1 Patch Transdermal daily  loratadine 10 milliGRAM(s) Oral daily  zinc oxide 20% Ointment 1 Application(s) Topical every 8 hours    MEDICATIONS  (PRN):  acetaminophen   Tablet .. 650 milliGRAM(s) Oral every 6 hours PRN Temp greater or equal to 38C (100.4F), Mild Pain (1 - 3), Moderate Pain (4 - 6)  ketorolac   Injectable 15 milliGRAM(s) IV Push every 6 hours PRN Moderate Pain (4 - 6)    Vital Signs Last 24 Hrs  T(C): 36.6 (09 Aug 2020 06:32), Max: 36.6 (09 Aug 2020 06:32)  T(F): 97.9 (09 Aug 2020 06:32), Max: 97.9 (09 Aug 2020 06:32)  HR: 64 (09 Aug 2020 06:32) (64 - 83)  BP: 113/76 (09 Aug 2020 06:32) (113/76 - 130/77)  BP(mean): --  RR: 18 (09 Aug 2020 06:32) (16 - 18)  SpO2: 96% (09 Aug 2020 06:32) (95% - 96%)    CAPILLARY BLOOD GLUCOSE        I&O's Summary      PHYSICAL EXAM  GENERAL: NAD  HEAD:  Atraumatic  EYES: EOMI, PERRLA, conjunctiva and sclera clear  NECK: Supple, No JVD  CHEST/LUNG: Clear to auscultation bilaterally; No wheeze  HEART: Regular rate and rhythm; No murmurs, rubs, or gallops  ABDOMEN: Soft, Nontender, Nondistended; Bowel sounds present  EXTREMITIES:  2+ Peripheral Pulses, No clubbing, cyanosis, or edema  NEURO/PSYCH: AAOx3, nonfocal  SKIN: No rashes or lesions      LABS:                        14.9   18.10 )-----------( 287      ( 08 Aug 2020 10:18 )             46.0     Hemoglobin: 14.9 g/dL (08-08 @ 10:18)  Hemoglobin: 14.3 g/dL (08-07 @ 05:15)  Hemoglobin: 15.6 g/dL (08-05 @ 06:55)  Hemoglobin: 16.8 g/dL (08-04 @ 12:50)    CBC Full  -  ( 08 Aug 2020 10:18 )  WBC Count : 18.10 K/uL  RBC Count : 4.83 M/uL  Hemoglobin : 14.9 g/dL  Hematocrit : 46.0 %  Platelet Count - Automated : 287 K/uL  Mean Cell Volume : 95.2 fL  Mean Cell Hemoglobin : 30.8 pg  Mean Cell Hemoglobin Concentration : 32.4 %  Auto Neutrophil # : x  Auto Lymphocyte # : x  Auto Monocyte # : x  Auto Eosinophil # : x  Auto Basophil # : x  Auto Neutrophil % : x  Auto Lymphocyte % : x  Auto Monocyte % : x  Auto Eosinophil % : x  Auto Basophil % : x          Creatinine Trend: 0.79<--, 0.77<--, 0.81<--, 0.93<--        hs Troponin:              CSF:                      EKG:   MICROBIOLOGY:    Culture - Blood (collected 06 Aug 2020 14:46)  Source: .Blood Blood-Venous  Preliminary Report (07 Aug 2020 15:02):    No growth to date.    Culture - Blood (collected 06 Aug 2020 14:46)  Source: .Blood Blood-Peripheral  Preliminary Report (07 Aug 2020 15:02):    No growth to date.      IMAGING:      Labs, imaging, EKG personally reviewed Internal Medicine Progress Note    =======================================================  CONTACT KENYATTA Wolf M.D., PGY-1  Pager:  [phone] ([facility])  =======================================================    Patient is a 75y old  Male who presents with a chief complaint of Perianal discharge and low back pain x4 weeks (08 Aug 2020 06:49)      SUBJECTIVE / OVERNIGHT EVENTS:  NAEO.     This am, pt denied any major complaints, other than continued rectal pain s/p surgery.     MEDICATIONS  (STANDING):  enoxaparin Injectable 40 milliGRAM(s) SubCutaneous daily  famotidine    Tablet 20 milliGRAM(s) Oral two times a day  lidocaine   Patch 1 Patch Transdermal daily  loratadine 10 milliGRAM(s) Oral daily  zinc oxide 20% Ointment 1 Application(s) Topical every 8 hours    MEDICATIONS  (PRN):  acetaminophen   Tablet .. 650 milliGRAM(s) Oral every 6 hours PRN Temp greater or equal to 38C (100.4F), Mild Pain (1 - 3), Moderate Pain (4 - 6)  ketorolac   Injectable 15 milliGRAM(s) IV Push every 6 hours PRN Moderate Pain (4 - 6)    Vital Signs Last 24 Hrs  T(C): 36.6 (09 Aug 2020 06:32), Max: 36.6 (09 Aug 2020 06:32)  T(F): 97.9 (09 Aug 2020 06:32), Max: 97.9 (09 Aug 2020 06:32)  HR: 64 (09 Aug 2020 06:32) (64 - 83)  BP: 113/76 (09 Aug 2020 06:32) (113/76 - 130/77)  BP(mean): --  RR: 18 (09 Aug 2020 06:32) (16 - 18)  SpO2: 96% (09 Aug 2020 06:32) (95% - 96%)    CAPILLARY BLOOD GLUCOSE        I&O's Summary      PHYSICAL EXAM  GENERAL: NAD  HEAD:  Atraumatic  EYES: EOMI, PERRLA, conjunctiva and sclera clear  NECK: Supple, No JVD  CHEST/LUNG: Clear to auscultation bilaterally; No wheeze  HEART: Regular rate and rhythm; No murmurs, rubs, or gallops  ABDOMEN: Soft, Nontender, Nondistended; Bowel sounds present  EXTREMITIES:  2+ Peripheral Pulses, No clubbing, cyanosis, or edema  NEURO/PSYCH: AAOx3, nonfocal  SKIN: No rashes or lesions      LABS:                        14.9   18.10 )-----------( 287      ( 08 Aug 2020 10:18 )             46.0     Hemoglobin: 14.9 g/dL (08-08 @ 10:18)  Hemoglobin: 14.3 g/dL (08-07 @ 05:15)  Hemoglobin: 15.6 g/dL (08-05 @ 06:55)  Hemoglobin: 16.8 g/dL (08-04 @ 12:50)    CBC Full  -  ( 08 Aug 2020 10:18 )  WBC Count : 18.10 K/uL  RBC Count : 4.83 M/uL  Hemoglobin : 14.9 g/dL  Hematocrit : 46.0 %  Platelet Count - Automated : 287 K/uL  Mean Cell Volume : 95.2 fL  Mean Cell Hemoglobin : 30.8 pg  Mean Cell Hemoglobin Concentration : 32.4 %  Auto Neutrophil # : x  Auto Lymphocyte # : x  Auto Monocyte # : x  Auto Eosinophil # : x  Auto Basophil # : x  Auto Neutrophil % : x  Auto Lymphocyte % : x  Auto Monocyte % : x  Auto Eosinophil % : x  Auto Basophil % : x          Creatinine Trend: 0.79<--, 0.77<--, 0.81<--, 0.93<--        hs Troponin:              CSF:                      EKG:   MICROBIOLOGY:    Culture - Blood (collected 06 Aug 2020 14:46)  Source: .Blood Blood-Venous  Preliminary Report (07 Aug 2020 15:02):    No growth to date.    Culture - Blood (collected 06 Aug 2020 14:46)  Source: .Blood Blood-Peripheral  Preliminary Report (07 Aug 2020 15:02):    No growth to date.      IMAGING:      Labs, imaging, EKG personally reviewed

## 2020-08-09 NOTE — PROGRESS NOTE ADULT - PROBLEM SELECTOR PLAN 2
L4/5 discitis w/ possible abscesses on MR  - Observe off abx and obtain Bcx x 2 per ID  - No current neurological deficits at present, will monitor on neuro checks q4h for now  - IR approved for spinal biopsy, will proceed after Bcx clearance, likely Monday/Tuesday. Plan for 8/10, make NPO the night before. Hold DVT ppx.   -no relief of lower back pain with Tylenol. Toradol q6 prn and lidocaine patch with good effect.

## 2020-08-09 NOTE — CHART NOTE - NSCHARTNOTEFT_GEN_A_CORE
PRE-INTERVENTIONAL RADIOLOGY PROCEDURE NOTE      Patient Age: 75    Patient Gender: Male    Procedure: Biopsy of lung nodule and spine    Diagnosis/Indication: lung nodule and spine abscess seen on imaging    Interventional Radiology Attending Physician: Dr. Echols    Ordering Attending Physician: Dr. Ray    Pertinent Medical History:   Remote hx of basal cell carcinoma s/p surgical resection. Admitted for perirectal drainage, fistula found on CT, s/p EUA w/ bx, pending results.       Pertinent labs:                      13.6   8.76  )-----------( 223      ( 09 Aug 2020 06:40 )             42.3       08-09    142  |  105  |  11  ----------------------------<  115<H>  4.1   |  25  |  0.84    Ca    8.5      09 Aug 2020 06:40  Phos  3.5     08-09  Mg     2.0     08-09                Patient and Family Aware ? Yes

## 2020-08-10 ENCOUNTER — RESULT REVIEW (OUTPATIENT)
Age: 75
End: 2020-08-10

## 2020-08-10 LAB
ANION GAP SERPL CALC-SCNC: 11 MMO/L — SIGNIFICANT CHANGE UP (ref 7–14)
BLD GP AB SCN SERPL QL: NEGATIVE — SIGNIFICANT CHANGE UP
BUN SERPL-MCNC: 10 MG/DL — SIGNIFICANT CHANGE UP (ref 7–23)
CALCIUM SERPL-MCNC: 9.1 MG/DL — SIGNIFICANT CHANGE UP (ref 8.4–10.5)
CHLORIDE SERPL-SCNC: 102 MMOL/L — SIGNIFICANT CHANGE UP (ref 98–107)
CO2 SERPL-SCNC: 26 MMOL/L — SIGNIFICANT CHANGE UP (ref 22–31)
CREAT SERPL-MCNC: 0.74 MG/DL — SIGNIFICANT CHANGE UP (ref 0.5–1.3)
GLUCOSE SERPL-MCNC: 95 MG/DL — SIGNIFICANT CHANGE UP (ref 70–99)
HCT VFR BLD CALC: 43.4 % — SIGNIFICANT CHANGE UP (ref 39–50)
HGB BLD-MCNC: 14.1 G/DL — SIGNIFICANT CHANGE UP (ref 13–17)
MAGNESIUM SERPL-MCNC: 2 MG/DL — SIGNIFICANT CHANGE UP (ref 1.6–2.6)
MCHC RBC-ENTMCNC: 31.8 PG — SIGNIFICANT CHANGE UP (ref 27–34)
MCHC RBC-ENTMCNC: 32.5 % — SIGNIFICANT CHANGE UP (ref 32–36)
MCV RBC AUTO: 97.7 FL — SIGNIFICANT CHANGE UP (ref 80–100)
NRBC # FLD: 0 K/UL — SIGNIFICANT CHANGE UP (ref 0–0)
PHOSPHATE SERPL-MCNC: 3.6 MG/DL — SIGNIFICANT CHANGE UP (ref 2.5–4.5)
PLATELET # BLD AUTO: 220 K/UL — SIGNIFICANT CHANGE UP (ref 150–400)
PMV BLD: 9.9 FL — SIGNIFICANT CHANGE UP (ref 7–13)
POTASSIUM SERPL-MCNC: 4.5 MMOL/L — SIGNIFICANT CHANGE UP (ref 3.5–5.3)
POTASSIUM SERPL-SCNC: 4.5 MMOL/L — SIGNIFICANT CHANGE UP (ref 3.5–5.3)
RBC # BLD: 4.44 M/UL — SIGNIFICANT CHANGE UP (ref 4.2–5.8)
RBC # FLD: 14.2 % — SIGNIFICANT CHANGE UP (ref 10.3–14.5)
RH IG SCN BLD-IMP: NEGATIVE — SIGNIFICANT CHANGE UP
SODIUM SERPL-SCNC: 139 MMOL/L — SIGNIFICANT CHANGE UP (ref 135–145)
WBC # BLD: 11.71 K/UL — HIGH (ref 3.8–10.5)
WBC # FLD AUTO: 11.71 K/UL — HIGH (ref 3.8–10.5)

## 2020-08-10 PROCEDURE — 99232 SBSQ HOSP IP/OBS MODERATE 35: CPT | Mod: GC

## 2020-08-10 PROCEDURE — 88305 TISSUE EXAM BY PATHOLOGIST: CPT | Mod: 26

## 2020-08-10 PROCEDURE — 99233 SBSQ HOSP IP/OBS HIGH 50: CPT | Mod: GC

## 2020-08-10 PROCEDURE — 88173 CYTOPATH EVAL FNA REPORT: CPT | Mod: 26

## 2020-08-10 PROCEDURE — 88342 IMHCHEM/IMCYTCHM 1ST ANTB: CPT | Mod: 26

## 2020-08-10 PROCEDURE — 32405: CPT

## 2020-08-10 PROCEDURE — 88341 IMHCHEM/IMCYTCHM EA ADD ANTB: CPT | Mod: 26

## 2020-08-10 PROCEDURE — 62267 INTERDISCAL PERQ ASPIR DX: CPT

## 2020-08-10 PROCEDURE — 99232 SBSQ HOSP IP/OBS MODERATE 35: CPT

## 2020-08-10 PROCEDURE — 77012 CT SCAN FOR NEEDLE BIOPSY: CPT | Mod: 26

## 2020-08-10 PROCEDURE — 71045 X-RAY EXAM CHEST 1 VIEW: CPT | Mod: 26

## 2020-08-10 RX ORDER — ENOXAPARIN SODIUM 100 MG/ML
40 INJECTION SUBCUTANEOUS DAILY
Refills: 0 | Status: DISCONTINUED | OUTPATIENT
Start: 2020-08-11 | End: 2020-08-13

## 2020-08-10 RX ORDER — OXYCODONE HYDROCHLORIDE 5 MG/1
5 TABLET ORAL EVERY 6 HOURS
Refills: 0 | Status: DISCONTINUED | OUTPATIENT
Start: 2020-08-10 | End: 2020-08-13

## 2020-08-10 RX ORDER — OXYCODONE HYDROCHLORIDE 5 MG/1
5 TABLET ORAL ONCE
Refills: 0 | Status: DISCONTINUED | OUTPATIENT
Start: 2020-08-10 | End: 2020-08-10

## 2020-08-10 RX ORDER — ERTAPENEM SODIUM 1 G/1
1000 INJECTION, POWDER, LYOPHILIZED, FOR SOLUTION INTRAMUSCULAR; INTRAVENOUS EVERY 24 HOURS
Refills: 0 | Status: DISCONTINUED | OUTPATIENT
Start: 2020-08-10 | End: 2020-08-19

## 2020-08-10 RX ORDER — VANCOMYCIN HCL 1 G
1000 VIAL (EA) INTRAVENOUS EVERY 12 HOURS
Refills: 0 | Status: DISCONTINUED | OUTPATIENT
Start: 2020-08-10 | End: 2020-08-18

## 2020-08-10 RX ORDER — SOD SULF/SODIUM/NAHCO3/KCL/PEG
4000 SOLUTION, RECONSTITUTED, ORAL ORAL ONCE
Refills: 0 | Status: COMPLETED | OUTPATIENT
Start: 2020-08-10 | End: 2020-08-10

## 2020-08-10 RX ADMIN — ERTAPENEM SODIUM 120 MILLIGRAM(S): 1 INJECTION, POWDER, LYOPHILIZED, FOR SOLUTION INTRAMUSCULAR; INTRAVENOUS at 21:25

## 2020-08-10 RX ADMIN — ZINC OXIDE 1 APPLICATION(S): 200 OINTMENT TOPICAL at 21:29

## 2020-08-10 RX ADMIN — ZINC OXIDE 1 APPLICATION(S): 200 OINTMENT TOPICAL at 14:57

## 2020-08-10 RX ADMIN — OXYCODONE HYDROCHLORIDE 5 MILLIGRAM(S): 5 TABLET ORAL at 16:13

## 2020-08-10 RX ADMIN — OXYCODONE HYDROCHLORIDE 5 MILLIGRAM(S): 5 TABLET ORAL at 12:46

## 2020-08-10 RX ADMIN — OXYCODONE HYDROCHLORIDE 5 MILLIGRAM(S): 5 TABLET ORAL at 00:00

## 2020-08-10 RX ADMIN — OXYCODONE HYDROCHLORIDE 5 MILLIGRAM(S): 5 TABLET ORAL at 14:00

## 2020-08-10 RX ADMIN — OXYCODONE HYDROCHLORIDE 5 MILLIGRAM(S): 5 TABLET ORAL at 07:37

## 2020-08-10 RX ADMIN — Medication 250 MILLIGRAM(S): at 22:10

## 2020-08-10 RX ADMIN — ZINC OXIDE 1 APPLICATION(S): 200 OINTMENT TOPICAL at 05:38

## 2020-08-10 RX ADMIN — LIDOCAINE 1 PATCH: 4 CREAM TOPICAL at 11:48

## 2020-08-10 RX ADMIN — OXYCODONE HYDROCHLORIDE 5 MILLIGRAM(S): 5 TABLET ORAL at 22:15

## 2020-08-10 RX ADMIN — OXYCODONE HYDROCHLORIDE 5 MILLIGRAM(S): 5 TABLET ORAL at 23:00

## 2020-08-10 NOTE — PROGRESS NOTE ADULT - PROBLEM SELECTOR PLAN 2
L4/5 discitis w/ possible abscesses on MR  - Observe off abx and obtain Bcx x 2 per ID  - No current neurological deficits at present, will monitor on neuro checks q4h for now  - IR approved for spinal biopsy, will proceed after Bcx clearance, likely Monday/Tuesday. Plan for 8/10, make NPO the night before. Hold DVT ppx.   -no relief of lower back pain with Tylenol. Toradol q6 prn and lidocaine patch with good effect. L4/5 discitis w/ possible abscesses on MR  - Observe off abx and obtain Bcx x 2 per ID. BCx neg  - No current neurological deficits at present, will monitor on neuro checks q4h for now  - IR to do spine bx on 8/10. NPO at midnight, Lovenox held.  -no relief of lower back pain with Tylenol, Toradol, and lidocaine patch. Oxy 5 with good effect. L4/5 discitis w/ possible abscesses on MR  - Observe off abx and obtain Bcx x 2 per ID. BCx neg  - No current neurological deficits at present, will monitor on neuro checks q4h for now  - IR to do spine bx on 8/10.   -no relief of lower back pain with Tylenol, Toradol, and lidocaine patch. Oxy 5 with good effect.  -Per ID, determine pathogen with IR bx (check path, bacterial cx, fungal, AFB). Would recommend starting vanc 1 g q12 and ertapenem 1g q24 after bx (narrow based on cx results)

## 2020-08-10 NOTE — PROGRESS NOTE ADULT - SUBJECTIVE AND OBJECTIVE BOX
Internal Medicine Progress Note    =======================================================  CONTACT KENYATTA Wolf M.D., PGY-1  Pager:  [phone] ([facility])  =======================================================    Patient is a 75y old  Male who presents with a chief complaint of Perianal discharge and low back pain x4 weeks (09 Aug 2020 10:45)      SUBJECTIVE / OVERNIGHT EVENTS:    MEDICATIONS  (STANDING):  famotidine    Tablet 20 milliGRAM(s) Oral two times a day  lidocaine   Patch 1 Patch Transdermal daily  loratadine 10 milliGRAM(s) Oral daily  zinc oxide 20% Ointment 1 Application(s) Topical every 8 hours    MEDICATIONS  (PRN):  acetaminophen   Tablet .. 650 milliGRAM(s) Oral every 6 hours PRN Temp greater or equal to 38C (100.4F), Mild Pain (1 - 3), Moderate Pain (4 - 6)  ketorolac   Injectable 15 milliGRAM(s) IV Push every 6 hours PRN Moderate Pain (4 - 6)  oxyCODONE    IR 5 milliGRAM(s) Oral daily PRN Moderate Pain (4 - 6)    Vital Signs Last 24 Hrs  T(C): 36.7 (10 Aug 2020 05:33), Max: 36.7 (10 Aug 2020 05:33)  T(F): 98.1 (10 Aug 2020 05:33), Max: 98.1 (10 Aug 2020 05:33)  HR: 92 (10 Aug 2020 05:33) (80 - 92)  BP: 123/85 (10 Aug 2020 05:33) (111/86 - 131/80)  BP(mean): --  RR: 18 (10 Aug 2020 05:33) (18 - 18)  SpO2: 98% (10 Aug 2020 05:33) (98% - 100%)    CAPILLARY BLOOD GLUCOSE        I&O's Summary    09 Aug 2020 07:01  -  10 Aug 2020 06:33  --------------------------------------------------------  IN: 0 mL / OUT: 800 mL / NET: -800 mL        PHYSICAL EXAM  GENERAL: NAD  HEAD:  Atraumatic  EYES: EOMI, PERRLA, conjunctiva and sclera clear  NECK: Supple, No JVD  CHEST/LUNG: Clear to auscultation bilaterally; No wheeze  HEART: Regular rate and rhythm; No murmurs, rubs, or gallops  ABDOMEN: Soft, Nontender, Nondistended; Bowel sounds present  EXTREMITIES:  2+ Peripheral Pulses, No clubbing, cyanosis, or edema  NEURO/PSYCH: AAOx3, nonfocal  SKIN: No rashes or lesions      LABS:                        13.6   8.76  )-----------( 223      ( 09 Aug 2020 06:40 )             42.3     Hemoglobin: 13.6 g/dL (08-09 @ 06:40)  Hemoglobin: 14.9 g/dL (08-08 @ 10:18)  Hemoglobin: 14.3 g/dL (08-07 @ 05:15)  Hemoglobin: 15.6 g/dL (08-05 @ 06:55)    CBC Full  -  ( 09 Aug 2020 06:40 )  WBC Count : 8.76 K/uL  RBC Count : 4.42 M/uL  Hemoglobin : 13.6 g/dL  Hematocrit : 42.3 %  Platelet Count - Automated : 223 K/uL  Mean Cell Volume : 95.7 fL  Mean Cell Hemoglobin : 30.8 pg  Mean Cell Hemoglobin Concentration : 32.2 %  Auto Neutrophil # : x  Auto Lymphocyte # : x  Auto Monocyte # : x  Auto Eosinophil # : x  Auto Basophil # : x  Auto Neutrophil % : x  Auto Lymphocyte % : x  Auto Monocyte % : x  Auto Eosinophil % : x  Auto Basophil % : x    08-09    142  |  105  |  11  ----------------------------<  115<H>  4.1   |  25  |  0.84    Ca    8.5      09 Aug 2020 06:40  Phos  3.5     08-09  Mg     2.0     08-09      Creatinine Trend: 0.84<--, 0.79<--, 0.77<--, 0.81<--, 0.93<--        hs Troponin:              CSF:                      EKG:   MICROBIOLOGY:    IMAGING:      Labs, imaging, EKG personally reviewed Internal Medicine Progress Note    =======================================================  CONTACT KENYATTA Wolf M.D., PGY-1  Pager:  [phone] ([facility])  =======================================================    Patient is a 75y old  Male who presents with a chief complaint of Perianal discharge and low back pain x4 weeks (09 Aug 2020 10:45)      SUBJECTIVE / OVERNIGHT EVENTS:  NAEO.    This am, pt continues to c/o anal pain and lower back pain, unchanged from initial presentation. He notes good effect with oxy 5. No other complaints or concerns at this time.    MEDICATIONS  (STANDING):  famotidine    Tablet 20 milliGRAM(s) Oral two times a day  lidocaine   Patch 1 Patch Transdermal daily  loratadine 10 milliGRAM(s) Oral daily  zinc oxide 20% Ointment 1 Application(s) Topical every 8 hours    MEDICATIONS  (PRN):  acetaminophen   Tablet .. 650 milliGRAM(s) Oral every 6 hours PRN Temp greater or equal to 38C (100.4F), Mild Pain (1 - 3), Moderate Pain (4 - 6)  ketorolac   Injectable 15 milliGRAM(s) IV Push every 6 hours PRN Moderate Pain (4 - 6)  oxyCODONE    IR 5 milliGRAM(s) Oral daily PRN Moderate Pain (4 - 6)    Vital Signs Last 24 Hrs  T(C): 36.7 (10 Aug 2020 05:33), Max: 36.7 (10 Aug 2020 05:33)  T(F): 98.1 (10 Aug 2020 05:33), Max: 98.1 (10 Aug 2020 05:33)  HR: 92 (10 Aug 2020 05:33) (80 - 92)  BP: 123/85 (10 Aug 2020 05:33) (111/86 - 131/80)  BP(mean): --  RR: 18 (10 Aug 2020 05:33) (18 - 18)  SpO2: 98% (10 Aug 2020 05:33) (98% - 100%)    CAPILLARY BLOOD GLUCOSE        I&O's Summary    09 Aug 2020 07:01  -  10 Aug 2020 06:33  --------------------------------------------------------  IN: 0 mL / OUT: 800 mL / NET: -800 mL        PHYSICAL EXAM  GENERAL: NAD  HEAD:  Atraumatic  EYES: EOMI, PERRLA, conjunctiva and sclera clear  NECK: Supple, No JVD  CHEST/LUNG: Clear to auscultation bilaterally; No wheeze  HEART: Regular rate and rhythm; No murmurs, rubs, or gallops  ABDOMEN: Soft, Nontender, Nondistended; Bowel sounds present  EXTREMITIES:  2+ Peripheral Pulses, No clubbing, cyanosis, or edema  NEURO/PSYCH: AAOx3, nonfocal  SKIN: No rashes or lesions      LABS:                        13.6   8.76  )-----------( 223      ( 09 Aug 2020 06:40 )             42.3     Hemoglobin: 13.6 g/dL (08-09 @ 06:40)  Hemoglobin: 14.9 g/dL (08-08 @ 10:18)  Hemoglobin: 14.3 g/dL (08-07 @ 05:15)  Hemoglobin: 15.6 g/dL (08-05 @ 06:55)    CBC Full  -  ( 09 Aug 2020 06:40 )  WBC Count : 8.76 K/uL  RBC Count : 4.42 M/uL  Hemoglobin : 13.6 g/dL  Hematocrit : 42.3 %  Platelet Count - Automated : 223 K/uL  Mean Cell Volume : 95.7 fL  Mean Cell Hemoglobin : 30.8 pg  Mean Cell Hemoglobin Concentration : 32.2 %  Auto Neutrophil # : x  Auto Lymphocyte # : x  Auto Monocyte # : x  Auto Eosinophil # : x  Auto Basophil # : x  Auto Neutrophil % : x  Auto Lymphocyte % : x  Auto Monocyte % : x  Auto Eosinophil % : x  Auto Basophil % : x    08-09    142  |  105  |  11  ----------------------------<  115<H>  4.1   |  25  |  0.84    Ca    8.5      09 Aug 2020 06:40  Phos  3.5     08-09  Mg     2.0     08-09      Creatinine Trend: 0.84<--, 0.79<--, 0.77<--, 0.81<--, 0.93<--        hs Troponin:              CSF:                      EKG:   MICROBIOLOGY:    IMAGING:      Labs, imaging, EKG personally reviewed

## 2020-08-10 NOTE — PROGRESS NOTE ADULT - PROBLEM SELECTOR PLAN 1
Presented with weeks of feculent drainage. 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 Dilaudid PRN for pain  - CEA elevated; GI consulted for colonoscopy for malignancy workup, pt amenable to inpatient colo. GI aware, possible colo on Tues 8/11? Presented with weeks of feculent drainage. 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   - CEA elevated; GI consulted for colonoscopy for malignancy workup, pt amenable to inpatient colo. GI aware, possible colo on Tues 8/11?   -Will f/u with GI today Presented with weeks of feculent drainage. 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   - CEA elevated; GI consulted for colonoscopy for malignancy workup, pt amenable to inpatient colo. GI aware, colo on 8/11. CLD today after IR bx, NPO at midnight.

## 2020-08-10 NOTE — PROGRESS NOTE ADULT - ATTENDING COMMENTS
IR for bx of l-spine lesion and lung lesion. F/u Cx results as well.  Start IV abx as per ID once samples obtained.  C-scope by GI tomorrow, f/u bx results.

## 2020-08-10 NOTE — PROGRESS NOTE ADULT - SUBJECTIVE AND OBJECTIVE BOX
GENERAL SURGERY DAILY PROGRESS NOTE:       Subjective: Patient examined at bedside. No acute events overnight. Patient and wife upset that IR biopsy taking so long.  Continues to endorse pain. NPO for proceudre today.  Anxious about schedule for tomorrow and when he will start prep.      Objective:    Vital Signs Last 24 Hrs  T(C): 36.7 (10 Aug 2020 15:18), Max: 36.7 (10 Aug 2020 05:33)  T(F): 98 (10 Aug 2020 15:18), Max: 98.1 (10 Aug 2020 05:33)  HR: 78 (10 Aug 2020 15:18) (78 - 92)  BP: 134/82 (10 Aug 2020 15:18) (123/85 - 134/82)  BP(mean): --  RR: 18 (10 Aug 2020 15:18) (18 - 18)  SpO2: 97% (10 Aug 2020 15:18) (97% - 98%)    I&O's Detail    09 Aug 2020 07:01  -  10 Aug 2020 07:00  --------------------------------------------------------  IN:  Total IN: 0 mL    OUT:    Voided: 800 mL  Total OUT: 800 mL    Total NET: -800 mL          Physical Exam:    General: NAD,lay comfortably in bed  Resp: Breathing comfortably on RA, no increased work of breathing  Abd: soft, non-tender, non distended  Ext: warm and well perfused, grossly symmetric      Laboratory Results:                          14.1   11.71 )-----------( 220      ( 10 Aug 2020 06:30 )             43.4     08-10    139  |  102  |  10  ----------------------------<  95  4.5   |  26  |  0.74    Ca    9.1      10 Aug 2020 06:30  Phos  3.6     08-10  Mg     2.0     08-10            Radiology and Additional Tests:    Medications:    MEDICATIONS  (STANDING):  famotidine    Tablet 20 milliGRAM(s) Oral two times a day  lidocaine   Patch 1 Patch Transdermal daily  loratadine 10 milliGRAM(s) Oral daily  polyethylene glycol/electrolyte Solution. 4000 milliLiter(s) Oral once  zinc oxide 20% Ointment 1 Application(s) Topical every 8 hours    MEDICATIONS  (PRN):  acetaminophen   Tablet .. 650 milliGRAM(s) Oral every 6 hours PRN Temp greater or equal to 38C (100.4F), Mild Pain (1 - 3), Moderate Pain (4 - 6)  ketorolac   Injectable 15 milliGRAM(s) IV Push every 6 hours PRN Moderate Pain (4 - 6)  oxyCODONE    IR 5 milliGRAM(s) Oral every 6 hours PRN Moderate Pain (4 - 6)

## 2020-08-10 NOTE — PROGRESS NOTE ADULT - SUBJECTIVE AND OBJECTIVE BOX
Chief Complaint:  Patient is a 75y old  Male who presents with a chief complaint of Perianal discharge and low back pain x4 weeks (06 Aug 2020 08:23)    Interval Hx:   - Pt underwent EUA w/ rectal biopsies   - To receive inpatient colonoscopy tomorrow   - Continues to have foul smelling drainage from rectum, burning rectal pain slightly improved from prior       Allergies:  dust (Rhinitis; Headache)  No Known Drug Allergies      Home Medications:    Hospital Medications:  acetaminophen   Tablet .. 650 milliGRAM(s) Oral every 6 hours PRN  heparin   Injectable 5000 Unit(s) SubCutaneous every 12 hours  loratadine 10 milliGRAM(s) Oral daily  sodium chloride 0.9% lock flush 3 milliLiter(s) IV Push every 8 hours  zinc oxide 20% Ointment 1 Application(s) Topical every 8 hours      PMHX/PSHX:  H/O: Obesity  Basal Cell Cancer; Right side distal nose  Seasonal Allergies  H/O skin graft  Basal Cell Carcinoma of Anterior Chest; s/p MOHS surgery 8/10 proximal to suprasternal notch  Basal Cell Carcinoma of Anterior Chest; s/p MOHS surgery upper chest proximal suprasternal  notch      Family history:  No pertinent family history in first degree relatives      Denies family history of colon cancer/polyps, stomach cancer/polyps, pancreatic cancer/masses, liver cancer/disease, ovarian cancer and endometrial cancer.    Social History:     Tob: Denies  EtOH: Denies  Illicit Drugs: Denies    ROS:     General:  No wt loss, fevers, chills, night sweats, fatigue  Eyes:  Good vision, no reported pain  ENT:  No sore throat, pain, runny nose, dysphagia  CV:  No pain, palpitations, hypo/hypertension  Pulm:  No dyspnea, cough, tachypnea, wheezing  GI:  No pain, No nausea, No vomiting, No diarrhea, No constipation, No weight loss, No fever, No pruritis, No rectal bleeding, No tarry stools, No dysphagia,  :  No pain, bleeding, incontinence, nocturia  Muscle:  No pain, weakness  Neuro:  No weakness, tingling, memory problems  Psych:  No fatigue, insomnia, mood problems, depression  Endocrine:  No polyuria, polydipsia, cold/heat intolerance  Heme:  No petechiae, ecchymosis, easy bruisability  Skin:  No rash, tattoos, scars, edema    PHYSICAL EXAM:     GENERAL:  No acute distress, elderly man   HEENT:  Normocephalic/atraumatic, no scleral icterus  CHEST:  Clear to auscultation bilaterally, no wheezes/rales/ronchi, no accessory muscle use  HEART:  Regular rate and rhythm, no murmurs/rubs/gallops  ABDOMEN:  Soft, non-tender, mildly distended, +umbilical hernia, normoactive bowel sounds,  no masses  RECTAL (): +fistula tract at 10' clock w/ +foul smelling yellow-brown discharge   EXTREMITIES: No cyanosis, clubbing, or edema  SKIN:  warm/dry  NEURO:  Alert and oriented x 3    Vital Signs:  Vital Signs Last 24 Hrs  T(C): 36.4 (06 Aug 2020 05:52), Max: 36.6 (05 Aug 2020 21:33)  T(F): 97.5 (06 Aug 2020 05:52), Max: 97.8 (05 Aug 2020 21:33)  HR: 67 (06 Aug 2020 05:52) (67 - 86)  BP: 132/82 (06 Aug 2020 05:52) (112/74 - 132/82)  BP(mean): --  RR: 18 (06 Aug 2020 05:52) (18 - 18)  SpO2: 98% (06 Aug 2020 05:52) (98% - 99%)  Daily     Daily     LABS:                        15.6   8.15  )-----------( 269      ( 05 Aug 2020 06:55 )             48.5       08-06    137  |  98  |  8   ----------------------------<  85  3.5   |  25  |  0.77    Ca    9.3      06 Aug 2020 07:15  Phos  4.1     08-06  Mg     2.0     08-06    TPro  7.7  /  Alb  3.6  /  TBili  0.7  /  DBili  x   /  AST  15  /  ALT  12  /  AlkPhos  107  08-04    LIVER FUNCTIONS - ( 04 Aug 2020 12:50 )  Alb: 3.6 g/dL / Pro: 7.7 g/dL / ALK PHOS: 107 u/L / ALT: 12 u/L / AST: 15 u/L / GGT: x           PT/INR - ( 04 Aug 2020 12:50 )   PT: 14.5 SEC;   INR: 1.28          PTT - ( 04 Aug 2020 12:50 )  PTT:35.8 SEC  Urinalysis Basic - ( 05 Aug 2020 13:26 )    Color: YELLOW / Appearance: CLEAR / S.025 / pH: 6.0  Gluc: NEGATIVE / Ketone: TRACE  / Bili: NEGATIVE / Urobili: SMALL   Blood: NEGATIVE / Protein: NEGATIVE / Nitrite: NEGATIVE   Leuk Esterase: NEGATIVE / RBC: x / WBC x   Sq Epi: x / Non Sq Epi: x / Bacteria: x                              15.6   8.15  )-----------( 269      ( 05 Aug 2020 06:55 )             48.5                         16.8   12.02 )-----------( 284      ( 04 Aug 2020 12:50 )             49.8       Imaging:    < from: CT Abdomen and Pelvis w/ IV Cont (20 @ 16:03) >  EXAM:  CT ABDOMEN AND PELVIS IC        PROCEDURE DATE:  Aug  4 2020         INTERPRETATION:  CLINICAL INFORMATION: Rectal discharge. Fistula with stool.    COMPARISON: None.    PROCEDURE:  CT of the Abdomen and Pelvis was performed with intravenouscontrast.  Intravenous contrast: 90 ml Omnipaque 350. 10 ml discarded.  Oral contrast: None.  Sagittal and coronal reformats were performed.    FINDINGS:  LOWER CHEST: Partially imaged large pulmonary nodules, suspicious for metastatic disease. A left lower lobe nodule measures 3.1 cm and a right lower lobe nodule measures 2.3 cm.    LIVER: Within normal limits.  BILE DUCTS: Normal caliber.  GALLBLADDER: Within normal limits.  SPLEEN: Within normal limits.  PANCREAS: Within normal limits.  ADRENALS: Within normal limits.  KIDNEYS/URETERS: Within normal limits.    BLADDER: Within normal limits.  REPRODUCTIVE ORGANS: Prostate within normal limits.    BOWEL: Complex perianal fistula with extensive inflammation. Periampullary duodenal diverticulum. No bowel obstruction. Appendix is normal. Colonic diverticulosis.  PERITONEUM: No ascites.  VESSELS: Atherosclerotic changes.  RETROPERITONEUM/LYMPH NODES: Enlarged left common iliac node (series 2, image 79), measuring 1.2 x 1.2 cm.  ABDOMINAL WALL: Fat-containing umbilical hernia.  BONES: Erosive changes along the inferior endplate of L4 and the superior endplate of L5. Degenerative changes.    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    Erosive changes at L4-L5, suspicious for discitis osteomyelitis.    Bilateral pulmonary nodules, suspicious for metastatic disease.

## 2020-08-10 NOTE — PROGRESS NOTE ADULT - ATTENDING COMMENTS
- Needs colonoscopy. Explained high concern for an advance low rectal/anal cancer. Will need additional biopsies during colonoscopy  - Pt understands the findings from the EUA last week and concerns    Aziza Valdez MD

## 2020-08-10 NOTE — PROGRESS NOTE ADULT - SUBJECTIVE AND OBJECTIVE BOX
CC: F/U for OM    Saw/spoke to patient. No fevers, no chills. No new complaints for IR biopsy today. Feels well.    Allergies  dust (Rhinitis; Headache)  No Known Drug Allergies    ANTIMICROBIALS:  Off    PE:    Vital Signs Last 24 Hrs  T(C): 36.7 (10 Aug 2020 05:33), Max: 36.7 (10 Aug 2020 05:33)  T(F): 98.1 (10 Aug 2020 05:33), Max: 98.1 (10 Aug 2020 05:33)  HR: 92 (10 Aug 2020 05:33) (80 - 92)  BP: 123/85 (10 Aug 2020 05:33) (111/86 - 131/80)  RR: 18 (10 Aug 2020 05:33) (18 - 18)  SpO2: 98% (10 Aug 2020 05:33) (98% - 100%)    Gen: AOx3, NAD, non-toxic, pleasant  CV: S1+S2 normal, nontachycardic  Resp: Clear bilat, no resp distress, no crackles/wheezes  Abd: Soft, nontender, +BS  Ext: No LE edema, no wounds    LABS:                        14.1   11.71 )-----------( 220      ( 10 Aug 2020 06:30 )             43.4     08-10    139  |  102  |  10  ----------------------------<  95  4.5   |  26  |  0.74    Ca    9.1      10 Aug 2020 06:30  Phos  3.6     08-10  Mg     2.0     08-10    MICROBIOLOGY:    .Blood Blood-Peripheral  08-06-20   No growth to date.     RADIOLOGY:    8/6 CXR:    FINDINGS:    The lungs are clear.  No pleural effusion or pneumothorax.  Heart size cannot be appropriately assessed in this projection.  No acute osseous abnormalities.    IMPRESSION:    Clear lungs.

## 2020-08-10 NOTE — PROCEDURE NOTE - SPECIMEN OBTAINED
Fluid sent for cytology/Cores given to Cytopathology Technologist/Pathologist/Fluid sent for gram stain and culture

## 2020-08-10 NOTE — PROGRESS NOTE ADULT - PROBLEM SELECTOR PLAN 3
Incidental finding of b/l lung nodule on CT A/P, confirmed on CT chest  - Pulm agreed on IR biopsy. Less concern for septic emboli given neg BCx  - IR approved for lung biopsy, will do with spinal biopsy

## 2020-08-10 NOTE — PROGRESS NOTE ADULT - ASSESSMENT
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 Also with pulmonary nodules of unclear etiology. S/p EUA w/ bx by surgery on 8/7. Pending lung/spine biopsy w/ IR, and colonoscopy w/ GI. 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 Also with pulmonary nodules of unclear etiology. S/p EUA w/ bx by surgery on 8/7. Pending lung/spine biopsy w/ IR on 8/10, and colonoscopy w/ GI possibly on 8/11.

## 2020-08-10 NOTE — CHART NOTE - NSCHARTNOTEFT_GEN_A_CORE
Pt denies any acute events overnight. He continues to have intermittent anal/rectal pain and drainage which is to be expected. Pt states he is supposed to get the lung and spine biopsy today and will prep later today for a colonoscopy tomorrow. Explained to pt my concern that this is likely a locally advanced rectal/anal cancer.    - needs inpatient colonoscopy (appreciate GI's assistance, near obstructing process so pediatric scope might be needed, would also appreciate additional biopsies being taken during the colonoscopy)  - discussed with the patient that pending the colonoscopy and biopsy results that there is a strong likelihood he will need a diverting ostomy for this near obstructing distal rectal/anal process    Aziza Valdez MD  Colorectal Surgery  Attending Physician

## 2020-08-10 NOTE — PROGRESS NOTE ADULT - PROBLEM SELECTOR PLAN 5
DVT - Lovenox 40 subq  Diet - CC + DASH diet  Activity - Ambulate as tolerated  Dispo: Pending workup DVT - Lovenox 40 subq, held for IR bx   Diet - CC + DASH diet, NPO for bx today  Activity - Ambulate as tolerated  Dispo: Pending workup DVT - Lovenox 40 subq, held for IR bx. RESUME AFTER BX  Diet - NPO for bx today. CLD after bx for colo tomorrow. NPO at midnight  Activity - Ambulate as tolerated  Dispo: Pending workup

## 2020-08-10 NOTE — PROGRESS NOTE ADULT - ASSESSMENT
75 M with history of Basal Cell Carcinoma of nose s/p surgical resection with nasal reconstruction who presents to the hospital with complaints of perianal rectal discharge and lower back pain x4 weeks  Leukocytosis, no fever  Feculent perianal discharge and low back pain  CT with concern for possible L1 OM, perianal fistula, pulmonary nodules  Uncertain if active infection, concern that radiographic findings all due to metastatic malignancy  MR with ? suspicion for OM, collection  BCXs NGTD  Overall,  1) L1 Spinal lesion/OM  - Possible OM, on MRI, would biopsy considering uncertainties and unclear pathogen  - F/U BCXs  - IR eval, biopsy to lesion to determine pathogen? (check path, bacterial cultures > fungal, AFB cultures)  - Close neurochecks to ensure to signs neurocompromise (if so, would consult Neurosurgery stat)  - After IR biopsy to spine, would start Vanco 1g q 12 (monitor levels), and Ertapenem 1g q 24 (then will narrow based on culture results)  2) Fistula  - Unclear significance of fat stranding, for now would hold abx and observe  - F/U surgery  3) Leukocytosis  - Monitor CBC, monitor for fevers    Champ Segovia MD  Pager [phone]  After 5pm and on weekends call [phone]

## 2020-08-10 NOTE — PROGRESS NOTE ADULT - ASSESSMENT
76 y/o M w/ Hx BCC s/p resection who p/w rectal discharge and pain x 4 weeks. GI now consulted for colonoscopy to r/o malignancy in the setting of complex perianal fistula.       Impression:   #Perianal fistula: pt found to have complex perianal fistula on CT a/p after 4 weeks of rectal discharge and pain. Found to have leukocytosis, Hb 15-16s w/ no prior Hx of colonoscopy. Ddx perianal fistula may be colorectal malignancy vs less likely IBD/Crohn's (other etiologies could include radiation proctitis vs anorectal foreign body vs infectious etiologies such as anorectal TB vs LGV however these are less likely without risk factors). Underwent EUA s/p rectal biopsies on 8/7, now awaiting inpatient colonoscopy to r/o malignancy.     Recommendations:   - F/u rectal biopsies from EUA   - Clear liquid diet today  - NPO at MN for colonoscopy tomorrow  - We will order 4L Golytely prep for pt to start at 6pm    - Rest of plan per primary team    Thank you for involving us in the care of this patient, please reach out if any further questions.     Yuval Cotton MD  Gastroenterology Fellow, PGY4    Available on [employer] Teams  [phone] ([facility])  [phone] ([facility])  Please contact on call fellow weekdays after 5pm-7am and weekends: [phone]

## 2020-08-10 NOTE — PROGRESS NOTE ADULT - ASSESSMENT
75M with fistula, with additional concern for metastatic cancer of unknown primary origin, now s/p EUS and biopsy on 8/7, recovering well on floor.    PLAN:  -Plan for CLN w/ GI tomorrow; spoke with them regarding the use of a pediatric scope, and that would would appreciate if they could get biopsies tomorrow during colonoscopy   -IR for biopsy of lung nodule today  -Remainder of care per primary team.    A Surgery  [id number]

## 2020-08-11 LAB
ANION GAP SERPL CALC-SCNC: 13 MMO/L — SIGNIFICANT CHANGE UP (ref 7–14)
BUN SERPL-MCNC: 8 MG/DL — SIGNIFICANT CHANGE UP (ref 7–23)
CALCIUM SERPL-MCNC: 9.5 MG/DL — SIGNIFICANT CHANGE UP (ref 8.4–10.5)
CHLORIDE SERPL-SCNC: 101 MMOL/L — SIGNIFICANT CHANGE UP (ref 98–107)
CO2 SERPL-SCNC: 27 MMOL/L — SIGNIFICANT CHANGE UP (ref 22–31)
CREAT SERPL-MCNC: 0.76 MG/DL — SIGNIFICANT CHANGE UP (ref 0.5–1.3)
CULTURE RESULTS: SIGNIFICANT CHANGE UP
CULTURE RESULTS: SIGNIFICANT CHANGE UP
GLUCOSE SERPL-MCNC: 93 MG/DL — SIGNIFICANT CHANGE UP (ref 70–99)
HCT VFR BLD CALC: 46.9 % — SIGNIFICANT CHANGE UP (ref 39–50)
HGB BLD-MCNC: 14.6 G/DL — SIGNIFICANT CHANGE UP (ref 13–17)
MAGNESIUM SERPL-MCNC: 2.1 MG/DL — SIGNIFICANT CHANGE UP (ref 1.6–2.6)
MCHC RBC-ENTMCNC: 30.7 PG — SIGNIFICANT CHANGE UP (ref 27–34)
MCHC RBC-ENTMCNC: 31.1 % — LOW (ref 32–36)
MCV RBC AUTO: 98.5 FL — SIGNIFICANT CHANGE UP (ref 80–100)
NRBC # FLD: 0 K/UL — SIGNIFICANT CHANGE UP (ref 0–0)
PHOSPHATE SERPL-MCNC: 4.2 MG/DL — SIGNIFICANT CHANGE UP (ref 2.5–4.5)
PLATELET # BLD AUTO: 248 K/UL — SIGNIFICANT CHANGE UP (ref 150–400)
PMV BLD: 9.8 FL — SIGNIFICANT CHANGE UP (ref 7–13)
POTASSIUM SERPL-MCNC: 4.3 MMOL/L — SIGNIFICANT CHANGE UP (ref 3.5–5.3)
POTASSIUM SERPL-SCNC: 4.3 MMOL/L — SIGNIFICANT CHANGE UP (ref 3.5–5.3)
RBC # BLD: 4.76 M/UL — SIGNIFICANT CHANGE UP (ref 4.2–5.8)
RBC # FLD: 14.1 % — SIGNIFICANT CHANGE UP (ref 10.3–14.5)
SODIUM SERPL-SCNC: 141 MMOL/L — SIGNIFICANT CHANGE UP (ref 135–145)
SPECIMEN SOURCE: SIGNIFICANT CHANGE UP
SPECIMEN SOURCE: SIGNIFICANT CHANGE UP
WBC # BLD: 7.51 K/UL — SIGNIFICANT CHANGE UP (ref 3.8–10.5)
WBC # FLD AUTO: 7.51 K/UL — SIGNIFICANT CHANGE UP (ref 3.8–10.5)

## 2020-08-11 PROCEDURE — 99232 SBSQ HOSP IP/OBS MODERATE 35: CPT

## 2020-08-11 PROCEDURE — 99233 SBSQ HOSP IP/OBS HIGH 50: CPT | Mod: GC

## 2020-08-11 PROCEDURE — 99232 SBSQ HOSP IP/OBS MODERATE 35: CPT | Mod: GC

## 2020-08-11 RX ORDER — SOD SULF/SODIUM/NAHCO3/KCL/PEG
4000 SOLUTION, RECONSTITUTED, ORAL ORAL ONCE
Refills: 0 | Status: COMPLETED | OUTPATIENT
Start: 2020-08-11 | End: 2020-08-11

## 2020-08-11 RX ADMIN — ERTAPENEM SODIUM 120 MILLIGRAM(S): 1 INJECTION, POWDER, LYOPHILIZED, FOR SOLUTION INTRAMUSCULAR; INTRAVENOUS at 21:17

## 2020-08-11 RX ADMIN — ZINC OXIDE 1 APPLICATION(S): 200 OINTMENT TOPICAL at 21:26

## 2020-08-11 RX ADMIN — ENOXAPARIN SODIUM 40 MILLIGRAM(S): 100 INJECTION SUBCUTANEOUS at 12:34

## 2020-08-11 RX ADMIN — OXYCODONE HYDROCHLORIDE 5 MILLIGRAM(S): 5 TABLET ORAL at 11:30

## 2020-08-11 RX ADMIN — OXYCODONE HYDROCHLORIDE 5 MILLIGRAM(S): 5 TABLET ORAL at 10:16

## 2020-08-11 RX ADMIN — Medication 250 MILLIGRAM(S): at 09:52

## 2020-08-11 RX ADMIN — Medication 250 MILLIGRAM(S): at 22:29

## 2020-08-11 RX ADMIN — Medication 4000 MILLILITER(S): at 16:23

## 2020-08-11 RX ADMIN — OXYCODONE HYDROCHLORIDE 5 MILLIGRAM(S): 5 TABLET ORAL at 22:22

## 2020-08-11 RX ADMIN — ZINC OXIDE 1 APPLICATION(S): 200 OINTMENT TOPICAL at 06:00

## 2020-08-11 RX ADMIN — OXYCODONE HYDROCHLORIDE 5 MILLIGRAM(S): 5 TABLET ORAL at 21:24

## 2020-08-11 RX ADMIN — ZINC OXIDE 1 APPLICATION(S): 200 OINTMENT TOPICAL at 13:38

## 2020-08-11 NOTE — PROGRESS NOTE ADULT - ASSESSMENT
75 M with history of Basal Cell Carcinoma of nose s/p surgical resection with nasal reconstruction who presents to the hospital with complaints of perianal rectal discharge and lower back pain x4 weeks  Leukocytosis, no fever  Feculent perianal discharge and low back pain  CT with concern for possible L1 OM, perianal fistula, pulmonary nodules  Uncertain if active infection, concern that radiographic findings all due to metastatic malignancy  MR with ? suspicion for OM, collection  S/p IR biopsy 8/11 to vertebral lesion  BCXs NGTD  Overall,  1) L1 Spinal lesion/OM  - Possible OM  - Vanco 1g q 12 (monitor levels)  - Ertapenem 1g q 24  - F/U BCXs, F/U IR biopsy culture (appreciate IR procedure)  - Close neurochecks to ensure to signs neurocompromise (if so, would consult Neurosurgery stat)  2) Fistula  - Unclear significance of fat stranding, for now would hold abx and observe  - F/U surgery  - F/U GI for colonoscopy  3) Leukocytosis  - Monitor CBC, monitor for fevers    Champ Segovia MD  Pager [phone]  After 5pm and on weekends call [phone]

## 2020-08-11 NOTE — PROGRESS NOTE ADULT - PROBLEM SELECTOR PLAN 1
Presented with weeks of feculent drainage. 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   - CEA elevated; GI consulted for colonoscopy for malignancy workup, pt amenable to inpatient colo. GI aware, colo on 8/11. CLD today after IR bx, NPO at midnight. Presented with weeks of feculent drainage. 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   - CEA elevated; GI consulted for colonoscopy for malignancy workup, pt amenable to inpatient colo. Planned for colo on 8/11, though pt refused prep. Will plan to resched. Presented with weeks of feculent drainage. 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   - CEA elevated; GI consulted for colonoscopy for malignancy workup, pt amenable to inpatient colo. Planned for colo on 8/11, though pt refused prep. Rescheduled for 8/12. Diet: CLD. Will start prep tonight, NPO at midnight.

## 2020-08-11 NOTE — CHART NOTE - NSCHARTNOTEFT_GEN_A_CORE
Colonoscopy prep and procedure    Earlier this evening had opportunity to meet this pleasant gentleman to discuss colo prep and NPO. Patient is tired from his procedures which happened today and states that his body cannot handle doing a prep without getting sick. anti emetics discussed as possible option patient voiced desire to delay procedure. Risks and benefits of colonscopy discussed including possibly finding colon cancer which colorectal has index of suspicion for. Patient prefers delaying procedure and is willing to stay inpatient if needed. Taken off NPO for now. Lets try to prep in the morning and call down to endo suite for rescheduling.     Timothy Leblanc

## 2020-08-11 NOTE — PROGRESS NOTE ADULT - SUBJECTIVE AND OBJECTIVE BOX
Chief Complaint:  Patient is a 75y old  Male who presents with a chief complaint of Perianal discharge and low back pain x4 weeks (06 Aug 2020 08:23)    Interval Hx:   - Unable to prep overnight due to fatigue from IR procedures  - Pt taken off NPO        Allergies:  dust (Rhinitis; Headache)  No Known Drug Allergies      Home Medications:    Hospital Medications:  acetaminophen   Tablet .. 650 milliGRAM(s) Oral every 6 hours PRN  heparin   Injectable 5000 Unit(s) SubCutaneous every 12 hours  loratadine 10 milliGRAM(s) Oral daily  sodium chloride 0.9% lock flush 3 milliLiter(s) IV Push every 8 hours  zinc oxide 20% Ointment 1 Application(s) Topical every 8 hours      PMHX/PSHX:  H/O: Obesity  Basal Cell Cancer; Right side distal nose  Seasonal Allergies  H/O skin graft  Basal Cell Carcinoma of Anterior Chest; s/p MOHS surgery 810 proximal to suprasternal notch  Basal Cell Carcinoma of Anterior Chest; s/p MOHS surgery upper chest proximal suprasternal  notch      Family history:  No pertinent family history in first degree relatives      Denies family history of colon cancer/polyps, stomach cancer/polyps, pancreatic cancer/masses, liver cancer/disease, ovarian cancer and endometrial cancer.    Social History:     Tob: Denies  EtOH: Denies  Illicit Drugs: Denies    ROS:     General:  No wt loss, fevers, chills, night sweats, fatigue  Eyes:  Good vision, no reported pain  ENT:  No sore throat, pain, runny nose, dysphagia  CV:  No pain, palpitations, hypo/hypertension  Pulm:  No dyspnea, cough, tachypnea, wheezing  GI:  No pain, No nausea, No vomiting, No diarrhea, No constipation, No weight loss, No fever, No pruritis, No rectal bleeding, No tarry stools, No dysphagia,  :  No pain, bleeding, incontinence, nocturia  Muscle:  No pain, weakness  Neuro:  No weakness, tingling, memory problems  Psych:  No fatigue, insomnia, mood problems, depression  Endocrine:  No polyuria, polydipsia, cold/heat intolerance  Heme:  No petechiae, ecchymosis, easy bruisability  Skin:  No rash, tattoos, scars, edema    PHYSICAL EXAM:     GENERAL:  No acute distress, elderly man, WD WN  HEENT:  Normocephalic/atraumatic, no scleral icterus  CHEST:  Clear to auscultation bilaterally, no wheezes/rales/ronchi, no accessory muscle use  HEART:  Regular rate and rhythm, no murmurs/rubs/gallops  ABDOMEN:  Soft, non-tender, mildly distended, +umbilical hernia, normoactive bowel sounds,  no masses  RECTAL (): +fistula tract at 10' clock w/ +foul smelling yellow-brown discharge   EXTREMITIES: No cyanosis, clubbing, or edema  SKIN:  warm/dry  NEURO:  Alert and oriented x 3    Vital Signs:  Vital Signs Last 24 Hrs  T(C): 36.4 (06 Aug 2020 05:52), Max: 36.6 (05 Aug 2020 21:33)  T(F): 97.5 (06 Aug 2020 05:52), Max: 97.8 (05 Aug 2020 21:33)  HR: 67 (06 Aug 2020 05:52) (67 - 86)  BP: 132/82 (06 Aug 2020 05:52) (112/74 - 132/82)  BP(mean): --  RR: 18 (06 Aug 2020 05:52) (18 - 18)  SpO2: 98% (06 Aug 2020 05:52) (98% - 99%)  Daily     Daily     LABS:                        15.6   8.15  )-----------( 269      ( 05 Aug 2020 06:55 )             48.5       08-06    137  |  98  |  8   ----------------------------<  85  3.5   |  25  |  0.77    Ca    9.3      06 Aug 2020 07:15  Phos  4.1     08-06  Mg     2.0     08-06    TPro  7.7  /  Alb  3.6  /  TBili  0.7  /  DBili  x   /  AST  15  /  ALT  12  /  AlkPhos  107  08-04    LIVER FUNCTIONS - ( 04 Aug 2020 12:50 )  Alb: 3.6 g/dL / Pro: 7.7 g/dL / ALK PHOS: 107 u/L / ALT: 12 u/L / AST: 15 u/L / GGT: x           PT/INR - ( 04 Aug 2020 12:50 )   PT: 14.5 SEC;   INR: 1.28          PTT - ( 04 Aug 2020 12:50 )  PTT:35.8 SEC  Urinalysis Basic - ( 05 Aug 2020 13:26 )    Color: YELLOW / Appearance: CLEAR / S.025 / pH: 6.0  Gluc: NEGATIVE / Ketone: TRACE  / Bili: NEGATIVE / Urobili: SMALL   Blood: NEGATIVE / Protein: NEGATIVE / Nitrite: NEGATIVE   Leuk Esterase: NEGATIVE / RBC: x / WBC x   Sq Epi: x / Non Sq Epi: x / Bacteria: x                              15.6   8.15  )-----------( 269      ( 05 Aug 2020 06:55 )             48.5                         16.8   12.02 )-----------( 284      ( 04 Aug 2020 12:50 )             49.8       Imaging:    < from: CT Abdomen and Pelvis w/ IV Cont (20 @ 16:03) >  EXAM:  CT ABDOMEN AND PELVIS IC        PROCEDURE DATE:  Aug  4 2020         INTERPRETATION:  CLINICAL INFORMATION: Rectal discharge. Fistula with stool.    COMPARISON: None.    PROCEDURE:  CT of the Abdomen and Pelvis was performed with intravenouscontrast.  Intravenous contrast: 90 ml Omnipaque 350. 10 ml discarded.  Oral contrast: None.  Sagittal and coronal reformats were performed.    FINDINGS:  LOWER CHEST: Partially imaged large pulmonary nodules, suspicious for metastatic disease. A left lower lobe nodule measures 3.1 cm and a right lower lobe nodule measures 2.3 cm.    LIVER: Within normal limits.  BILE DUCTS: Normal caliber.  GALLBLADDER: Within normal limits.  SPLEEN: Within normal limits.  PANCREAS: Within normal limits.  ADRENALS: Within normal limits.  KIDNEYS/URETERS: Within normal limits.    BLADDER: Within normal limits.  REPRODUCTIVE ORGANS: Prostate within normal limits.    BOWEL: Complex perianal fistula with extensive inflammation. Periampullary duodenal diverticulum. No bowel obstruction. Appendix is normal. Colonic diverticulosis.  PERITONEUM: No ascites.  VESSELS: Atherosclerotic changes.  RETROPERITONEUM/LYMPH NODES: Enlarged left common iliac node (series 2, image 79), measuring 1.2 x 1.2 cm.  ABDOMINAL WALL: Fat-containing umbilical hernia.  BONES: Erosive changes along the inferior endplate of L4 and the superior endplate of L5. Degenerative changes.    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    Erosive changes at L4-L5, suspicious for discitis osteomyelitis.    Bilateral pulmonary nodules, suspicious for metastatic disease. Chief Complaint:  Patient is a 75y old  Male who presents with a chief complaint of Perianal discharge and low back pain x4 weeks (06 Aug 2020 08:23)    Interval Hx:   - Unable to prep overnight due to fatigue from IR procedures  - Pt taken off NPO  - Amenable to prepping for procedure tomorrow    Allergies:  dust (Rhinitis; Headache)  No Known Drug Allergies      Home Medications:    Hospital Medications:  acetaminophen   Tablet .. 650 milliGRAM(s) Oral every 6 hours PRN  heparin   Injectable 5000 Unit(s) SubCutaneous every 12 hours  loratadine 10 milliGRAM(s) Oral daily  sodium chloride 0.9% lock flush 3 milliLiter(s) IV Push every 8 hours  zinc oxide 20% Ointment 1 Application(s) Topical every 8 hours      PMHX/PSHX:  H/O: Obesity  Basal Cell Cancer; Right side distal nose  Seasonal Allergies  H/O skin graft  Basal Cell Carcinoma of Anterior Chest; s/p MOHS surgery 8/10 proximal to suprasternal notch  Basal Cell Carcinoma of Anterior Chest; s/p MOHS surgery upper chest proximal suprasternal  notch      Family history:  No pertinent family history in first degree relatives      Denies family history of colon cancer/polyps, stomach cancer/polyps, pancreatic cancer/masses, liver cancer/disease, ovarian cancer and endometrial cancer.    Social History:     Tob: Denies  EtOH: Denies  Illicit Drugs: Denies    ROS:     General:  No wt loss, fevers, chills, night sweats, fatigue  Eyes:  Good vision, no reported pain  ENT:  No sore throat, pain, runny nose, dysphagia  CV:  No pain, palpitations, hypo/hypertension  Pulm:  No dyspnea, cough, tachypnea, wheezing  GI:  No pain, No nausea, No vomiting, No diarrhea, No constipation, No weight loss, No fever, No pruritis, No rectal bleeding, No tarry stools, No dysphagia,  :  No pain, bleeding, incontinence, nocturia  Muscle:  No pain, weakness  Neuro:  No weakness, tingling, memory problems  Psych:  No fatigue, insomnia, mood problems, depression  Endocrine:  No polyuria, polydipsia, cold/heat intolerance  Heme:  No petechiae, ecchymosis, easy bruisability  Skin:  No rash, tattoos, scars, edema    PHYSICAL EXAM:     GENERAL:  No acute distress, elderly man, WD WN  HEENT:  Normocephalic/atraumatic, no scleral icterus  CHEST:  Clear to auscultation bilaterally, no wheezes/rales/ronchi, no accessory muscle use  HEART:  Regular rate and rhythm, no murmurs/rubs/gallops  ABDOMEN:  Soft, non-tender, mildly distended, +umbilical hernia, normoactive bowel sounds,  no masses  RECTAL (): +fistula tract at 10' clock w/ +foul smelling yellow-brown discharge   EXTREMITIES: No cyanosis, clubbing, or edema  SKIN:  warm/dry  NEURO:  Alert and oriented x 3    Vital Signs:  Vital Signs Last 24 Hrs  T(C): 36.4 (06 Aug 2020 05:52), Max: 36.6 (05 Aug 2020 21:33)  T(F): 97.5 (06 Aug 2020 05:52), Max: 97.8 (05 Aug 2020 21:33)  HR: 67 (06 Aug 2020 05:52) (67 - 86)  BP: 132/82 (06 Aug 2020 05:52) (112/74 - 132/82)  BP(mean): --  RR: 18 (06 Aug 2020 05:52) (18 - 18)  SpO2: 98% (06 Aug 2020 05:52) (98% - 99%)  Daily     Daily     LABS:                        15.6   8.15  )-----------( 269      ( 05 Aug 2020 06:55 )             48.5       08-06    137  |  98  |  8   ----------------------------<  85  3.5   |  25  |  0.77    Ca    9.3      06 Aug 2020 07:15  Phos  4.1     08-06  Mg     2.0     08-06    TPro  7.7  /  Alb  3.6  /  TBili  0.7  /  DBili  x   /  AST  15  /  ALT  12  /  AlkPhos  107  08-04    LIVER FUNCTIONS - ( 04 Aug 2020 12:50 )  Alb: 3.6 g/dL / Pro: 7.7 g/dL / ALK PHOS: 107 u/L / ALT: 12 u/L / AST: 15 u/L / GGT: x           PT/INR - ( 04 Aug 2020 12:50 )   PT: 14.5 SEC;   INR: 1.28          PTT - ( 04 Aug 2020 12:50 )  PTT:35.8 SEC  Urinalysis Basic - ( 05 Aug 2020 13:26 )    Color: YELLOW / Appearance: CLEAR / S.025 / pH: 6.0  Gluc: NEGATIVE / Ketone: TRACE  / Bili: NEGATIVE / Urobili: SMALL   Blood: NEGATIVE / Protein: NEGATIVE / Nitrite: NEGATIVE   Leuk Esterase: NEGATIVE / RBC: x / WBC x   Sq Epi: x / Non Sq Epi: x / Bacteria: x                              15.6   8.15  )-----------( 269      ( 05 Aug 2020 06:55 )             48.5                         16.8   12.02 )-----------( 284      ( 04 Aug 2020 12:50 )             49.8       Imaging:    < from: CT Abdomen and Pelvis w/ IV Cont (20 @ 16:03) >  EXAM:  CT ABDOMEN AND PELVIS IC        PROCEDURE DATE:  Aug  4 2020         INTERPRETATION:  CLINICAL INFORMATION: Rectal discharge. Fistula with stool.    COMPARISON: None.    PROCEDURE:  CT of the Abdomen and Pelvis was performed with intravenouscontrast.  Intravenous contrast: 90 ml Omnipaque 350. 10 ml discarded.  Oral contrast: None.  Sagittal and coronal reformats were performed.    FINDINGS:  LOWER CHEST: Partially imaged large pulmonary nodules, suspicious for metastatic disease. A left lower lobe nodule measures 3.1 cm and a right lower lobe nodule measures 2.3 cm.    LIVER: Within normal limits.  BILE DUCTS: Normal caliber.  GALLBLADDER: Within normal limits.  SPLEEN: Within normal limits.  PANCREAS: Within normal limits.  ADRENALS: Within normal limits.  KIDNEYS/URETERS: Within normal limits.    BLADDER: Within normal limits.  REPRODUCTIVE ORGANS: Prostate within normal limits.    BOWEL: Complex perianal fistula with extensive inflammation. Periampullary duodenal diverticulum. No bowel obstruction. Appendix is normal. Colonic diverticulosis.  PERITONEUM: No ascites.  VESSELS: Atherosclerotic changes.  RETROPERITONEUM/LYMPH NODES: Enlarged left common iliac node (series 2, image 79), measuring 1.2 x 1.2 cm.  ABDOMINAL WALL: Fat-containing umbilical hernia.  BONES: Erosive changes along the inferior endplate of L4 and the superior endplate of L5. Degenerative changes.    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    Erosive changes at L4-L5, suspicious for discitis osteomyelitis.    Bilateral pulmonary nodules, suspicious for metastatic disease.

## 2020-08-11 NOTE — PROGRESS NOTE ADULT - ASSESSMENT
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 Also with pulmonary nodules of unclear etiology. S/p EUA w/ bx by surgery on 8/7. Pending lung/spine biopsy w/ IR on 8/10, and colonoscopy w/ GI possibly on 8/11. 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 Also with pulmonary nodules of unclear etiology. S/p EUA w/ bx by surgery on 8/7. S/p lung/spine biopsy w/ IR on 8/10. Colonoscopy w/ GI pending.

## 2020-08-11 NOTE — PROGRESS NOTE ADULT - SUBJECTIVE AND OBJECTIVE BOX
CC: F/U for OM    Saw/spoke to patient. Patient well, no new complaints. S/p IR biopsy.    Allergies  dust (Rhinitis; Headache)  No Known Drug Allergies    ANTIMICROBIALS:  ertapenem  IVPB 1000 every 24 hours  vancomycin  IVPB 1000 every 12 hours    PE:    Vital Signs Last 24 Hrs  T(C): 36.4 (11 Aug 2020 05:59), Max: 36.7 (10 Aug 2020 15:18)  T(F): 97.6 (11 Aug 2020 05:59), Max: 98 (10 Aug 2020 15:18)  HR: 69 (11 Aug 2020 05:59) (69 - 78)  BP: 122/75 (11 Aug 2020 05:59) (122/75 - 134/82)  RR: 17 (11 Aug 2020 05:59) (17 - 18)  SpO2: 99% (11 Aug 2020 05:59) (97% - 99%)    Gen: AOx3, NAD, non-toxic  CV: S1+S2 normal, nontachycardic  Resp: Clear bilat, no resp distress, no crackles/wheezes  Abd: Soft, nontender, +BS  Ext: No LE edema, no wounds    LABS:                        14.6   7.51  )-----------( 248      ( 11 Aug 2020 06:05 )             46.9     08-11    141  |  101  |  8   ----------------------------<  93  4.3   |  27  |  0.76    Ca    9.5      11 Aug 2020 06:05  Phos  4.2     08-11  Mg     2.1     08-11    MICROBIOLOGY:    .Tissue L4-L5 VERTEBRAL DISC SPACE BIOPSY  08-11-20   Testing in progress  --    No polymorphonuclear cells seen per low power field  No organisms seen per oil power field    .Blood Blood-Peripheral  08-06-20   No growth to date.     (otherwise reviewed)    RADIOLOGY:    8/10 XR:    IMPRESSION:  No pneumothorax seen.    Elevated right hemidiaphragm.    Left lung nodules with a 3 cm left retrocardiac nodule better seen on the CT scan. Additional nodules noted on the CT scan are not able to be visualized.

## 2020-08-11 NOTE — PROGRESS NOTE ADULT - ASSESSMENT
Mr. Zamora is a 75 Year-Old Gentleman with anal fistula, with additional concern for metastatic cancer of unknown primary origin, now s/p EUS and biopsy on 8/7, recovering well on floor.    PLAN:  - Appreciate IR biopsy.   - Appreciate GI evaluation, plan for scope, requested obtaining repeat biopsy.   - Care per Primary Team appreciated.     A Team Surgery #[id number]

## 2020-08-11 NOTE — PROGRESS NOTE ADULT - ATTENDING COMMENTS
ID, Surgery and GI appreciated.  F/u results of IR guided bx of spinal collection, lung nodule.  ABX started.  C-scope tomorrow to obtain further biopsies.

## 2020-08-11 NOTE — PROGRESS NOTE ADULT - ASSESSMENT
76 y/o M w/ Hx BCC s/p resection who p/w rectal discharge and pain x 4 weeks. GI now consulted for colonoscopy to r/o malignancy in the setting of complex perianal fistula.       Impression:   #Perianal fistula: pt found to have complex perianal fistula on CT a/p after 4 weeks of rectal discharge and pain. Found to have leukocytosis, Hb 15-16s w/ no prior Hx of colonoscopy. Ddx perianal fistula may be colorectal malignancy vs less likely IBD/Crohn's (other etiologies could include radiation proctitis vs anorectal foreign body vs infectious etiologies such as anorectal TB vs LGV however these are less likely without risk factors). Underwent EUA s/p rectal biopsies on 8/7, now awaiting inpatient colonoscopy to r/o malignancy.     Recommendations:   - F/u rectal biopsies from EUA   - If pt would like colonoscopy tomorrow, clear liquid diet today  - NPO at MN for colonoscopy tomorrow  - We will order 4L Golytely prep for pt to start at 6pm if amenable    - Rest of plan per primary team      Thank you for involving us in the care of this patient, please reach out if any further questions.     Yuval Cotton MD  Gastroenterology Fellow, PGY4    Available on [employer] Teams  [phone] ([facility])  [phone] ([facility])  Please contact on call fellow weekdays after 5pm-7am and weekends: [phone] 76 y/o M w/ Hx BCC s/p resection who p/w rectal discharge and pain x 4 weeks. GI now consulted for colonoscopy to r/o malignancy in the setting of complex perianal fistula.       Impression:   #Perianal fistula: pt found to have complex perianal fistula on CT a/p after 4 weeks of rectal discharge and pain. Found to have leukocytosis, Hb 15-16s w/ no prior Hx of colonoscopy. Ddx perianal fistula may be colorectal malignancy vs less likely IBD/Crohn's (other etiologies could include radiation proctitis vs anorectal foreign body vs infectious etiologies such as anorectal TB vs LGV however these are less likely without risk factors). Underwent EUA s/p rectal biopsies on 8/7, now awaiting inpatient colonoscopy to r/o malignancy.     Recommendations:   - F/u rectal biopsies from EUA   - If pt would like colonoscopy tomorrow, clear liquid diet today  - NPO at MN for colonoscopy tomorrow  - We will order 4L Golytely prep for pt to start at 6pm if amenable    - Rest of plan per primary team    Thank you for involving us in the care of this patient, please reach out if any further questions.     Yuval Cotton MD  Gastroenterology Fellow, PGY4    Available on [employer] Teams  [phone] ([facility])  [phone] ([facility])  Please contact on call fellow weekdays after 5pm-7am and weekends: [phone]

## 2020-08-11 NOTE — PROGRESS NOTE ADULT - SUBJECTIVE AND OBJECTIVE BOX
Surgery Progress Note     Subjective/24hour Events:   Patient seen and examined.   Did not drink prep last night due to not being mentally prepared.   Willing to undergo colonoscopy this morning.     Vital Signs:  Vital Signs Last 24 Hrs  T(C): 36.4 (11 Aug 2020 05:59), Max: 36.7 (10 Aug 2020 15:18)  T(F): 97.6 (11 Aug 2020 05:59), Max: 98 (10 Aug 2020 15:18)  HR: 69 (11 Aug 2020 05:59) (69 - 78)  BP: 122/75 (11 Aug 2020 05:59) (122/75 - 134/82)  BP(mean): --  RR: 17 (11 Aug 2020 05:59) (17 - 18)  SpO2: 99% (11 Aug 2020 05:59) (97% - 99%)    CAPILLARY BLOOD GLUCOSE          I&O's Detail    10 Aug 2020 07:01  -  11 Aug 2020 07:00  --------------------------------------------------------  IN:  Total IN: 0 mL    OUT:    Voided: 700 mL  Total OUT: 700 mL    Total NET: -700 mL          MEDICATIONS  (STANDING):  enoxaparin Injectable 40 milliGRAM(s) SubCutaneous daily  ertapenem  IVPB 1000 milliGRAM(s) IV Intermittent every 24 hours  famotidine    Tablet 20 milliGRAM(s) Oral two times a day  lidocaine   Patch 1 Patch Transdermal daily  loratadine 10 milliGRAM(s) Oral daily  polyethylene glycol/electrolyte Solution. 4000 milliLiter(s) Oral once  vancomycin  IVPB 1000 milliGRAM(s) IV Intermittent every 12 hours  zinc oxide 20% Ointment 1 Application(s) Topical every 8 hours    MEDICATIONS  (PRN):  acetaminophen   Tablet .. 650 milliGRAM(s) Oral every 6 hours PRN Temp greater or equal to 38C (100.4F), Mild Pain (1 - 3), Moderate Pain (4 - 6)  ketorolac   Injectable 15 milliGRAM(s) IV Push every 6 hours PRN Moderate Pain (4 - 6)  oxyCODONE    IR 5 milliGRAM(s) Oral every 6 hours PRN Moderate Pain (4 - 6)      Physical Exam:  Gen: NAD.  Lungs: Non labored breathing.   Ab: Soft, nontender, nondistended.  Rectal: Minimal drainage, no bleeding.    Ext: Moves all 4 spontaneously.     Labs:    08-11    141  |  101  |  8   ----------------------------<  93  4.3   |  27  |  0.76    Ca    9.5      11 Aug 2020 06:05  Phos  4.2     08-11  Mg     2.1     08-11                              14.6   7.51  )-----------( 248      ( 11 Aug 2020 06:05 )             46.9         Imaging:

## 2020-08-11 NOTE — PROGRESS NOTE ADULT - SUBJECTIVE AND OBJECTIVE BOX
Internal Medicine Progress Note    =======================================================  CONTACT KENYATTA Wolf M.D., PGY-1  Pager:  [phone] ([facility])  =======================================================    Patient is a 75y old  Male who presents with a chief complaint of Perianal discharge and low back pain x4 weeks (11 Aug 2020 06:58)      SUBJECTIVE / OVERNIGHT EVENTS:  Pt went for IR bx of spine and lung nodule in the early evening. After bx, pt refused prep for colo today as he was feeling nauseous and exhausted from not eating all day.     This am, pt reports feeling ok, still with lower back pain (good relief w/ oxy 5).     MEDICATIONS  (STANDING):  enoxaparin Injectable 40 milliGRAM(s) SubCutaneous daily  ertapenem  IVPB 1000 milliGRAM(s) IV Intermittent every 24 hours  famotidine    Tablet 20 milliGRAM(s) Oral two times a day  lidocaine   Patch 1 Patch Transdermal daily  loratadine 10 milliGRAM(s) Oral daily  vancomycin  IVPB 1000 milliGRAM(s) IV Intermittent every 12 hours  zinc oxide 20% Ointment 1 Application(s) Topical every 8 hours    MEDICATIONS  (PRN):  acetaminophen   Tablet .. 650 milliGRAM(s) Oral every 6 hours PRN Temp greater or equal to 38C (100.4F), Mild Pain (1 - 3), Moderate Pain (4 - 6)  ketorolac   Injectable 15 milliGRAM(s) IV Push every 6 hours PRN Moderate Pain (4 - 6)  oxyCODONE    IR 5 milliGRAM(s) Oral every 6 hours PRN Moderate Pain (4 - 6)    Vital Signs Last 24 Hrs  T(C): 36.4 (11 Aug 2020 05:59), Max: 36.7 (10 Aug 2020 15:18)  T(F): 97.6 (11 Aug 2020 05:59), Max: 98 (10 Aug 2020 15:18)  HR: 695 (11 Aug 2020 05:59) (70 - 695)  BP: 122/75 (11 Aug 2020 05:59) (122/75 - 134/82)  BP(mean): --  RR: 17 (11 Aug 2020 05:59) (17 - 18)  SpO2: 99% (11 Aug 2020 05:59) (97% - 99%)    CAPILLARY BLOOD GLUCOSE        I&O's Summary    10 Aug 2020 07:01  -  11 Aug 2020 07:00  --------------------------------------------------------  IN: 0 mL / OUT: 700 mL / NET: -700 mL        PHYSICAL EXAM  GENERAL: NAD  HEAD:  Atraumatic  EYES: EOMI, PERRLA, conjunctiva and sclera clear  NECK: Supple, No JVD  CHEST/LUNG: Clear to auscultation bilaterally; No wheeze  HEART: Regular rate and rhythm; No murmurs, rubs, or gallops  ABDOMEN: Soft, Nontender, Nondistended; Bowel sounds present  EXTREMITIES:  2+ Peripheral Pulses, No clubbing, cyanosis, or edema  NEURO/PSYCH: AAOx3, nonfocal  SKIN: No rashes or lesions      LABS:                        14.6   7.51  )-----------( 248      ( 11 Aug 2020 06:05 )             46.9     Hemoglobin: 14.6 g/dL (08-11 @ 06:05)  Hemoglobin: 14.1 g/dL (08-10 @ 06:30)  Hemoglobin: 13.6 g/dL (08-09 @ 06:40)  Hemoglobin: 14.9 g/dL (08-08 @ 10:18)  Hemoglobin: 14.3 g/dL (08-07 @ 05:15)    CBC Full  -  ( 11 Aug 2020 06:05 )  WBC Count : 7.51 K/uL  RBC Count : 4.76 M/uL  Hemoglobin : 14.6 g/dL  Hematocrit : 46.9 %  Platelet Count - Automated : 248 K/uL  Mean Cell Volume : 98.5 fL  Mean Cell Hemoglobin : 30.7 pg  Mean Cell Hemoglobin Concentration : 31.1 %  Auto Neutrophil # : x  Auto Lymphocyte # : x  Auto Monocyte # : x  Auto Eosinophil # : x  Auto Basophil # : x  Auto Neutrophil % : x  Auto Lymphocyte % : x  Auto Monocyte % : x  Auto Eosinophil % : x  Auto Basophil % : x    08-11    141  |  101  |  8   ----------------------------<  93  4.3   |  27  |  0.76    Ca    9.5      11 Aug 2020 06:05  Phos  4.2     08-11  Mg     2.1     08-11      Creatinine Trend: 0.76<--, 0.74<--, 0.84<--, 0.79<--, 0.77<--, 0.81<--        hs Troponin:              CSF:                      EKG:   MICROBIOLOGY:    Culture - Tissue with Gram Stain (collected 11 Aug 2020 00:28)  Source: .Tissue L4-L5 VERTEBRAL DISC SPACE BIOPSY  Gram Stain (11 Aug 2020 04:06):    No polymorphonuclear cells seen per low power field    No organisms seen per oil power field      IMAGING:      Labs, imaging, EKG personally reviewed

## 2020-08-11 NOTE — PROGRESS NOTE ADULT - PROBLEM SELECTOR PLAN 5
DVT - Lovenox 40 subq, held for IR bx. RESUME AFTER BX  Diet - NPO for bx today. CLD after bx for colo tomorrow. NPO at midnight  Activity - Ambulate as tolerated  Dispo: Pending workup DVT - Lovenox 40 subq, held for IR bx. RESUME AFTER BX  Diet - Reg Diet  Activity - Ambulate as tolerated  Dispo: Pending workup  IMPROVE VTE Individual Risk Assessment        RISK                                                          Points  [  ] Previous VTE                                                3  [  ] Thrombophilia                                             2  [  ] Lower limb paralysis                                   2        (unable to hold up >15 seconds)    [  ] Current Cancer                                             2         (within 6 months)  [  ] Immobilization > 24 hrs                              1  [  ] ICU/CCU stay > 24 hours                             1  [x] Age > 60                                                         1  IMPROVE VTE Score:         [1]  Total Risk Factor Score:    0 - 1:   Consider IPC  >2 - 3:  Thromboprophylaxis required (enoxaparin or SQ heparin)        >4:   High Risk: Thromboprophylaxis required (enoxaparin or SQ heparin), optional add IPC DVT - Lovenox 40 subq, held for IR bx. RESUME AFTER BX  Diet - Reg Diet  Activity - Ambulate as tolerated  Dispo: Pending workup  IMPROVE VTE Individual Risk Assessment        RISK                                                          Points  [  ] Previous VTE                                                3  [  ] Thrombophilia                                             2  [  ] Lower limb paralysis                                   2        (unable to hold up >15 seconds)    [  ] Current Cancer                                             2         (within 6 months)  [x  ] Immobilization > 24 hrs                              1  [  ] ICU/CCU stay > 24 hours                             1  [x] Age > 60                                                         1  IMPROVE VTE Score:         [2]  Total Risk Factor Score:    0 - 1:   Consider IPC  >2 - 3:  Thromboprophylaxis required (enoxaparin or SQ heparin)        >4:   High Risk: Thromboprophylaxis required (enoxaparin or SQ heparin), optional add IPC DVT - Lovenox 40 subq  Diet - CLD, NPO at midnight   Activity - Ambulate as tolerated  Dispo: Pending workup  IMPROVE VTE Individual Risk Assessment        RISK                                                          Points  [  ] Previous VTE                                                3  [  ] Thrombophilia                                             2  [  ] Lower limb paralysis                                   2        (unable to hold up >15 seconds)    [  ] Current Cancer                                             2         (within 6 months)  [x  ] Immobilization > 24 hrs                              1  [  ] ICU/CCU stay > 24 hours                             1  [x] Age > 60                                                         1  IMPROVE VTE Score:         [2]  Total Risk Factor Score:    0 - 1:   Consider IPC  >2 - 3:  Thromboprophylaxis required (enoxaparin or SQ heparin)        >4:   High Risk: Thromboprophylaxis required (enoxaparin or SQ heparin), optional add IPC

## 2020-08-11 NOTE — PROGRESS NOTE ADULT - PROBLEM SELECTOR PLAN 3
Incidental finding of b/l lung nodule on CT A/P, confirmed on CT chest  - Pulm agreed on IR biopsy. Less concern for septic emboli given neg BCx  - IR approved for lung biopsy, will do with spinal biopsy Incidental finding of b/l lung nodule on CT A/P, confirmed on CT chest. Less concern for septic emboli given neg BCx  -s/p bx on 8/10.

## 2020-08-11 NOTE — PROGRESS NOTE ADULT - PROBLEM SELECTOR PLAN 2
L4/5 discitis w/ possible abscesses on MR  - Observe off abx and obtain Bcx x 2 per ID. BCx neg  - No current neurological deficits at present, will monitor on neuro checks q4h for now  - IR to do spine bx on 8/10.   -no relief of lower back pain with Tylenol, Toradol, and lidocaine patch. Oxy 5 with good effect.  -Per ID, determine pathogen with IR bx (check path, bacterial cx, fungal, AFB). Would recommend starting vanc 1 g q12 and ertapenem 1g q24 after bx (narrow based on cx results) L4/5 discitis w/ possible abscesses on MR  - BCx neg  - No current neurological deficits at present, will monitor on neuro checks q4h for now  -s/p IR bx 8/10. No orgs found in gram stain. Path, bacterial cx, fungal, AFB pending  -no relief of lower back pain with Tylenol, Toradol, and lidocaine patch. Oxy 5 with good effect.  -Per ID, Started vanc 1 g q12 and ertapenem 1g q24 after bx (narrow based on cx results). Appreciate recs L4/5 discitis w/ possible abscesses on MR  - BCx neg  - No current neurological deficits at present, will monitor on neuro checks q4h for now  -s/p IR bx 8/10. No orgs found in gram stain. Path, bacterial cx, fungal, AFB pending  -no relief of lower back pain with Tylenol, Toradol, and lidocaine patch. Oxy 5 with good effect.  -Per ID, Started vanc 1 g q12 and ertapenem 1g q24 after bx (narrow based on cx results). 1st dose given on 8/10. Will check vanc trough before 4th dose tomorrow am. Appreciate ID recs

## 2020-08-12 ENCOUNTER — RESULT REVIEW (OUTPATIENT)
Age: 75
End: 2020-08-12

## 2020-08-12 LAB
ANION GAP SERPL CALC-SCNC: 15 MMO/L — HIGH (ref 7–14)
BUN SERPL-MCNC: 7 MG/DL — SIGNIFICANT CHANGE UP (ref 7–23)
CALCIUM SERPL-MCNC: 9.3 MG/DL — SIGNIFICANT CHANGE UP (ref 8.4–10.5)
CHLORIDE SERPL-SCNC: 98 MMOL/L — SIGNIFICANT CHANGE UP (ref 98–107)
CO2 SERPL-SCNC: 25 MMOL/L — SIGNIFICANT CHANGE UP (ref 22–31)
CREAT SERPL-MCNC: 0.77 MG/DL — SIGNIFICANT CHANGE UP (ref 0.5–1.3)
GLUCOSE SERPL-MCNC: 96 MG/DL — SIGNIFICANT CHANGE UP (ref 70–99)
HCT VFR BLD CALC: 45.8 % — SIGNIFICANT CHANGE UP (ref 39–50)
HGB BLD-MCNC: 15 G/DL — SIGNIFICANT CHANGE UP (ref 13–17)
MAGNESIUM SERPL-MCNC: 1.9 MG/DL — SIGNIFICANT CHANGE UP (ref 1.6–2.6)
MCHC RBC-ENTMCNC: 30.8 PG — SIGNIFICANT CHANGE UP (ref 27–34)
MCHC RBC-ENTMCNC: 32.8 % — SIGNIFICANT CHANGE UP (ref 32–36)
MCV RBC AUTO: 94 FL — SIGNIFICANT CHANGE UP (ref 80–100)
NRBC # FLD: 0 K/UL — SIGNIFICANT CHANGE UP (ref 0–0)
PHOSPHATE SERPL-MCNC: 2.9 MG/DL — SIGNIFICANT CHANGE UP (ref 2.5–4.5)
PLATELET # BLD AUTO: 276 K/UL — SIGNIFICANT CHANGE UP (ref 150–400)
PMV BLD: 9.9 FL — SIGNIFICANT CHANGE UP (ref 7–13)
POTASSIUM SERPL-MCNC: 3.8 MMOL/L — SIGNIFICANT CHANGE UP (ref 3.5–5.3)
POTASSIUM SERPL-SCNC: 3.8 MMOL/L — SIGNIFICANT CHANGE UP (ref 3.5–5.3)
RBC # BLD: 4.87 M/UL — SIGNIFICANT CHANGE UP (ref 4.2–5.8)
RBC # FLD: 13.7 % — SIGNIFICANT CHANGE UP (ref 10.3–14.5)
SODIUM SERPL-SCNC: 138 MMOL/L — SIGNIFICANT CHANGE UP (ref 135–145)
VANCOMYCIN TROUGH SERPL-MCNC: 11.6 UG/ML — SIGNIFICANT CHANGE UP (ref 10–20)
WBC # BLD: 11.2 K/UL — HIGH (ref 3.8–10.5)
WBC # FLD AUTO: 11.2 K/UL — HIGH (ref 3.8–10.5)

## 2020-08-12 PROCEDURE — 88305 TISSUE EXAM BY PATHOLOGIST: CPT | Mod: 26

## 2020-08-12 PROCEDURE — 99232 SBSQ HOSP IP/OBS MODERATE 35: CPT

## 2020-08-12 PROCEDURE — 45380 COLONOSCOPY AND BIOPSY: CPT | Mod: GC,53

## 2020-08-12 PROCEDURE — 99233 SBSQ HOSP IP/OBS HIGH 50: CPT | Mod: GC

## 2020-08-12 RX ORDER — MULTIVIT WITH MIN/MFOLATE/K2 340-15/3 G
1 POWDER (GRAM) ORAL ONCE
Refills: 0 | Status: DISCONTINUED | OUTPATIENT
Start: 2020-08-12 | End: 2020-08-12

## 2020-08-12 RX ADMIN — OXYCODONE HYDROCHLORIDE 5 MILLIGRAM(S): 5 TABLET ORAL at 12:19

## 2020-08-12 RX ADMIN — ERTAPENEM SODIUM 120 MILLIGRAM(S): 1 INJECTION, POWDER, LYOPHILIZED, FOR SOLUTION INTRAMUSCULAR; INTRAVENOUS at 21:52

## 2020-08-12 RX ADMIN — ZINC OXIDE 1 APPLICATION(S): 200 OINTMENT TOPICAL at 23:27

## 2020-08-12 RX ADMIN — Medication 250 MILLIGRAM(S): at 10:01

## 2020-08-12 RX ADMIN — OXYCODONE HYDROCHLORIDE 5 MILLIGRAM(S): 5 TABLET ORAL at 21:52

## 2020-08-12 RX ADMIN — OXYCODONE HYDROCHLORIDE 5 MILLIGRAM(S): 5 TABLET ORAL at 22:25

## 2020-08-12 RX ADMIN — Medication 250 MILLIGRAM(S): at 23:21

## 2020-08-12 RX ADMIN — ZINC OXIDE 1 APPLICATION(S): 200 OINTMENT TOPICAL at 05:36

## 2020-08-12 NOTE — DIETITIAN INITIAL EVALUATION ADULT. - ETIOLOGY
In the context of chronic [catabolic] illness due to altered GI function with perianal fistula & concern for possible metastatic disease.

## 2020-08-12 NOTE — DIETITIAN INITIAL EVALUATION ADULT. - OTHER INFO
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  Is currently NPO for colonoscopy procedure.  Has been frequently NPO throughout hospitalization.      Reports decrease in appetite with poor PO intake (<75% of norm) for 1 month PTA with resultant 20lb [significant] weight decrease from usual body weight of 185lbs.  Does not consume any nutritional supplements at home.  Has been having loose BMs. Denies N/V, food allergies or chewing/swallowing issues.

## 2020-08-12 NOTE — PROGRESS NOTE ADULT - PROBLEM SELECTOR PLAN 1
Presented with weeks of feculent drainage. 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   - CEA elevated; GI consulted for colonoscopy for malignancy workup, pt amenable to inpatient colo. [place] on 8/12. Diet: CLD. Will start prep tonight, NPO at midnight. Presented with weeks of feculent drainage. 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   - CEA elevated; GI consulted for colonoscopy for malignancy workup, pt amenable to inpatient colo. [place] on 8/12. Diet: NPO for procedure Presented with weeks of feculent drainage. 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   - CEA elevated; GI consulted for colonoscopy for malignancy workup, pt amenable to inpatient colo. [place] on 8/12: Impression: Perianal fistula and ulceration found on perianal exam. Likely malignant partially obstructing tumor in the rectum. Biopsied. Stool in the entire examined colon. No specimens collected. GI will discuss need for repeat colo w/ surg

## 2020-08-12 NOTE — PROGRESS NOTE ADULT - ASSESSMENT
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 Also with pulmonary nodules of unclear etiology. S/p EUA w/ bx by surgery on 8/7. S/p lung/spine biopsy w/ IR on 8/10. Colonoscopy w/ GI pending. 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 Also with pulmonary nodules of unclear etiology. S/p EUA w/ bx by surgery on 8/7. S/p lung/spine biopsy w/ IR on 8/10. Colonoscopy w/ GI scheduled for 8/12. 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 Also with pulmonary nodules of unclear etiology. S/p EUA w/ bx by surgery on 8/7. S/p lung/spine biopsy w/ IR on 8/10. S/p Colonoscopy w/ GI on 8/12.

## 2020-08-12 NOTE — PROGRESS NOTE ADULT - PROBLEM SELECTOR PLAN 5
DVT - Lovenox 40 subq  Diet - CLD, NPO at midnight   Activity - Ambulate as tolerated  Dispo: Pending workup  IMPROVE VTE Individual Risk Assessment        RISK                                                          Points  [  ] Previous VTE                                                3  [  ] Thrombophilia                                             2  [  ] Lower limb paralysis                                   2        (unable to hold up >15 seconds)    [  ] Current Cancer                                             2         (within 6 months)  [x  ] Immobilization > 24 hrs                              1  [  ] ICU/CCU stay > 24 hours                             1  [x] Age > 60                                                         1  IMPROVE VTE Score:         [2]  Total Risk Factor Score:    0 - 1:   Consider IPC  >2 - 3:  Thromboprophylaxis required (enoxaparin or SQ heparin)        >4:   High Risk: Thromboprophylaxis required (enoxaparin or SQ heparin), optional add IPC DVT - Lovenox 40 subq  Diet - NPO for colo  Activity - Ambulate as tolerated  Dispo: Pending workup  IMPROVE VTE Individual Risk Assessment        RISK                                                          Points  [  ] Previous VTE                                                3  [  ] Thrombophilia                                             2  [  ] Lower limb paralysis                                   2        (unable to hold up >15 seconds)    [  ] Current Cancer                                             2         (within 6 months)  [x  ] Immobilization > 24 hrs                              1  [  ] ICU/CCU stay > 24 hours                             1  [x] Age > 60                                                         1  IMPROVE VTE Score:         [2]  Total Risk Factor Score:    0 - 1:   Consider IPC  >2 - 3:  Thromboprophylaxis required (enoxaparin or SQ heparin)        >4:   High Risk: Thromboprophylaxis required (enoxaparin or SQ heparin), optional add IPC DVT - Lovenox 40 subq  Diet - NPO for colo, restart diet after  Activity - Ambulate as tolerated  Dispo: Pending workup  IMPROVE VTE Individual Risk Assessment        RISK                                                          Points  [  ] Previous VTE                                                3  [  ] Thrombophilia                                             2  [  ] Lower limb paralysis                                   2        (unable to hold up >15 seconds)    [  ] Current Cancer                                             2         (within 6 months)  [x  ] Immobilization > 24 hrs                              1  [  ] ICU/CCU stay > 24 hours                             1  [x] Age > 60                                                         1  IMPROVE VTE Score:         [2]  Total Risk Factor Score:    0 - 1:   Consider IPC  >2 - 3:  Thromboprophylaxis required (enoxaparin or SQ heparin)        >4:   High Risk: Thromboprophylaxis required (enoxaparin or SQ heparin), optional add IPC

## 2020-08-12 NOTE — CHART NOTE - NSCHARTNOTEFT_GEN_A_CORE
Reviewed Colonoscopy findings with Dr. Valdez.   Appreciate GI intervention, agree with holding off on full colonoscopy for now.   Patient will require MRI A/P for staging.   Recommend obtaining Medical Oncology Consult.   If patient shows signs of obstruction please do not hesitate to reach out.     [facility] #[id number]

## 2020-08-12 NOTE — DIETITIAN INITIAL EVALUATION ADULT. - PROBLEM SELECTOR PLAN 2
- Seen by general surgery, possible seton placement this admission but no definite plans laid out currently  - Currently the fistula with draining feculent material, no pain, no redness associated  - Will c/w empiric abx, f/u further surgery recommendations

## 2020-08-12 NOTE — CHART NOTE - NSCHARTNOTEFT_GEN_A_CORE
NUTRITION SERVICES     Upon Nutritional Assessment by the Registered Dietitian your patient was determined to meet criteria/ has evidence of the following diagnosis/diagnoses:  [ ] Mild Protein Calorie Malnutrition   [ ] Moderate Protein Calorie Malnutrition   [    X  ] Severe Protein Calorie Malnutrition   [ ] Unspecified Protein Calorie Malnutrition   [ ] Underweight / BMI <19  [ ] Morbid Obesity / BMI >40    Findings as based on:  •  Comprehensive nutritional assessment and consultation    Please refer to Initial Dietitian Evaluation via documents section of [employer] EMR for further recommendations.    Morena Davidson RDN, CDN   pager: [phone]

## 2020-08-12 NOTE — PROGRESS NOTE ADULT - ASSESSMENT
75M with fistula, with additional concern for metastatic cancer of unknown primary origin, now s/p EUS and biopsy on 8/7, and s/p CT guided core needle lung mass biopsy by IR on 8/12. Patient stable and recovering on floor.     PLAN:  - Appreciate GI evaluation will follow up scope and biospy  -Remainder of care per primary team.    [facility]  [id number]

## 2020-08-12 NOTE — PROGRESS NOTE ADULT - PROBLEM SELECTOR PLAN 3
Incidental finding of b/l lung nodule on CT A/P, confirmed on CT chest. Less concern for septic emboli given neg BCx  -s/p bx on 8/10. Incidental finding of b/l lung nodule on CT A/P, confirmed on CT chest. Less concern for septic emboli given neg BCx  -s/p bx on 8/10, reuslts pending

## 2020-08-12 NOTE — PROGRESS NOTE ADULT - SUBJECTIVE AND OBJECTIVE BOX
CC: F/U for OM    Saw/spoke to patient. No fevers, no chills. No new complaints. Unchanged.    Allergies  dust (Rhinitis; Headache)  No Known Drug Allergies    ANTIMICROBIALS:  ertapenem  IVPB 1000 every 24 hours  vancomycin  IVPB 1000 every 12 hours    PE:    Vital Signs Last 24 Hrs  T(C): 36.2 (12 Aug 2020 12:44), Max: 36.9 (11 Aug 2020 15:19)  T(F): 97.2 (12 Aug 2020 12:44), Max: 98.5 (11 Aug 2020 15:19)  HR: 98 (12 Aug 2020 12:44) (56 - 98)  BP: 132/94 (12 Aug 2020 12:44) (118/73 - 141/63)  BP(mean): 105 (12 Aug 2020 12:44) (105 - 105)  RR: 14 (12 Aug 2020 12:44) (14 - 18)  SpO2: 99% (12 Aug 2020 12:44) (96% - 99%)    Gen: AOx3, NAD, non-toxic  CV: S1+S2 normal, nontachycardic  Resp: Clear bilat, no resp distress, no crackles/wheezes  Abd: Soft, nontender, +BS  Ext: No LE edema, no wounds    LABS:                        15.0   11.20 )-----------( 276      ( 12 Aug 2020 07:10 )             45.8     08-12    138  |  98  |  7   ----------------------------<  96  3.8   |  25  |  0.77    Ca    9.3      12 Aug 2020 07:10  Phos  2.9     08-12  Mg     1.9     08-12    MICROBIOLOGY:  Vancomycin Level, Trough: 11.6 ug/mL (08-12-20 @ 09:39)    .Tissue L4-L5 VERTEBRAL DISC SPACE BIOPSY  08-11-20   No growth  --    No polymorphonuclear cells seen per low power field  No organisms seen per oil power field    .Blood Blood-Peripheral  08-06-20   No Growth Final     RADIOLOGY:    CXR:    IMPRESSION:  No pneumothorax seen.    Elevated right hemidiaphragm.    Left lung nodules with a 3 cm left retrocardiac nodule better seen on the CT scan. Additional nodules noted on the CT scan are not able to be visualized.

## 2020-08-12 NOTE — PROGRESS NOTE ADULT - ASSESSMENT
75 M with history of Basal Cell Carcinoma of nose s/p surgical resection with nasal reconstruction who presents to the hospital with complaints of perianal rectal discharge and lower back pain x4 weeks  Leukocytosis, no fever  Feculent perianal discharge and low back pain  CT with concern for possible L1 OM, perianal fistula, pulmonary nodules  Uncertain if active infection, concern that radiographic findings all due to metastatic malignancy  MR with ? suspicion for OM, collection  S/p IR biopsy 8/11 to vertebral lesion  BCXs NGTD  Overall,  1) L1 Spinal lesion/OM  - Possible OM  - Vanco 1g q 12 (monitor levels)--trough 11 is adequate, continue  - Ertapenem 1g q 24  - F/U BCXs, F/U IR biopsy culture  2) Fistula  - Unclear significance of fat stranding  - F/U surgery  - F/U GI for colonoscopy  3) Leukocytosis  - Monitor CBC, monitor for fevers    Champ Segovia MD  Pager [phone]  After 5pm and on weekends call [phone]

## 2020-08-12 NOTE — PROGRESS NOTE ADULT - SUBJECTIVE AND OBJECTIVE BOX
GENERAL SURGERY PROGRESS NOTE TEAM A  ----------------------------------------------------------------------------------    ZARINA GLOVER  |  [id number]   |   75y  |    Male   |   [facility] [id number]   |    Admit:  08-04-20 (8d)    Attending: Soledad Ray      Patient is a 75y old  Male who presents with a chief complaint of Perianal discharge and low back pain x4 weeks (12 Aug 2020 06:33)      ----------------------------------------------------------------------------------    SUBJECTIVE:   Patient seen today during morning rounds at bedside and without acute distress.  24 hour events: None  Overnight: None    OBJECTIVE:  MEDICATIONS  (STANDING):  enoxaparin Injectable 40 milliGRAM(s) SubCutaneous daily  ertapenem  IVPB 1000 milliGRAM(s) IV Intermittent every 24 hours  famotidine    Tablet 20 milliGRAM(s) Oral two times a day  lidocaine   Patch 1 Patch Transdermal daily  loratadine 10 milliGRAM(s) Oral daily  vancomycin  IVPB 1000 milliGRAM(s) IV Intermittent every 12 hours  zinc oxide 20% Ointment 1 Application(s) Topical every 8 hours    MEDICATIONS  (PRN):  acetaminophen   Tablet .. 650 milliGRAM(s) Oral every 6 hours PRN Temp greater or equal to 38C (100.4F), Mild Pain (1 - 3), Moderate Pain (4 - 6)  ketorolac   Injectable 15 milliGRAM(s) IV Push every 6 hours PRN Moderate Pain (4 - 6)  oxyCODONE    IR 5 milliGRAM(s) Oral every 6 hours PRN Moderate Pain (4 - 6)      Allergies    dust (Rhinitis; Headache)  No Known Drug Allergies    Intolerances        PMH:   H/O: Obesity  Basal Cell Cancer; Right side distal nose  Seasonal Allergies  H/O skin graft  Basal Cell Carcinoma of Anterior Chest; s/p MOHS surgery 8/10 proximal to suprasternal notch      Vitals:  Vital Signs Last 24 Hrs  T(C): 36.3 (12 Aug 2020 05:35), Max: 36.9 (11 Aug 2020 15:19)  T(F): 97.3 (12 Aug 2020 05:35), Max: 98.5 (11 Aug 2020 15:19)  HR: 76 (12 Aug 2020 05:35) (56 - 76)  BP: 118/73 (12 Aug 2020 05:35) (118/73 - 141/63)  BP(mean): --  RR: 17 (12 Aug 2020 05:35) (17 - 18)  SpO2: 96% (12 Aug 2020 05:35) (96% - 97%)      Physical Exam:  Constitutional: resting in bed with no acute distress  Neuro: AAOx3  Respiratory:  unlabored breathing  Gastrointestinal: Abdomen soft, non distended, non tenderness  Extremities:  No edema, no calf tenderness  Skin: Non-jaundiced, no cyanosis or rash observed    LABS:  ----------  LABORATORY DATA:  ----------                        15.0   11.20 )-----------( 276      ( 12 Aug 2020 07:10 )             45.8               08-12    138  |  98  |  7   ----------------------------<  96  3.8   |  25  |  0.77    Ca    9.3      12 Aug 2020 07:10  Phos  2.9     08-12  Mg     1.9     08-12      ----------  RADIOGRAPHIC DATA:  ---------  PACS Image: Image(s) Available (08-10-20 @ 19:27)  PACS Image: Image(s) Available (08-10-20 @ 18:11)      Microbiology:    Culture - Acid Fast - Tissue w/Smear (collected 08-11-20)  Source: .Tissue L4-L5 VERTEBRAL DISC SPACE BIOPS    Culture - Fungal, Tissue (collected 08-11-20)  Source: .Tissue L4-L5 VERTEBRAL DISC SPACE BIOPS  Preliminary Report:    Testing in progress    Culture - Tissue with Gram Stain (collected 08-11-20)  Source: .Tissue L4-L5 VERTEBRAL DISC SPACE BIOPSY  Gram Stain:    No polymorphonuclear cells seen per low power field    No organisms seen per oil power field  Preliminary Report:    No growth

## 2020-08-12 NOTE — DIETITIAN INITIAL EVALUATION ADULT. - PERTINENT MEDS FT
MEDICATIONS  (STANDING):  enoxaparin Injectable 40 milliGRAM(s) SubCutaneous daily  ertapenem  IVPB 1000 milliGRAM(s) IV Intermittent every 24 hours  famotidine    Tablet 20 milliGRAM(s) Oral two times a day  lidocaine   Patch 1 Patch Transdermal daily  loratadine 10 milliGRAM(s) Oral daily  vancomycin  IVPB 1000 milliGRAM(s) IV Intermittent every 12 hours  zinc oxide 20% Ointment 1 Application(s) Topical every 8 hours

## 2020-08-12 NOTE — PROGRESS NOTE ADULT - ATTENDING COMMENTS
GI prep-overnight.  Rectal pathology made clearance difficult.  GI aware.  C-scope today.  F/u Cx's and path of spinal lesion and path of lung lesion.  F/u path from EUA by surgery.  Additional bx today by GI if able.

## 2020-08-12 NOTE — PROGRESS NOTE ADULT - SUBJECTIVE AND OBJECTIVE BOX
Internal Medicine Progress Note    =======================================================  CONTACT KENYATTA Wolf M.D., PGY-1  Pager:  [phone] ([facility])  =======================================================    Patient is a 75y old  Male who presents with a chief complaint of Perianal discharge and low back pain x4 weeks (11 Aug 2020 11:18)      SUBJECTIVE / OVERNIGHT EVENTS:  NAEO.      MEDICATIONS  (STANDING):  enoxaparin Injectable 40 milliGRAM(s) SubCutaneous daily  ertapenem  IVPB 1000 milliGRAM(s) IV Intermittent every 24 hours  famotidine    Tablet 20 milliGRAM(s) Oral two times a day  lidocaine   Patch 1 Patch Transdermal daily  loratadine 10 milliGRAM(s) Oral daily  vancomycin  IVPB 1000 milliGRAM(s) IV Intermittent every 12 hours  zinc oxide 20% Ointment 1 Application(s) Topical every 8 hours    MEDICATIONS  (PRN):  acetaminophen   Tablet .. 650 milliGRAM(s) Oral every 6 hours PRN Temp greater or equal to 38C (100.4F), Mild Pain (1 - 3), Moderate Pain (4 - 6)  ketorolac   Injectable 15 milliGRAM(s) IV Push every 6 hours PRN Moderate Pain (4 - 6)  oxyCODONE    IR 5 milliGRAM(s) Oral every 6 hours PRN Moderate Pain (4 - 6)    Vital Signs Last 24 Hrs  T(C): 36.3 (12 Aug 2020 05:35), Max: 36.9 (11 Aug 2020 15:19)  T(F): 97.3 (12 Aug 2020 05:35), Max: 98.5 (11 Aug 2020 15:19)  HR: 76 (12 Aug 2020 05:35) (56 - 76)  BP: 118/73 (12 Aug 2020 05:35) (118/73 - 141/63)  BP(mean): --  RR: 17 (12 Aug 2020 05:35) (17 - 18)  SpO2: 96% (12 Aug 2020 05:35) (96% - 97%)    CAPILLARY BLOOD GLUCOSE        I&O's Summary    10 Aug 2020 07:01  -  11 Aug 2020 07:00  --------------------------------------------------------  IN: 0 mL / OUT: 700 mL / NET: -700 mL    11 Aug 2020 07:01  -  12 Aug 2020 06:34  --------------------------------------------------------  IN: 480 mL / OUT: 400 mL / NET: 80 mL        PHYSICAL EXAM  GENERAL: NAD  HEAD:  Atraumatic  EYES: EOMI, PERRLA, conjunctiva and sclera clear  NECK: Supple, No JVD  CHEST/LUNG: Clear to auscultation bilaterally; No wheeze  HEART: Regular rate and rhythm; No murmurs, rubs, or gallops  ABDOMEN: Soft, Nontender, Nondistended; Bowel sounds present  EXTREMITIES:  2+ Peripheral Pulses, No clubbing, cyanosis, or edema  NEURO/PSYCH: AAOx3, nonfocal  SKIN: No rashes or lesions      LABS:                        14.6   7.51  )-----------( 248      ( 11 Aug 2020 06:05 )             46.9     Hemoglobin: 14.6 g/dL (08-11 @ 06:05)  Hemoglobin: 14.1 g/dL (08-10 @ 06:30)  Hemoglobin: 13.6 g/dL (08-09 @ 06:40)  Hemoglobin: 14.9 g/dL (08-08 @ 10:18)    CBC Full  -  ( 11 Aug 2020 06:05 )  WBC Count : 7.51 K/uL  RBC Count : 4.76 M/uL  Hemoglobin : 14.6 g/dL  Hematocrit : 46.9 %  Platelet Count - Automated : 248 K/uL  Mean Cell Volume : 98.5 fL  Mean Cell Hemoglobin : 30.7 pg  Mean Cell Hemoglobin Concentration : 31.1 %  Auto Neutrophil # : x  Auto Lymphocyte # : x  Auto Monocyte # : x  Auto Eosinophil # : x  Auto Basophil # : x  Auto Neutrophil % : x  Auto Lymphocyte % : x  Auto Monocyte % : x  Auto Eosinophil % : x  Auto Basophil % : x    08-11    141  |  101  |  8   ----------------------------<  93  4.3   |  27  |  0.76    Ca    9.5      11 Aug 2020 06:05  Phos  4.2     08-11  Mg     2.1     08-11      Creatinine Trend: 0.76<--, 0.74<--, 0.84<--, 0.79<--, 0.77<--, 0.81<--        hs Troponin:              CSF:                      EKG:   MICROBIOLOGY:    Culture - Acid Fast - Tissue w/Smear (collected 11 Aug 2020 00:28)  Source: .Tissue L4-L5 VERTEBRAL DISC SPACE BIOPS    Culture - Fungal, Tissue (collected 11 Aug 2020 00:28)  Source: .Tissue L4-L5 VERTEBRAL DISC SPACE BIOPS  Preliminary Report (11 Aug 2020 07:54):    Testing in progress    Culture - Tissue with Gram Stain (collected 11 Aug 2020 00:28)  Source: .Tissue L4-L5 VERTEBRAL DISC SPACE BIOPSY  Gram Stain (11 Aug 2020 04:06):    No polymorphonuclear cells seen per low power field    No organisms seen per oil power field  Preliminary Report (11 Aug 2020 21:12):    No growth      IMAGING:      Labs, imaging, EKG personally reviewed Internal Medicine Progress Note    =======================================================  CONTACT KENYATTA Wolf M.D., PGY-1  Pager:  [phone] ([facility])  =======================================================    Patient is a 75y old  Male who presents with a chief complaint of Perianal discharge and low back pain x4 weeks (11 Aug 2020 11:18)      SUBJECTIVE / OVERNIGHT EVENTS:  NAEO.    Pt states feeling "worn out" this morning due to the prep for colo. No acute complaints at this time.      MEDICATIONS  (STANDING):  enoxaparin Injectable 40 milliGRAM(s) SubCutaneous daily  ertapenem  IVPB 1000 milliGRAM(s) IV Intermittent every 24 hours  famotidine    Tablet 20 milliGRAM(s) Oral two times a day  lidocaine   Patch 1 Patch Transdermal daily  loratadine 10 milliGRAM(s) Oral daily  vancomycin  IVPB 1000 milliGRAM(s) IV Intermittent every 12 hours  zinc oxide 20% Ointment 1 Application(s) Topical every 8 hours    MEDICATIONS  (PRN):  acetaminophen   Tablet .. 650 milliGRAM(s) Oral every 6 hours PRN Temp greater or equal to 38C (100.4F), Mild Pain (1 - 3), Moderate Pain (4 - 6)  ketorolac   Injectable 15 milliGRAM(s) IV Push every 6 hours PRN Moderate Pain (4 - 6)  oxyCODONE    IR 5 milliGRAM(s) Oral every 6 hours PRN Moderate Pain (4 - 6)    Vital Signs Last 24 Hrs  T(C): 36.3 (12 Aug 2020 05:35), Max: 36.9 (11 Aug 2020 15:19)  T(F): 97.3 (12 Aug 2020 05:35), Max: 98.5 (11 Aug 2020 15:19)  HR: 76 (12 Aug 2020 05:35) (56 - 76)  BP: 118/73 (12 Aug 2020 05:35) (118/73 - 141/63)  BP(mean): --  RR: 17 (12 Aug 2020 05:35) (17 - 18)  SpO2: 96% (12 Aug 2020 05:35) (96% - 97%)    CAPILLARY BLOOD GLUCOSE        I&O's Summary    10 Aug 2020 07:01  -  11 Aug 2020 07:00  --------------------------------------------------------  IN: 0 mL / OUT: 700 mL / NET: -700 mL    11 Aug 2020 07:01  -  12 Aug 2020 06:34  --------------------------------------------------------  IN: 480 mL / OUT: 400 mL / NET: 80 mL        PHYSICAL EXAM  GENERAL: NAD  HEAD:  Atraumatic  EYES: EOMI, PERRLA, conjunctiva and sclera clear  NECK: Supple, No JVD  CHEST/LUNG: Clear to auscultation bilaterally; No wheeze  HEART: Regular rate and rhythm; No murmurs, rubs, or gallops  ABDOMEN: Soft, Nontender, Nondistended; Bowel sounds present  EXTREMITIES:  2+ Peripheral Pulses, No clubbing, cyanosis, or edema  NEURO/PSYCH: AAOx3, nonfocal  SKIN: No rashes or lesions      LABS:                        14.6   7.51  )-----------( 248      ( 11 Aug 2020 06:05 )             46.9     Hemoglobin: 14.6 g/dL (08-11 @ 06:05)  Hemoglobin: 14.1 g/dL (08-10 @ 06:30)  Hemoglobin: 13.6 g/dL (08-09 @ 06:40)  Hemoglobin: 14.9 g/dL (08-08 @ 10:18)    CBC Full  -  ( 11 Aug 2020 06:05 )  WBC Count : 7.51 K/uL  RBC Count : 4.76 M/uL  Hemoglobin : 14.6 g/dL  Hematocrit : 46.9 %  Platelet Count - Automated : 248 K/uL  Mean Cell Volume : 98.5 fL  Mean Cell Hemoglobin : 30.7 pg  Mean Cell Hemoglobin Concentration : 31.1 %  Auto Neutrophil # : x  Auto Lymphocyte # : x  Auto Monocyte # : x  Auto Eosinophil # : x  Auto Basophil # : x  Auto Neutrophil % : x  Auto Lymphocyte % : x  Auto Monocyte % : x  Auto Eosinophil % : x  Auto Basophil % : x    08-11    141  |  101  |  8   ----------------------------<  93  4.3   |  27  |  0.76    Ca    9.5      11 Aug 2020 06:05  Phos  4.2     08-11  Mg     2.1     08-11      Creatinine Trend: 0.76<--, 0.74<--, 0.84<--, 0.79<--, 0.77<--, 0.81<--        hs Troponin:              CSF:                      EKG:   MICROBIOLOGY:    Culture - Acid Fast - Tissue w/Smear (collected 11 Aug 2020 00:28)  Source: .Tissue L4-L5 VERTEBRAL DISC SPACE BIOPS    Culture - Fungal, Tissue (collected 11 Aug 2020 00:28)  Source: .Tissue L4-L5 VERTEBRAL DISC SPACE BIOPS  Preliminary Report (11 Aug 2020 07:54):    Testing in progress    Culture - Tissue with Gram Stain (collected 11 Aug 2020 00:28)  Source: .Tissue L4-L5 VERTEBRAL DISC SPACE BIOPSY  Gram Stain (11 Aug 2020 04:06):    No polymorphonuclear cells seen per low power field    No organisms seen per oil power field  Preliminary Report (11 Aug 2020 21:12):    No growth      IMAGING:      Labs, imaging, EKG personally reviewed

## 2020-08-12 NOTE — PROGRESS NOTE ADULT - PROBLEM SELECTOR PLAN 2
L4/5 discitis w/ possible abscesses on MR  - BCx neg  - No current neurological deficits at present, will monitor on neuro checks q4h for now  -s/p IR bx 8/10. No orgs found in gram stain. Path, bacterial cx, fungal, AFB pending  -no relief of lower back pain with Tylenol, Toradol, and lidocaine patch. Oxy 5 with good effect.  -Per ID, Started vanc 1 g q12 and ertapenem 1g q24 after bx (narrow based on cx results). 1st dose given on 8/10. Will check vanc trough before 4th dose tomorrow am. Appreciate ID recs L4/5 discitis w/ possible abscesses on MR  - BCx NG.  -s/p IR bx 8/10. . L4-L5 tissue bx with NG. No orgs found in gram stain. Path pending.  - No current neurological deficits at present, will monitor on neuro checks q4h for now  -no relief of lower back pain with Tylenol, Toradol, and lidocaine patch. Oxy 5 with good effect.  -Per ID, Started vanc 1 g q12 and ertapenem 1g q24 after bx (narrow based on cx results). 1st dose given on 8/10. Will check vanc trough before 4th dose today am. Appreciate ID recs L4/5 discitis w/ possible abscesses on MR  - BCx NG.  -s/p IR bx 8/10. . L4-L5 tissue bx with NG. No orgs found in gram stain. Path pending.  - No current neurological deficits at present, will monitor on neuro checks q4h for now  -no relief of lower back pain with Tylenol, Toradol, and lidocaine patch. Oxy 5 with good effect.  -Per ID, Started vanc 1 g q12 and ertapenem 1g q24 after bx (narrow based on cx results). 1st dose given on 8/10. vanc trough appropriate. Appreciate ID recs L4/5 discitis w/ possible abscesses on MR  - BCx NG.  -s/p IR bx 8/10. L4-L5 tissue bx with NG. No orgs found in gram stain. Path pending.  - No current neurological deficits at present, will monitor on neuro checks q4h for now  -no relief of lower back pain with Tylenol, Toradol, and lidocaine patch. Oxy 5 with good effect.  -Per ID, Started vanc 1 g q12 and ertapenem 1g q24 after bx (narrow based on cx results). 1st dose given on 8/10. vanc trough appropriate. Appreciate ID recs

## 2020-08-12 NOTE — DIETITIAN INITIAL EVALUATION ADULT. - PROBLEM SELECTOR PLAN 1
- Symptoms started around the same time patient's perianal discharge started, unclear if related to perianal fistula noted on CT or separate -> will obtain MRI lumbar spine for further evaluation  - Will c/w empiric abx at this time  - No current neurological deficits at present, will monitor on neuro checks q4h for now  - Consider spine eval in AM

## 2020-08-13 ENCOUNTER — TRANSCRIPTION ENCOUNTER (OUTPATIENT)
Age: 75
End: 2020-08-13

## 2020-08-13 DIAGNOSIS — D72.829 ELEVATED WHITE BLOOD CELL COUNT, UNSPECIFIED: ICD-10-CM

## 2020-08-13 DIAGNOSIS — K62.89 OTHER SPECIFIED DISEASES OF ANUS AND RECTUM: ICD-10-CM

## 2020-08-13 LAB
ANION GAP SERPL CALC-SCNC: 12 MMO/L — SIGNIFICANT CHANGE UP (ref 7–14)
BUN SERPL-MCNC: 7 MG/DL — SIGNIFICANT CHANGE UP (ref 7–23)
CALCIUM SERPL-MCNC: 9.2 MG/DL — SIGNIFICANT CHANGE UP (ref 8.4–10.5)
CHLORIDE SERPL-SCNC: 102 MMOL/L — SIGNIFICANT CHANGE UP (ref 98–107)
CO2 SERPL-SCNC: 25 MMOL/L — SIGNIFICANT CHANGE UP (ref 22–31)
CREAT SERPL-MCNC: 0.75 MG/DL — SIGNIFICANT CHANGE UP (ref 0.5–1.3)
GLUCOSE SERPL-MCNC: 98 MG/DL — SIGNIFICANT CHANGE UP (ref 70–99)
HCT VFR BLD CALC: 42.9 % — SIGNIFICANT CHANGE UP (ref 39–50)
HGB BLD-MCNC: 14.3 G/DL — SIGNIFICANT CHANGE UP (ref 13–17)
MAGNESIUM SERPL-MCNC: 1.9 MG/DL — SIGNIFICANT CHANGE UP (ref 1.6–2.6)
MCHC RBC-ENTMCNC: 31.8 PG — SIGNIFICANT CHANGE UP (ref 27–34)
MCHC RBC-ENTMCNC: 33.3 % — SIGNIFICANT CHANGE UP (ref 32–36)
MCV RBC AUTO: 95.5 FL — SIGNIFICANT CHANGE UP (ref 80–100)
NRBC # FLD: 0 K/UL — SIGNIFICANT CHANGE UP (ref 0–0)
PHOSPHATE SERPL-MCNC: 3.5 MG/DL — SIGNIFICANT CHANGE UP (ref 2.5–4.5)
PLATELET # BLD AUTO: 259 K/UL — SIGNIFICANT CHANGE UP (ref 150–400)
PMV BLD: 10.2 FL — SIGNIFICANT CHANGE UP (ref 7–13)
POTASSIUM SERPL-MCNC: 4 MMOL/L — SIGNIFICANT CHANGE UP (ref 3.5–5.3)
POTASSIUM SERPL-SCNC: 4 MMOL/L — SIGNIFICANT CHANGE UP (ref 3.5–5.3)
RBC # BLD: 4.49 M/UL — SIGNIFICANT CHANGE UP (ref 4.2–5.8)
RBC # FLD: 14 % — SIGNIFICANT CHANGE UP (ref 10.3–14.5)
SODIUM SERPL-SCNC: 139 MMOL/L — SIGNIFICANT CHANGE UP (ref 135–145)
WBC # BLD: 7.3 K/UL — SIGNIFICANT CHANGE UP (ref 3.8–10.5)
WBC # FLD AUTO: 7.3 K/UL — SIGNIFICANT CHANGE UP (ref 3.8–10.5)

## 2020-08-13 PROCEDURE — 99232 SBSQ HOSP IP/OBS MODERATE 35: CPT

## 2020-08-13 PROCEDURE — 93010 ELECTROCARDIOGRAM REPORT: CPT

## 2020-08-13 PROCEDURE — 99232 SBSQ HOSP IP/OBS MODERATE 35: CPT | Mod: GC

## 2020-08-13 PROCEDURE — 99233 SBSQ HOSP IP/OBS HIGH 50: CPT | Mod: GC

## 2020-08-13 RX ORDER — SODIUM CHLORIDE 9 MG/ML
1000 INJECTION, SOLUTION INTRAVENOUS
Refills: 0 | Status: DISCONTINUED | OUTPATIENT
Start: 2020-08-13 | End: 2020-08-14

## 2020-08-13 RX ORDER — POLYETHYLENE GLYCOL 3350 17 G/17G
17 POWDER, FOR SOLUTION ORAL
Refills: 0 | Status: COMPLETED | OUTPATIENT
Start: 2020-08-13 | End: 2020-08-13

## 2020-08-13 RX ORDER — NEOMYCIN SULFATE 500 MG/1
500 TABLET ORAL
Refills: 0 | Status: COMPLETED | OUTPATIENT
Start: 2020-08-13 | End: 2020-08-14

## 2020-08-13 RX ORDER — OXYCODONE HYDROCHLORIDE 5 MG/1
5 TABLET ORAL EVERY 6 HOURS
Refills: 0 | Status: DISCONTINUED | OUTPATIENT
Start: 2020-08-13 | End: 2020-08-14

## 2020-08-13 RX ORDER — METRONIDAZOLE 500 MG
250 TABLET ORAL
Refills: 0 | Status: COMPLETED | OUTPATIENT
Start: 2020-08-13 | End: 2020-08-14

## 2020-08-13 RX ORDER — OXYCODONE HYDROCHLORIDE 5 MG/1
5 TABLET ORAL ONCE
Refills: 0 | Status: DISCONTINUED | OUTPATIENT
Start: 2020-08-13 | End: 2020-08-13

## 2020-08-13 RX ADMIN — POLYETHYLENE GLYCOL 3350 17 GRAM(S): 17 POWDER, FOR SOLUTION ORAL at 17:46

## 2020-08-13 RX ADMIN — OXYCODONE HYDROCHLORIDE 5 MILLIGRAM(S): 5 TABLET ORAL at 09:49

## 2020-08-13 RX ADMIN — ZINC OXIDE 1 APPLICATION(S): 200 OINTMENT TOPICAL at 21:49

## 2020-08-13 RX ADMIN — Medication 250 MILLIGRAM(S): at 12:40

## 2020-08-13 RX ADMIN — POLYETHYLENE GLYCOL 3350 17 GRAM(S): 17 POWDER, FOR SOLUTION ORAL at 16:43

## 2020-08-13 RX ADMIN — POLYETHYLENE GLYCOL 3350 17 GRAM(S): 17 POWDER, FOR SOLUTION ORAL at 15:50

## 2020-08-13 RX ADMIN — OXYCODONE HYDROCHLORIDE 5 MILLIGRAM(S): 5 TABLET ORAL at 16:30

## 2020-08-13 RX ADMIN — OXYCODONE HYDROCHLORIDE 5 MILLIGRAM(S): 5 TABLET ORAL at 17:45

## 2020-08-13 RX ADMIN — ENOXAPARIN SODIUM 40 MILLIGRAM(S): 100 INJECTION SUBCUTANEOUS at 11:31

## 2020-08-13 RX ADMIN — NEOMYCIN SULFATE 500 MILLIGRAM(S): 500 TABLET ORAL at 19:11

## 2020-08-13 RX ADMIN — OXYCODONE HYDROCHLORIDE 5 MILLIGRAM(S): 5 TABLET ORAL at 10:30

## 2020-08-13 RX ADMIN — ERTAPENEM SODIUM 120 MILLIGRAM(S): 1 INJECTION, POWDER, LYOPHILIZED, FOR SOLUTION INTRAMUSCULAR; INTRAVENOUS at 21:14

## 2020-08-13 RX ADMIN — OXYCODONE HYDROCHLORIDE 5 MILLIGRAM(S): 5 TABLET ORAL at 18:30

## 2020-08-13 RX ADMIN — POLYETHYLENE GLYCOL 3350 17 GRAM(S): 17 POWDER, FOR SOLUTION ORAL at 13:34

## 2020-08-13 RX ADMIN — ZINC OXIDE 1 APPLICATION(S): 200 OINTMENT TOPICAL at 05:54

## 2020-08-13 RX ADMIN — SODIUM CHLORIDE 125 MILLILITER(S): 9 INJECTION, SOLUTION INTRAVENOUS at 23:10

## 2020-08-13 RX ADMIN — POLYETHYLENE GLYCOL 3350 17 GRAM(S): 17 POWDER, FOR SOLUTION ORAL at 11:31

## 2020-08-13 RX ADMIN — POLYETHYLENE GLYCOL 3350 17 GRAM(S): 17 POWDER, FOR SOLUTION ORAL at 19:10

## 2020-08-13 RX ADMIN — NEOMYCIN SULFATE 500 MILLIGRAM(S): 500 TABLET ORAL at 12:48

## 2020-08-13 RX ADMIN — POLYETHYLENE GLYCOL 3350 17 GRAM(S): 17 POWDER, FOR SOLUTION ORAL at 14:33

## 2020-08-13 RX ADMIN — Medication 250 MILLIGRAM(S): at 19:11

## 2020-08-13 RX ADMIN — Medication 250 MILLIGRAM(S): at 11:08

## 2020-08-13 RX ADMIN — Medication 250 MILLIGRAM(S): at 21:48

## 2020-08-13 RX ADMIN — POLYETHYLENE GLYCOL 3350 17 GRAM(S): 17 POWDER, FOR SOLUTION ORAL at 12:42

## 2020-08-13 RX ADMIN — SODIUM CHLORIDE 125 MILLILITER(S): 9 INJECTION, SOLUTION INTRAVENOUS at 14:34

## 2020-08-13 RX ADMIN — ZINC OXIDE 1 APPLICATION(S): 200 OINTMENT TOPICAL at 14:35

## 2020-08-13 RX ADMIN — OXYCODONE HYDROCHLORIDE 5 MILLIGRAM(S): 5 TABLET ORAL at 15:58

## 2020-08-13 NOTE — CONSULT NOTE ADULT - CONSULT REQUESTED DATE/TIME
04-Aug-2020 19:31
06-Aug-2020
06-Aug-2020 14:56
06-Aug-2020 15:51
13-Aug-2020 11:48
05-Aug-2020 12:19

## 2020-08-13 NOTE — PROGRESS NOTE ADULT - ASSESSMENT
74 y/o M w/ Hx BCC s/p resection who p/w rectal discharge and pain x 4 weeks. GI consulted for colonoscopy to r/o malignancy in the setting of complex perianal fistula.       Impression:   #Perianal fistula - with rectal mass concerning for malignancy seen on colonoscopy. Biopsied.     Recommendations:   - F/u rectal, lung nodule biopsies  - Consider oncology consult  - Per surgery no plan for repeat colonoscopy at this point  - Malignancy work up per primary team  - Advance diet as tolerated    GI will sign off. Please don't hesitate to reach out with any question.    Johnathon Calderon, PGY-4  GI Fellow    Available on [employer] Teams  Pager [phone] ([facility]) or [phone] ([facility])  After 5PM/Weekends, please contact the on-call GI fellow: [phone] 76 y/o M w/ Hx BCC s/p resection who p/w rectal discharge and pain x 4 weeks. GI consulted for colonoscopy to r/o malignancy in the setting of complex perianal fistula.       Impression:   #Perianal fistula - with rectal mass concerning for malignancy seen on colonoscopy. Concern for mets given lung findings on CT. Rectal mass biopsied.     Recommendations:   - F/u rectal, lung nodule biopsies  - Consider oncology consult  - As discussed with colorectal surgery, no need for repeat colonoscopy at this point  - Malignancy work up per primary team  - Advance diet as tolerated    GI will sign off. Please don't hesitate to reach out with any question.    Johnathon Calderon, PGY-4  GI Fellow    Available on [employer] Teams  Pager [phone] ([facility]) or [phone] ([facility])  After 5PM/Weekends, please contact the on-call GI fellow: [phone]

## 2020-08-13 NOTE — PROGRESS NOTE ADULT - SUBJECTIVE AND OBJECTIVE BOX
CC: F/U for OM    Saw/spoke to patient. No fevers, no chills. No new complaints. Planning for OR currently.    Allergies  dust (Rhinitis; Headache)  No Known Drug Allergies    ANTIMICROBIALS:  ertapenem  IVPB 1000 every 24 hours  metroNIDAZOLE    Tablet 250 <User Schedule>  neomycin 500 <User Schedule>  vancomycin  IVPB 1000 every 12 hours    PE:    Vital Signs Last 24 Hrs  T(C): 36.3 (13 Aug 2020 05:50), Max: 36.9 (12 Aug 2020 21:36)  T(F): 97.3 (13 Aug 2020 05:50), Max: 98.4 (12 Aug 2020 21:36)  HR: 72 (13 Aug 2020 05:50) (72 - 98)  BP: 122/80 (13 Aug 2020 05:50) (95/68 - 136/70)  BP(mean): 81 (12 Aug 2020 14:30) (81 - 105)  RR: 18 (13 Aug 2020 05:50) (14 - 19)  SpO2: 97% (13 Aug 2020 05:50) (95% - 99%)    Gen: AOx3, NAD, non-toxic  CV: S1+S2 normal, nontachycardic  Resp: Clear bilat, no resp distress, no crackles/wheezes  Abd: Soft, nontender, +BS  Ext: No LE edema, no wounds    LABS:                        14.3   7.30  )-----------( 259      ( 13 Aug 2020 06:30 )             42.9     08-13    139  |  102  |  7   ----------------------------<  98  4.0   |  25  |  0.75    Ca    9.2      13 Aug 2020 06:30  Phos  3.5     08-13  Mg     1.9     08-13    MICROBIOLOGY:    .Tissue L4-L5 VERTEBRAL DISC SPACE BIOPSY  08-11-20   No growth  --    No polymorphonuclear cells seen per low power field  No organisms seen per oil power field    .Blood Blood-Peripheral  08-06-20   No Growth Final     (otherwise reviewed)  RADIOLOGY:    8/10 XR:    IMPRESSION:  No pneumothorax seen.    Elevated right hemidiaphragm.    Left lung nodules with a 3 cm left retrocardiac nodule better seen on the CT scan. Additional nodules noted on the CT scan are not able to be visualized.

## 2020-08-13 NOTE — CONSULT NOTE ADULT - PROBLEM SELECTOR RECOMMENDATION 2
CT 08/05: multiple nodules Right LL & Left UL/LL c/o mets  - f/u results LLL lung mass core needle bx

## 2020-08-13 NOTE — CHART NOTE - NSCHARTNOTEFT_GEN_A_CORE
Preop Dx: Rectal mass  Surgeon: Dr. Valdez  Procedure: Laparoscopic possible open Sigmoid Colostomy     Vital Signs Last 24 Hrs  T(C): 36.8 (13 Aug 2020 14:47), Max: 36.9 (12 Aug 2020 21:36)  T(F): 98.2 (13 Aug 2020 14:47), Max: 98.4 (12 Aug 2020 21:36)  HR: 70 (13 Aug 2020 14:47) (70 - 91)  BP: 120/81 (13 Aug 2020 14:47) (120/81 - 126/77)  BP(mean): --  RR: 16 (13 Aug 2020 14:47) (16 - 18)  SpO2: 99% (13 Aug 2020 14:47) (97% - 99%)                        14.3   7.30  )-----------( 259      ( 13 Aug 2020 06:30 )             42.9     08-13    139  |  102  |  7   ----------------------------<  98  4.0   |  25  |  0.75    Ca    9.2      13 Aug 2020 06:30  Phos  3.5     08-13  Mg     1.9     08-13        Daily     Daily     EKG:  CXR:   Type and Screen:         A/P: 75y Male     - OR 8/14 for lap possible open sigmoid colostomy with Dr. Valdez  - NPO past midnight, except medications  - IVF while NPO  - Consent signed and in chart  - Medical clearance for OR    [id number]  A Team

## 2020-08-13 NOTE — PROGRESS NOTE ADULT - PROBLEM SELECTOR PLAN 3
Incidental finding of b/l lung nodule on CT A/P, confirmed on CT chest. Less concern for septic emboli given neg BCx  -s/p bx on 8/10, results pending

## 2020-08-13 NOTE — PROGRESS NOTE ADULT - SUBJECTIVE AND OBJECTIVE BOX
Surgery Progress Note     Subjective/24hour Events:   Patient seen and examined.   Yesterday for colonoscopy.   No complaints today.     Vital Signs:  Vital Signs Last 24 Hrs  T(C): 36.3 (13 Aug 2020 05:50), Max: 36.9 (12 Aug 2020 21:36)  T(F): 97.3 (13 Aug 2020 05:50), Max: 98.4 (12 Aug 2020 21:36)  HR: 72 (13 Aug 2020 05:50) (72 - 98)  BP: 122/80 (13 Aug 2020 05:50) (95/68 - 136/70)  BP(mean): 81 (12 Aug 2020 14:30) (81 - 105)  RR: 18 (13 Aug 2020 05:50) (14 - 19)  SpO2: 97% (13 Aug 2020 05:50) (95% - 99%)    CAPILLARY BLOOD GLUCOSE          I&O's Detail      MEDICATIONS  (STANDING):  enoxaparin Injectable 40 milliGRAM(s) SubCutaneous daily  ertapenem  IVPB 1000 milliGRAM(s) IV Intermittent every 24 hours  famotidine    Tablet 20 milliGRAM(s) Oral two times a day  lidocaine   Patch 1 Patch Transdermal daily  loratadine 10 milliGRAM(s) Oral daily  vancomycin  IVPB 1000 milliGRAM(s) IV Intermittent every 12 hours  zinc oxide 20% Ointment 1 Application(s) Topical every 8 hours    MEDICATIONS  (PRN):  acetaminophen   Tablet .. 650 milliGRAM(s) Oral every 6 hours PRN Temp greater or equal to 38C (100.4F), Mild Pain (1 - 3), Moderate Pain (4 - 6)  oxyCODONE    IR 5 milliGRAM(s) Oral every 6 hours PRN Moderate Pain (4 - 6)      Physical Exam:  Gen: NAD.  Lungs: Non labored breathing.   Ab: Soft, nontender, nondistended.   Ext: Moves all 4 spontaneously.     Labs:    08-13    139  |  102  |  7   ----------------------------<  98  4.0   |  25  |  0.75    Ca    9.2      13 Aug 2020 06:30  Phos  3.5     08-13  Mg     1.9     08-13                              14.3   7.30  )-----------( 259      ( 13 Aug 2020 06:30 )             42.9         Imaging:    < from: Colonoscopy (08.12.20 @ 12:53) >  Findings:       The perianal exam findings included perianal fistulas and ulcerations. A        mass and fullness was felt upon digital rectal exam.       A large infiltrative, friable, partially obstructing mass was found in        the rectum. The mass was circumferential and extended from the anal        verge to approximately 17 cm from the anus. There was significant        luminal narrowing at the proximal margin of the lesion. The colonoscope        could not traverse this lesion; the colonoscope was withdrawn and an        endoscope was inserted withsuccessful traversal. Multiple biopsies of        the mass were taken with a cold forceps for histology.       A large amount of semi-liquid stool was found in the entire colon,        precluding visualization.       Multiple medium-mouthed diverticula were found in the sigmoid colon.                                                                                   Impression:          - Perianal fistula and ulceration found on perianal exam.                       - Likely malignant partially obstructing tumor in the                        rectum. Biopsied.                       - Stool in the entire examined colon.                       - No specimens collected.  Recommendation:      - Return patient to hospital [facility] for ongoing care.                 - Advance diet as tolerated.                       - Follow up biopsy results.                       - Will discuss with colorectal surgery if repeat                        colonoscopy needed at this time given concern for             metastasis to lungs.                       - Given partially obstructing lesion, closely monitor                        for symptoms of obstruction.                                                                                     < end of copied text >

## 2020-08-13 NOTE — PROGRESS NOTE ADULT - ASSESSMENT
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 Also with pulmonary nodules of unclear etiology. S/p EUA w/ bx by surgery on 8/7. S/p lung/spine biopsy w/ IR on 8/10. S/p Colonoscopy w/ GI on 8/12.

## 2020-08-13 NOTE — CONSULT NOTE ADULT - PROBLEM SELECTOR RECOMMENDATION 3
CT A/P 08/04: erosive changes L4-L5 endplates  MR lumbar 08/05: discits/myelitis L5/S1 w epidural/paraspinal ext, S1 sc collection  - 08.06 blood cultures x2 negative, final  - f/u results of lumbar spine bxs  - c/w vancomycin 1g q12 / Ertapenem 1g q24   - ID note appreciated CT A/P 08/04: erosive changes L4-L5 endplates; MR lumbar 08/05: discits/myelitis L5/S1 w epidural/paraspinal ext, S1 sc collection  - 08.06 blood cultures x2 negative, final  - f/u results of lumbar spine bxs  - c/w vancomycin 1g q12 / Ertapenem 1g q24   - ID note appreciated

## 2020-08-13 NOTE — CONSULT NOTE ADULT - REASON FOR ADMISSION
Perianal discharge and low back pain x4 weeks

## 2020-08-13 NOTE — PROGRESS NOTE ADULT - PROBLEM SELECTOR PLAN 2
L4/5 discitis w/ possible abscesses on MR  - BCx NG.  -s/p IR bx 8/10. L4-L5 tissue bx with NG. No orgs found in gram stain. Path pending.  - No current neurological deficits at present, will monitor on neuro checks q4h for now  -no relief of lower back pain with Tylenol, Toradol, and lidocaine patch. Oxy 5 with good effect.  -Per ID, Started vanc 1 g q12 and ertapenem 1g q24 after bx (narrow based on cx results). 1st dose given on 8/10. vanc trough appropriate. Appreciate ID recs

## 2020-08-13 NOTE — PROGRESS NOTE ADULT - ASSESSMENT
Mr. Stephens is a 75 Year-Old Gentleman with complex perianal fistula, with additional concern for metastatic cancer of unknown primary origin, now s/p EUS and biopsy on 8/7, and s/p CT guided core needle lung mass biopsy by IR, GI colonoscopy and biopsy.       PLAN:  - Will tentatively book patient for Laparoscopic Transverse Loop Colostomy tomorrow.   - Patient and wife to decide today if agreeable to surgery.   - Appreciate GI evaluation will follow up scope and biospy  -Remainder of care per primary team.    [facility] #[id number] Mr. Stephens is a 75 Year-Old Gentleman with complex perianal fistula, with additional concern for metastatic cancer of unknown primary origin, now s/p EUS and biopsy on 8/7, and s/p CT guided core needle lung mass biopsy by IR, GI colonoscopy and biopsy.       PLAN:  - Will tentatively book patient for Laparoscopic Sigmoid Colostomy tomorrow.   - Patient and wife to decide today if agreeable to surgery.   - Appreciate GI evaluation will follow up scope and biospy.   - Remainder of care per primary team.    [facility] #[id number]

## 2020-08-13 NOTE — PROGRESS NOTE ADULT - SUBJECTIVE AND OBJECTIVE BOX
Chief Complaint:  Patient is a 75y old  Male who presents with a chief complaint of Perianal discharge and low back pain x4 weeks (13 Aug 2020 06:59)      Interval Events:   - Patient underwent partial colonoscopy y/d with no complications  - This AM patient feels well, no BM yet but passing gas  - No abdominal pain, no melena/hematocehzia     Allergies:  dust (Rhinitis; Headache)  No Known Drug Allergies      Hospital Medications:  acetaminophen   Tablet .. 650 milliGRAM(s) Oral every 6 hours PRN  enoxaparin Injectable 40 milliGRAM(s) SubCutaneous daily  ertapenem  IVPB 1000 milliGRAM(s) IV Intermittent every 24 hours  famotidine    Tablet 20 milliGRAM(s) Oral two times a day  lidocaine   Patch 1 Patch Transdermal daily  loratadine 10 milliGRAM(s) Oral daily  oxyCODONE    IR 5 milliGRAM(s) Oral every 6 hours PRN  vancomycin  IVPB 1000 milliGRAM(s) IV Intermittent every 12 hours  zinc oxide 20% Ointment 1 Application(s) Topical every 8 hours      PMHX/PSHX:  H/O: Obesity  Basal Cell Cancer; Right side distal nose  Seasonal Allergies  H/O skin graft  Basal Cell Carcinoma of Anterior Chest; s/p MOHS surgery 8/10 proximal to suprasternal notch  Basal Cell Carcinoma of Anterior Chest; s/p MOHS surgery upper chest proximal suprasternal  notch      Family history:  No pertinent family history in first degree relatives      ROS:   General:  No wt loss, fevers, chills, night sweats, fatigue  Eyes:  Good vision, no reported pain  ENT:  No sore throat, pain, runny nose, dysphagia  CV:  No pain, palpitations, hypo/hypertension  Pulm:  No dyspnea, cough, tachypnea, wheezing  GI:  No pain, No nausea, No vomiting, No diarrhea, No constipation, No weight loss, No fever, No pruritis, No rectal bleeding, No tarry stools, No dysphagia,  :  No pain, bleeding, incontinence, nocturia  Muscle:  No pain, weakness  Neuro:  No weakness, tingling, memory problems  Psych:  No fatigue, insomnia, mood problems, depression  Endocrine:  No polyuria, polydipsia, cold/heat intolerance  Heme:  No petechiae, ecchymosis, easy bruisability  Skin:  No rash, tattoos, scars, edema      PHYSICAL EXAM:   Vital Signs:  Vital Signs Last 24 Hrs  T(C): 36.3 (13 Aug 2020 05:50), Max: 36.9 (12 Aug 2020 21:36)  T(F): 97.3 (13 Aug 2020 05:50), Max: 98.4 (12 Aug 2020 21:36)  HR: 72 (13 Aug 2020 05:50) (72 - 98)  BP: 122/80 (13 Aug 2020 05:50) (95/68 - 136/70)  BP(mean): 81 (12 Aug 2020 14:30) (81 - 105)  RR: 18 (13 Aug 2020 05:50) (14 - 19)  SpO2: 97% (13 Aug 2020 05:50) (95% - 99%)  Daily     Daily Weight in k.1 (12 Aug 2020 12:58)    GENERAL:  No acute distress, elderly man, WD WN  HEENT:  Normocephalic/atraumatic, no scleral icterus  CHEST:  Clear to auscultation bilaterally, no wheezes/rales/ronchi, no accessory muscle use  HEART:  Regular rate and rhythm, no murmurs/rubs/gallops  ABDOMEN:  Soft, non-tender, mildly distended, +umbilical hernia, normoactive bowel sounds,  no masses  EXTREMITIES: No cyanosis, clubbing, or edema  SKIN:  warm/dry  NEURO:  Alert and oriented x 3      LABS:                        14.3   7.30  )-----------( 259      ( 13 Aug 2020 06:30 )             42.9     Mean Cell Volume: 95.5 fL (13-20 @ 06:30)    08-13    139  |  102  |  7   ----------------------------<  98  4.0   |  25  |  0.75    Ca    9.2      13 Aug 2020 06:30  Phos  3.5     08-13  Mg     1.9     08-13                              14.3   7.30  )-----------( 259      ( 13 Aug 2020 06:30 )             42.9                         15.0   11.20 )-----------( 276      ( 12 Aug 2020 07:10 )             45.8                         14.6   7.51  )-----------( 248      ( 11 Aug 2020 06:05 )             46.9

## 2020-08-13 NOTE — CONSULT NOTE ADULT - SUBJECTIVE AND OBJECTIVE BOX
ONCOLOGY CONSULT NOTE: incomplete  arrival date: 08-04-20 | location: [facility] 968 A ([facility] 9S) | MRN: [id number] | patient name: ZARINA GLOVER | age: 75y ([birth year]) | gender: Male    CHIEF COMPLAINT:  perianal discharge and low back pain [id number]    HISTORY OF PRESENT ILLNESS:   Patient is a 74yo M who presented to [facility] ED 08/04/2020 with perianal discharge and low back pain [id number].     Location:  Onset:  Palliation/provication:  Quality/quantity:  Radiation:  Symptoms:  Timing:    REVIEW OF SYSTEMS:    GENERAL: no weight change, no fatigue, no weakness, no fevers, no chills, no night sweats  SKIN: no itching, burning, rashes, or leisons   HEAD: no trauma, no headache  EYES: no visual changes; no blurriness, no tearing, no itching   EARS: no hearing loss, no tinnitus, no vertigo, no earache  NOSE, SINUSES: no rhinnorhea, no stuffiness, no sneezing  MOUTH, THROAT, NECK: no hoarseness, no throat pain, no neck swelling or stiffness    BREASTS: no skin changes, no tenderness, no masses/lumps  RESPIRATORY: no SOB, no cough, no wheezing, no sputum or hemoptysis  CARDIAC: no chest pain or palpitations, no dyspnea on exertion  VASCULAR: no leg edema, no varicose veins, no claudication   GASTROINTESTINAL: no changes in appetite, no N/V, no changes in bowel movement frequency/color, no diarrhea or constipation, no abdominal or epigastric pain, no jaundice   URINARY: no dysuria, polyuria, or hematuria  NEUROLOGICAL: No numbness, no tingling, no tremors, no weakness or paralysis, no fainting or  black outs   HEMATOLOGIC: no anemia, no easy bruising/bleeding  ENDOCRINE: no heat/cold intolerance, no excessive swelling, no polyphagia  PSYCHIATRIC: no changes in mood and memory      All other review of systems is negative unless indicated above.     PAST MEDICAL HISTORY:   H/O: Obesity  Basal Cell Cancer; Right side distal nose  Seasonal Allergies    PAST SURGICAL HISTORY:   H/O skin graft  Basal Cell Carcinoma of Anterior Chest; s/p MOHS surgery 8/10 proximal to suprasternal notch    SOCIAL HISTORY:   .  Lives with the spouse.  ETOH: Wine 1 g/day.   Denies Nicotine history.  Retired .  Colonoscopy : martha. (04 Aug 2020 21:24)      FAMILY HISTORY: No pertinent family history in first degree relatives      HOME MEDICATIONS:   aspirin 325 mg oral tablet: 1 tab(s) po prn stopped on 12/10/2010 (12-02-13 @ 22:33)  Claritin 10 mg oral tablet: 1 tab(s) orally once a day (08-05-20 @ 09:33)    ORDERED/HOSPITAL MEDICATIONS:   acetaminophen   Tablet .. 650 milliGRAM(s) Oral every 6 hours PRN Temp greater or equal to 38C (100.4F), Mild Pain (1 - 3), Moderate Pain (4 - 6)  ketorolac   Injectable 15 milliGRAM(s) IV Push every 6 hours PRN Moderate Pain (4 - 6)  oxyCODONE    IR 5 milliGRAM(s) Oral every 6 hours PRN Severe Pain (7 - 10)  oxyCODONE    IR 5 milliGRAM(s) Oral once PRN Severe Pain (7 - 10)  oxyCODONE    IR 5 milliGRAM(s) Oral once  lidocaine   Patch 1 Patch Transdermal daily  dextrose 5% + sodium chloride 0.45%. 1000 milliLiter(s) (125 mL/Hr) IV Continuous <Continuous>  enoxaparin Injectable 40 milliGRAM(s) SubCutaneous daily  ertapenem  IVPB 1000 milliGRAM(s) IV Intermittent every 24 hours  famotidine    Tablet 20 milliGRAM(s) Oral two times a day  heparin   Injectable 5000 Unit(s) SubCutaneous every 12 hours  loratadine 10 milliGRAM(s) Oral daily  metroNIDAZOLE    Tablet 250 milliGRAM(s) Oral <User Schedule>  neomycin 500 milliGRAM(s) Oral <User Schedule>  piperacillin/tazobactam IVPB... 3.375 Gram(s) IV Intermittent once  polyethylene glycol 3350 17 Gram(s) Oral every 1 hour  polyethylene glycol/electrolyte Solution. 4000 milliLiter(s) Oral once  potassium chloride  10 mEq/100 mL IVPB 10 milliEquivalent(s) IV Intermittent every 1 hour  saline laxative (FLEET) Rectal Enema 1 Enema Rectal once  saline laxative (FLEET) Rectal Enema 1 Enema Rectal once  saline laxative (FLEET) Rectal Enema 1 Enema Rectal once  saline laxative (FLEET) Rectal Enema 1 Enema Rectal once PRN Bowel prep for Colon Surgery  vancomycin  IVPB 1000 milliGRAM(s) IV Intermittent once  vancomycin  IVPB 1000 milliGRAM(s) IV Intermittent every 12 hours  zinc oxide 20% Ointment 1 Application(s) Topical every 8 hours      ALLERGIES: dust (Rhinitis; Headache)    _________________________________________________________________________________________________________________  PHYSICAL EXAM:  Measurements:     Vitals: T(F): 97.3 (08-13-20 @ 05:50), Max: 98.4 (08-12-20 @ 21:36)  HR: 72 (08-13-20 @ 05:50) (72 - 98)  BP: 122/80 (08-13-20 @ 05:50) (95/68 - 136/70)  BP(mean):  (81 - 105)  RR: 18 (08-13-20 @ 05:50) (14 - 19)  SpO2: 97% (08-13-20 @ 05:50) (95% - 99%)    Constitutional: WDWN resting comfortably in bed; NAD  Head: NC/AT  Eyes: PERRL, EOMI, anicteric sclera  ENT: no nasal discharge; uvula midline, no oropharyngeal erythema or exudates; MMM  Neck: supple; no JVD or thyromegaly  Respiratory: CTA B/L; no W/R/R, no retractions  Cardiac: +S1/S2; RRR; no M/R/G; PMI non-displaced  Gastrointestinal: soft, NT/ND; no rebound or guarding; +BSx4  Genitourinary: normal external genitalia  Back: spine midline, no bony tenderness or step-offs; no CVAT B/L  Extremities: WWP, no clubbing or cyanosis; no peripheral edema  Musculoskeletal: NROM x4; no joint swelling, tenderness or erythema  Vascular: 2+ radial, femoral, DP/PT pulses B/L  Dermatologic: skin warm, dry and intact; no rashes, wounds, or scars  Lymphatic: no submandibular or cervical LAD  Neurologic: AAOx3; CNII-XII grossly intact; no focal deficits  Psychiatric: affect and characteristics of appearance, verbalizations, behaviors are appropriate    INS & OUTS:       LABORATORY FINDINGS:  CBC Full  -  ( 13 Aug 2020 06:30 )  WBC Count : 7.30 K/uL  RBC Count : 4.49 M/uL  Hemoglobin : 14.3 g/dL  Hematocrit : 42.9 %  Platelet Count - Automated : 259 K/uL  Mean Cell Volume : 95.5 fL  Mean Cell Hemoglobin : 31.8 pg  Mean Cell Hemoglobin Concentration : 33.3 %    Basic Metabolic Panel w/Mg &amp; Inorg Phos (08.13.20 @ 06:30)    Sodium, Serum: 139 mmol/L    Potassium, Serum: 4.0 mmol/L    Chloride, Serum: 102 mmol/L    Carbon Dioxide, Serum: 25 mmol/L    Anion Gap, Serum: 12 mmo/L    Blood Urea Nitrogen, Serum: 7 mg/dL    Creatinine, Serum: 0.75 mg/dL    Glucose, Serum: 98 mg/dL    Calcium, Total Serum: 9.2 mg/dL    Magnesium, Serum: 1.9 mg/dL    Phosphorus Level, Serum: 3.5:   Delta: 2.9 on 08/12/  Delta: 2.9 on 08/12/ mg/dL    eGFR if Non : 91:   The units for eGFR are ml/min/1.73m2 (normalized body surface area).    Vancomycin Level, Trough: 11.6 (08.12.20 @ 09:39)    Culture - Acid Fast - Tissue w/Smear (collected 11 Aug 2020 00:28)  Source: .Tissue L4-L5 VERTEBRAL DISC SPACE BIOPS  Preliminary Report (12 Aug 2020 15:04): Culture is being performed.    Culture - Fungal, Tissue (collected 11 Aug 2020 00:28)  Source: .Tissue L4-L5 VERTEBRAL DISC SPACE BIOPSY  Preliminary Report (11 Aug 2020 07:54): Testing in progress    Culture - Tissue with Gram Stain (collected 11 Aug 2020 00:28)  Source: .Tissue L4-L5 VERTEBRAL DISC SPACE BIOPSY  Gram Stain (11 Aug 2020 04:06): No polymorphonuclear cells seen per low power field, No organisms seen per oil power field  Preliminary Report (11 Aug 2020 21:12): No growth    Cytopathology - L4/5 Vertebral Disc Space Biopsy (Reported on: 08/12/20 15:00 EDT)   Gross Description; Received: Needle rinses  in CytoLyt. 30 ml of pink fluid.  Final Diagnosis L4 TO L5, VERTEBRAL DISC SPACE, FNA NEGATIVE FOR MALIGNANT CELLS.  Thin prep slide and cell block show moderate cellularity consisting primarily of mixed inflammatory cells, fibrous tissue  and blood. Please also refer to specimen number [id number]      RADIOGRAPHIC IMAGING:  CT Abdomen and Pelvis w/ IV Cont (08.04.20 @ 16:03)  INTERPRETATION:  CLINICAL INFORMATION: Rectal discharge. Fistula with stool.  COMPARISON: None.  Intravenous contrast: 90 ml Omnipaque 350. 10 ml discarded; Oral contrast: None.  FINDINGS: LOWER CHEST: Partially imaged large pulmonary nodules, suspicious for metastatic disease. A left lower lobe nodule measures 3.1 cm and a right lower lobe nodule measures 2.3 cm.  LIVER: Wnl. BILE DUCTS: Normal caliber. GALLBLADDER: Wnl SPLEEN: Wnl PANCREAS: Wnl ADRENALS: Wnl KIDNEYS/URETERS: WNL BLADDER: Wnl REPRODUCTIVE ORGANS: Prostate wnl.  BOWEL: Complex perianal fistula with extensive inflammation. Periampullary duodenal diverticulum. No bowel obstruction. Appendix is normal. Colonic diverticulosis.  PERITONEUM: No ascites.  VESSELS: Atherosclerotic changes.  RETROPERITONEUM/LYMPH NODES: Enlarged left common iliac node (series 2, image 79), measuring 1.2 x 1.2 cm.  ABDOMINAL WALL: Fat-containing umbilical hernia.  BONES: Erosive changes along the inferior endplate of L4 and the superior endplate of L5. Degenerative changes.  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 Erosive changes at L4-L5, suspicious for discitis osteomyelitis. bilateral pulmonary nodules, suspicious for metastatic disease.    CT Chest w/ IV Cont (08.05.20 @ 14:41)  INTERPRETATION:  CLINICAL INFORMATION: Discitis osteomyelitis and perianal fistula w h/o basal cell carcinoma of nose and bL pulmonary nodules suspicious for metastatic disease, evaluate pulmonary nodules  COMPARISON: CT abdomen 8/4/2020.  PROCEDURE: intravenous contrast. 40 ml of Omnipaque 350 was injected intravenously.  FINDINGS:  LUNGS, AIRWAYS AND PLEURA: Patent central airways.  No pleural effusions.  *  Right lower lobe nodule measuring 0.5 cm x 0.5 cm (2:127).  *  Right lower lobe nodule measuring 1.6 cm x 2 cm (2:104).  *  Left lower lobe nodule measuring 3 cm x 3.2 cm (2:108).  *  Left upper lobe nodule measuring 0.5 cm x 0.6 cm (2:72).  MEDIASTINUM AND GABRIELLA: No lymphadenopathy. HEART AND VESSELS: Heart size is normal. No pericardial effusion. CHEST WALL AND LOWER NECK: Wnl. VISUALIZED UPPER ABDOMEN: Wnl  BONES: Degenerative bone disease  IMPRESSION:  Multiple nodules involving the right lower lobe and both left lower and upper lobes, concerning for metastatic disease. If there are prior, outside CT exams of the chest they should be submitted for comparison.    MR Lumbar Spine w/wo IV Cont (08.05.20 @ 13:54)   INTERPRETATION:  Clinical indication: Grossly changes at L4-5 suspicious for discitis and osteomyelitis  Loss of the normal lumbar lordosis is seen.This could be due to positioning or muscle spasm.  There is abnormal T1 prolongation seen involving the L4 and L5 vertebral bodies, runs along endplate with abnormal enhancement involving L4-5 disc space as well. suspicious for discitis and osteomyelitis. Evidence of paraspinal extension of process suspected in left side. evidence of epidural enhancement. finding likely compatible with underlying phlegmon given no abnormal collections in this region.  evidence of abnormal collection with peripheral enhancement dorsal to superior endplate S1.   This could be compatible with early epidural abscess, continued close infarct is recommended; best seen on series 7 image 11 and measures approximately 1.7 x 0.5 cm.  T11-12: Normal T12-L1: Normal L1-2: Normal  L2-3: Disc bulge and bilateral hypertrophic facet changes are seen. Mild to moderate narrowing of the spinal canal is seen. Mild to moderate narrowing of the right neural foramen is seen.  L3-4: Disc bulge and bilateral hypertrophic facet joint changes are seen. Mild to moderate narrowing of the spinal canal is seen. Moderate narrowing of both neural foramen is seen right greater than left.  L4-5: Disc bulge and bilateral hypertrophic facet changes are seen. Moderate to severe narrowing of the spinal canal is seen. Severe narrowing of both neural foramenis seen.  L5-S1: Disc bulge without significant, spinal canal either neural foramen. The conus ends at the bottom of L1 and appears   Right-sided renal cyst is seen.  IMPRESSION:   Discitis/myelitis at L5-S1 level with some epidural and left paraspinal extension suspected. Evidence of a collection in ventral aspect of spinal canal at S1 level.    PROCEDURES:  IR Procedure - CT guided LLL lung mass core needle biopsy and L4-5 disc base aspiration (08.10.20 @ 18:11)  Clinical indication: New lung mass concerning for malignancy and  MR findings concerning for lumbar osteomyelitis. Diagnosis.  Technique: The left lower lobe lung mass, as identified on prior CT was targeted. Using CT guidance, a 17-gauge trocar needle was advanced into the left lower lobe lung mass. An 18-gauge core needle gun was advanced via the trocar into the left lower lobe lung mass, and 6 core specimens were obtained. The obtained specimens were deemed adequate by cytopathology technologist. Completion CT images demonstrated no acute complications. A dry sterile dressing was applied.   Technique: Transaxial images of lumbar spine were then obtained without use of oral or intravenous contrast. The L4-5 disc space findings concerning for osteomyelitis were again identified. Using CT guidance, an 18-gauge trocar needle was advanced into the L4-5 intervertebral disc space. A 22-gauge needle was advanced through the trocar into the disc space and two aspirations were performed. One sample was sent for microbiological analysis and the other for cytologic analysis. Completion CT images demonstrated no acute, patient's. A dry sterile dressing was applied.    Colonoscopy (08.12.20 @ 12:53)   Findings: perianal exam findings included perianal fistulas and ulcerations. A mass and fullness was felt upon digital rectal exam. A large infiltrative, friable, partially obstructing mass was found in  the rectum. The mass was circumferential and extended from the anal verge to approximately 17 cm from the anus. There was significant luminal narrowing at the proximal margin of the lesion. The colonoscope could not traverse this lesion; the colonoscope was withdrawn and an endoscope was inserted with successful traversal. Multiple biopsies of the mass were taken with a cold forceps for histology. A large amount of semi-liquid stool was found in the entire colon, precluding visualization. Multiple medium-mouthed diverticula were found in the sigmoid colon.                                                                             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 Likely malignant partially obstructing tumor in the rectum. Biopsied. Stool in the entire examined colon. No specimens collected.  Recommendation: Advance diet as tolerated. Follow up biopsy results. Will discuss with colorectal surgery if repeat colonoscopy needed at this time given concern for metastasis to lungs. Given partially obstructing lesion, closely monitor for symptoms of obstruction.                                                                                 ORDERS:   12 Lead ECG (08-13-20 @ 10:41) (not performed) [active] ONCOLOGY CONSULT NOTE:   arrival date: 08-04-20 | location: [facility] 968 A ([facility] 9S) | MRN: [id number] | patient name: ZARINA GLOVER | age: 75y ([birth year]) | gender: Male    CHIEF COMPLAINT:  perianal discharge and low back pain [id number]    HISTORY OF PRESENT ILLNESS:   Patient is a 76yo M who presented to [facility] ED 08/04/2020 with perianal discharge and low back pain [id number].     Pt reports feculent discharge from around his anal area was intermittent initially and was accompanied by mild lower back pain. Symptoms progressively worsened the week prior to admission, prompting ED visit, where CT A/P was positive for complex perianal fistula, erosive changes suspicious for L4-L5 discitis OM, and pulmonary nodules of unclear etiology. Pt was admitted for further workup, now s/p EUA w/ bx by surgery 8/7, lung/spine biopsy w/ IR 8/10 - results pending. No prior history of colonoscopy, s/p first scope w/ GI 8/12 found concern for malignant partially obstructing tumor in rectum. Pt agreed and scheduled for laparoscopic sigmoid colostomy w/ colorectal surgery tomorrow AM.     Pt endorses a 20 lb weight loss over past month 2/2 decreased appetite. Currently denies any acute bowel or bladder changes. No constipation or diarrhea, bowel movements are smaller w/o blood, mucus. Fistula self-draining feculent material. Pain is currently intermittent in lower back/rectal region, well managed with current pain regimen. Denies sob, cough, chest pain, fevers, night sweats, chills, fatigue, weakness. No history of smoking. No family history of cancer, however personal hx of BCC of nose and anterior chest s/p MOHS 2010.     REVIEW OF SYSTEMS:    GENERAL: + weight loss, no fatigue, no weakness, no fevers, no chills, no night sweats  SKIN: no itching, burning, rashes, or leisons   HEAD: no headache  EYES: no visual changes; no blurriness, no tearing  MOUTH, THROAT, NECK: no hoarseness, no throat pain, no neck swelling or stiffness    RESPIRATORY: no SOB, no cough, no wheezing, no sputum or hemoptysis  CARDIAC: no chest pain or palpitations, no dyspnea on exertion  VASCULAR: no leg edema, no varicose veins, no claudication   GASTROINTESTINAL: + changes in appetite, no N/V, + changes in bowel movement frequency, no diarrhea or constipation, no abdominal or epigastric pain, no jaundice   URINARY: no dysuria, polyuria, or hematuria  NEUROLOGICAL: No numbness, no tingling, no tremors, no weakness or paralysis, no fainting or  black outs   HEMATOLOGIC: no easy bruising/bleeding  PSYCHIATRIC: no changes in mood and memory    All other review of systems is negative unless indicated above.     PAST MEDICAL HISTORY:   H/O: Obesity  Basal Cell Cancer; Right side distal nose  Basal Cell Carcinoma of Anterior Chest  Seasonal Allergies    PAST SURGICAL HISTORY:   H/O skin graft  Basal Cell Carcinoma of Anterior Chest; s/p MOHS surgery 8/10 proximal to suprasternal notch    SOCIAL HISTORY:   .  Lives with the spouse.  ETOH: Wine 1 g/day.   Denies Nicotine history.  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 (04 Aug 2020 21:24)    FAMILY HISTORY: No pertinent family history in first degree relatives    HOME MEDICATIONS:   Claritin 10 mg oral tablet: 1 tab(s) orally once a day (08-05-20 @ 09:33) - prn for seasonal allergies    ORDERED/HOSPITAL MEDICATIONS:   acetaminophen   Tablet .. 650 milliGRAM(s) Oral every 6 hours PRN, Mild Pain (1 - 3), Moderate Pain (4 - 6)  ketorolac   Injectable 15 milliGRAM(s) IV Push every 6 hours PRN Moderate Pain (4 - 6)  oxyCODONE    IR 5 milliGRAM(s) Oral every 6 hours PRN Severe Pain (7 - 10)  lidocaine   Patch 1 Patch Transdermal daily  enoxaparin Injectable 40 milliGRAM(s) SubCutaneous daily  heparin   Injectable 5000 Unit(s) SubCutaneous every 12 hours  famotidine    Tablet 20 milliGRAM(s) Oral two times a day  loratadine 10 milliGRAM(s) Oral daily  metroNIDAZOLE    Tablet 250 milliGRAM(s) Oral <User Schedule>  neomycin 500 milliGRAM(s) Oral <User Schedule>  piperacillin/tazobactam IVPB... 3.375 Gram(s) IV Intermittent once  ertapenem  IVPB 1000 milliGRAM(s) IV Intermittent every 24 hours  vancomycin  IVPB 1000 milliGRAM(s) IV Intermittent every 12 hours  potassium chloride  10 mEq/100 mL IVPB 10 milliEquivalent(s) IV Intermittent every 1 hour  dextrose 5% + sodium chloride 0.45%. 1000 milliLiter(s) (125 mL/Hr) IV Continuous <Continuous>  saline laxative (FLEET) Rectal Enema 1 Enema Rectal PRN Bowel prep for Colon Surgery  polyethylene glycol 3350 17 Gram(s) Oral every 1 hour  zinc oxide 20% Ointment 1 Application(s) Topical every 8 hours      ALLERGIES: dust (Rhinitis; Headache)    _________________________________________________________________________________________________________________  PHYSICAL EXAM:  Measurements:     Vitals: T(F): 97.3 (08-13-20 @ 05:50), Max: 98.4 (08-12-20 @ 21:36)  HR: 72 (08-13-20 @ 05:50) (72 - 98)  BP: 122/80 (08-13-20 @ 05:50) (95/68 - 136/70)  BP(mean):  (81 - 105)  RR: 18 (08-13-20 @ 05:50) (14 - 19)  SpO2: 97% (08-13-20 @ 05:50) (95% - 99%)    Constitutional: resting comfortably in bed; NAD  Head: NC/AT  Eyes: PERRL, EOMI, anicteric sclera  ENT: no nasal discharge; MMM  Neck: supple; no JVD   Respiratory: CTA B/L; no W/R/R, no retractions  Cardiac: +S1/S2; RRR; no M/R/G; PMI non-displaced  Gastrointestinal: soft, NT/ND; no rebound or guarding; +BSx4; non-tender, reducible umbilical hernia appreciated  Back: spine midline, + tender to palpation of lower lumbar spine, IR-bx bandage to L of lumbar paraspinal  Extremities: no clubbing or cyanosis; no peripheral edema; R lower leg skin changes from old work-related injury  Musculoskeletal: no joint swelling or erythema  Vascular: 2+ radial, DP/PT pulses B/L  Neurologic: AAOx3; no focal deficits  Psychiatric: affect and characteristics of appearance, verbalizations, behaviors are appropriate    INS & OUTS: not reported    LABORATORY FINDINGS:  CBC Full  -  ( 13 Aug 2020 06:30 )  WBC Count : 7.30 K/uL  RBC Count : 4.49 M/uL  Hemoglobin : 14.3 g/dL  Hematocrit : 42.9 %  Platelet Count - Automated : 259 K/uL  Mean Cell Volume : 95.5 fL  Mean Cell Hemoglobin : 31.8 pg  Mean Cell Hemoglobin Concentration : 33.3 %    Basic Metabolic Panel w/Mg &amp; Inorg Phos (08.13.20 @ 06:30)    Sodium, Serum: 139 mmol/L    Potassium, Serum: 4.0 mmol/L    Chloride, Serum: 102 mmol/L    Carbon Dioxide, Serum: 25 mmol/L    Anion Gap, Serum: 12 mmo/L    Blood Urea Nitrogen, Serum: 7 mg/dL    Creatinine, Serum: 0.75 mg/dL    Glucose, Serum: 98 mg/dL    Calcium, Total Serum: 9.2 mg/dL    Magnesium, Serum: 1.9 mg/dL    Phosphorus Level, Serum: 3.5:   Delta: 2.9 on 08/12/  Delta: 2.9 on 08/12/ mg/dL    eGFR if Non : 91:   The units for eGFR are ml/min/1.73m2 (normalized body surface area).    Vancomycin Level, Trough: 11.6 (08.12.20 @ 09:39)    Culture - Acid Fast - Tissue w/Smear (collected 11 Aug 2020 00:28)  Source: .Tissue L4-L5 VERTEBRAL DISC SPACE BIOPS  Preliminary Report (12 Aug 2020 15:04): Culture is being performed.    Culture - Fungal, Tissue (collected 11 Aug 2020 00:28)  Source: .Tissue L4-L5 VERTEBRAL DISC SPACE BIOPSY  Preliminary Report (11 Aug 2020 07:54): Testing in progress    Culture - Tissue with Gram Stain (collected 11 Aug 2020 00:28)  Source: .Tissue L4-L5 VERTEBRAL DISC SPACE BIOPSY  Gram Stain (11 Aug 2020 04:06): No polymorphonuclear cells seen per low power field, No organisms seen per oil power field  Preliminary Report (11 Aug 2020 21:12): No growth    Cytopathology - L4/5 Vertebral Disc Space Biopsy (Reported on: 08/12/20 15:00 EDT)   Gross Description; Received: Needle rinses  in CytoLyt. 30 ml of pink fluid.  Final Diagnosis L4 TO L5, VERTEBRAL DISC SPACE, FNA NEGATIVE FOR MALIGNANT CELLS.  Thin prep slide and cell block show moderate cellularity consisting primarily of mixed inflammatory cells, fibrous tissue  and blood. Please also refer to specimen number [id number]      RADIOGRAPHIC IMAGING:  CT Abdomen and Pelvis w/ IV Cont (08.04.20 @ 16:03)  INTERPRETATION:  CLINICAL INFORMATION: Rectal discharge. Fistula with stool.  COMPARISON: None.  Intravenous contrast: 90 ml Omnipaque 350. 10 ml discarded; Oral contrast: None.  FINDINGS: LOWER CHEST: Partially imaged large pulmonary nodules, suspicious for metastatic disease. A left lower lobe nodule measures 3.1 cm and a right lower lobe nodule measures 2.3 cm.  LIVER: Wnl. BILE DUCTS: Normal caliber. GALLBLADDER: Wnl SPLEEN: Wnl PANCREAS: Wnl ADRENALS: Wnl KIDNEYS/URETERS: WNL BLADDER: Wnl REPRODUCTIVE ORGANS: Prostate wnl.  BOWEL: Complex perianal fistula with extensive inflammation. Periampullary duodenal diverticulum. No bowel obstruction. Appendix is normal. Colonic diverticulosis.  PERITONEUM: No ascites.  VESSELS: Atherosclerotic changes.  RETROPERITONEUM/LYMPH NODES: Enlarged left common iliac node (series 2, image 79), measuring 1.2 x 1.2 cm.  ABDOMINAL WALL: Fat-containing umbilical hernia.  BONES: Erosive changes along the inferior endplate of L4 and the superior endplate of L5. Degenerative changes.  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 Erosive changes at L4-L5, suspicious for discitis osteomyelitis. bilateral pulmonary nodules, suspicious for metastatic disease.    CT Chest w/ IV Cont (08.05.20 @ 14:41)  INTERPRETATION:  CLINICAL INFORMATION: Discitis osteomyelitis and perianal fistula w h/o basal cell carcinoma of nose and bL pulmonary nodules suspicious for metastatic disease, evaluate pulmonary nodules  COMPARISON: CT abdomen 8/4/2020.  PROCEDURE: intravenous contrast. 40 ml of Omnipaque 350 was injected intravenously.  FINDINGS:  LUNGS, AIRWAYS AND PLEURA: Patent central airways.  No pleural effusions.  *  Right lower lobe nodule measuring 0.5 cm x 0.5 cm (2:127).  *  Right lower lobe nodule measuring 1.6 cm x 2 cm (2:104).  *  Left lower lobe nodule measuring 3 cm x 3.2 cm (2:108).  *  Left upper lobe nodule measuring 0.5 cm x 0.6 cm (2:72).  MEDIASTINUM AND GABRIELLA: No lymphadenopathy. HEART AND VESSELS: Heart size is normal. No pericardial effusion. CHEST WALL AND LOWER NECK: Wnl. VISUALIZED UPPER ABDOMEN: Wnl  BONES: Degenerative bone disease  IMPRESSION:  Multiple nodules involving the right lower lobe and both left lower and upper lobes, concerning for metastatic disease. If there are prior, outside CT exams of the chest they should be submitted for comparison.    MR Lumbar Spine w/wo IV Cont (08.05.20 @ 13:54)   INTERPRETATION:  Clinical indication: Grossly changes at L4-5 suspicious for discitis and osteomyelitis  Loss of the normal lumbar lordosis is seen.This could be due to positioning or muscle spasm.  There is abnormal T1 prolongation seen involving the L4 and L5 vertebral bodies, runs along endplate with abnormal enhancement involving L4-5 disc space as well. suspicious for discitis and osteomyelitis. Evidence of paraspinal extension of process suspected in left side. evidence of epidural enhancement. finding likely compatible with underlying phlegmon given no abnormal collections in this region.  evidence of abnormal collection with peripheral enhancement dorsal to superior endplate S1.   This could be compatible with early epidural abscess, continued close infarct is recommended; best seen on series 7 image 11 and measures approximately 1.7 x 0.5 cm.  T11-12: Normal T12-L1: Normal L1-2: Normal  L2-3: Disc bulge and bilateral hypertrophic facet changes are seen. Mild to moderate narrowing of the spinal canal is seen. Mild to moderate narrowing of the right neural foramen is seen.  L3-4: Disc bulge and bilateral hypertrophic facet joint changes are seen. Mild to moderate narrowing of the spinal canal is seen. Moderate narrowing of both neural foramen is seen right greater than left.  L4-5: Disc bulge and bilateral hypertrophic facet changes are seen. Moderate to severe narrowing of the spinal canal is seen. Severe narrowing of both neural foramenis seen.  L5-S1: Disc bulge without significant, spinal canal either neural foramen. The conus ends at the bottom of L1 and appears   Right-sided renal cyst is seen.  IMPRESSION:   Discitis/myelitis at L5-S1 level with some epidural and left paraspinal extension suspected. Evidence of a collection in ventral aspect of spinal canal at S1 level.    PROCEDURES:  IR Procedure - CT guided LLL lung mass core needle biopsy and L4-5 disc base aspiration (08.10.20 @ 18:11)  Clinical indication: New lung mass concerning for malignancy and  MR findings concerning for lumbar osteomyelitis. Diagnosis.  Technique: The left lower lobe lung mass, as identified on prior CT was targeted. Using CT guidance, a 17-gauge trocar needle was advanced into the left lower lobe lung mass. An 18-gauge core needle gun was advanced via the trocar into the left lower lobe lung mass, and 6 core specimens were obtained. The obtained specimens were deemed adequate by cytopathology technologist. Completion CT images demonstrated no acute complications. A dry sterile dressing was applied.   Technique: Transaxial images of lumbar spine were then obtained without use of oral or intravenous contrast. The L4-5 disc space findings concerning for osteomyelitis were again identified. Using CT guidance, an 18-gauge trocar needle was advanced into the L4-5 intervertebral disc space. A 22-gauge needle was advanced through the trocar into the disc space and two aspirations were performed. One sample was sent for microbiological analysis and the other for cytologic analysis. Completion CT images demonstrated no acute, patient's. A dry sterile dressing was applied.    Colonoscopy (08.12.20 @ 12:53)   Findings: perianal exam findings included perianal fistulas and ulcerations. A mass and fullness was felt upon digital rectal exam. A large infiltrative, friable, partially obstructing mass was found in  the rectum. The mass was circumferential and extended from the anal verge to approximately 17 cm from the anus. There was significant luminal narrowing at the proximal margin of the lesion. The colonoscope could not traverse this lesion; the colonoscope was withdrawn and an endoscope was inserted with successful traversal. Multiple biopsies of the mass were taken with a cold forceps for histology. A large amount of semi-liquid stool was found in the entire colon, precluding visualization. Multiple medium-mouthed diverticula were found in the sigmoid colon.                                                                             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 Likely malignant partially obstructing tumor in the rectum. Biopsied. Stool in the entire examined colon. No specimens collected.  Recommendation: Advance diet as tolerated. Follow up biopsy results. Will discuss with colorectal surgery if repeat colonoscopy needed at this time given concern for metastasis to lungs. Given partially obstructing lesion, closely monitor for symptoms of obstruction.                                                                                 ORDERS:   12 Lead ECG (08-13-20 @ 10:41) (not performed) [active] ONCOLOGY CONSULT NOTE:   arrival date: 08-04-20 | location: [facility] 968 A ([facility] 9S) | MRN: [id number] | patient name: ZARINA GLOVER | age: 75y ([birth year]) | gender: Male    CHIEF COMPLAINT:  perianal discharge and low back pain [id number]    HISTORY OF PRESENT ILLNESS:   Patient is a 76yo M who presented to [facility] ED 08/04/2020 with perianal discharge and low back pain [id number].     Pt reports feculent discharge from around his anal area was intermittent initially and was accompanied by mild lower back pain. Symptoms progressively worsened the week prior to admission, prompting ED visit, where CT A/P was positive for complex perianal fistula, erosive changes suspicious for L4-L5 discitis OM, and pulmonary nodules of unclear etiology. Pt was admitted for further workup, now s/p EUA w/ bx by surgery 8/7, lung/spine biopsy w/ IR 8/10 - results pending. No prior history of colonoscopy, s/p first scope w/ GI 8/12 found concern for malignant partially obstructing tumor in rectum. Pt agreed and scheduled for laparoscopic sigmoid colostomy w/ colorectal surgery tomorrow AM.     Pt endorses a 20 lb weight loss over past month 2/2 decreased appetite. Currently denies any acute bowel or bladder changes. No constipation or diarrhea, bowel movements are smaller w/o blood, mucus. Fistula self-draining feculent material. Pain is currently intermittent in lower back/rectal region, well managed with current pain regimen. Denies sob, cough, chest pain, fevers, night sweats, chills, fatigue, weakness. No history of smoking. No family history of cancer, however personal hx of BCC of nose and anterior chest s/p MOHS 2010.     REVIEW OF SYSTEMS:    GENERAL: + weight loss, no fatigue, no weakness, no fevers, no chills, no night sweats  SKIN: no itching, burning, rashes, or leisons   HEAD: no headache  EYES: no visual changes; no blurriness, no tearing  MOUTH, THROAT, NECK: no hoarseness, no throat pain, no neck swelling or stiffness    RESPIRATORY: no SOB, no cough, no wheezing, no sputum or hemoptysis  CARDIAC: no chest pain or palpitations, no dyspnea on exertion  VASCULAR: no leg edema, no varicose veins, no claudication   GASTROINTESTINAL: + changes in appetite, no N/V, + changes in bowel movement frequency, no diarrhea or constipation, no abdominal or epigastric pain, no jaundice   URINARY: no dysuria, polyuria, or hematuria  NEUROLOGICAL: No numbness, no tingling, no tremors, no weakness or paralysis, no fainting or  black outs   HEMATOLOGIC: no easy bruising/bleeding  PSYCHIATRIC: no changes in mood and memory    All other review of systems is negative unless indicated above.     PAST MEDICAL HISTORY:   H/O: Obesity  Basal Cell Cancer; Right side distal nose  Basal Cell Carcinoma of Anterior Chest  Seasonal Allergies    PAST SURGICAL HISTORY:   H/O skin graft  Basal Cell Carcinoma of Anterior Chest; s/p MOHS surgery 8/10 proximal to suprasternal notch    SOCIAL HISTORY:   .  Lives with the spouse.  ETOH: Wine 1 g/day.   Denies Nicotine history.  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 (04 Aug 2020 21:24)    FAMILY HISTORY: No pertinent family history in first degree relatives    HOME MEDICATIONS:   Claritin 10 mg oral tablet: 1 tab(s) orally once a day (08-05-20 @ 09:33) - prn for seasonal allergies    ORDERED/HOSPITAL MEDICATIONS:   acetaminophen   Tablet .. 650 milliGRAM(s) Oral every 6 hours PRN, Mild Pain (1 - 3), Moderate Pain (4 - 6)  ketorolac   Injectable 15 milliGRAM(s) IV Push every 6 hours PRN Moderate Pain (4 - 6)  oxyCODONE    IR 5 milliGRAM(s) Oral every 6 hours PRN Severe Pain (7 - 10)  lidocaine   Patch 1 Patch Transdermal daily  enoxaparin Injectable 40 milliGRAM(s) SubCutaneous daily  heparin   Injectable 5000 Unit(s) SubCutaneous every 12 hours  famotidine    Tablet 20 milliGRAM(s) Oral two times a day  loratadine 10 milliGRAM(s) Oral daily  metroNIDAZOLE    Tablet 250 milliGRAM(s) Oral <User Schedule>  neomycin 500 milliGRAM(s) Oral <User Schedule>  piperacillin/tazobactam IVPB... 3.375 Gram(s) IV Intermittent once  ertapenem  IVPB 1000 milliGRAM(s) IV Intermittent every 24 hours  vancomycin  IVPB 1000 milliGRAM(s) IV Intermittent every 12 hours  potassium chloride  10 mEq/100 mL IVPB 10 milliEquivalent(s) IV Intermittent every 1 hour  dextrose 5% + sodium chloride 0.45%. 1000 milliLiter(s) (125 mL/Hr) IV Continuous <Continuous>  saline laxative (FLEET) Rectal Enema 1 Enema Rectal PRN Bowel prep for Colon Surgery  polyethylene glycol 3350 17 Gram(s) Oral every 1 hour  zinc oxide 20% Ointment 1 Application(s) Topical every 8 hours      ALLERGIES: dust (Rhinitis; Headache)    __________________________________________________________________________  PHYSICAL EXAM:  Measurements:     Vitals: T(F): 97.3 (08-13-20 @ 05:50), Max: 98.4 (08-12-20 @ 21:36)  HR: 72 (08-13-20 @ 05:50) (72 - 98)  BP: 122/80 (08-13-20 @ 05:50) (95/68 - 136/70)  BP(mean):  (81 - 105)  RR: 18 (08-13-20 @ 05:50) (14 - 19)  SpO2: 97% (08-13-20 @ 05:50) (95% - 99%)    Constitutional: resting comfortably in bed; NAD  Head: NC/AT  Eyes: PERRL, EOMI, anicteric sclera  ENT: no nasal discharge; MMM  Neck: supple; no JVD   Respiratory: CTA B/L; no W/R/R, no retractions  Cardiac: +S1/S2; RRR; no M/R/G; PMI non-displaced  Gastrointestinal: soft, NT/ND; no rebound or guarding; +BSx4; non-tender, reducible umbilical hernia appreciated  Back: spine midline, + tender to palpation of lower lumbar spine, IR-bx bandage to L of lumbar paraspinal  Extremities: no clubbing or cyanosis; no peripheral edema; R lower leg skin changes from old work-related injury  Musculoskeletal: no joint swelling or erythema  Vascular: 2+ radial, DP/PT pulses B/L  Neurologic: AAOx3; no focal deficits  Psychiatric: affect and characteristics of appearance, verbalizations, behaviors are appropriate    INS & OUTS: not reported    LABORATORY FINDINGS:  CBC Full  -  ( 13 Aug 2020 06:30 )  WBC Count : 7.30 K/uL  RBC Count : 4.49 M/uL  Hemoglobin : 14.3 g/dL  Hematocrit : 42.9 %  Platelet Count - Automated : 259 K/uL  Mean Cell Volume : 95.5 fL  Mean Cell Hemoglobin : 31.8 pg  Mean Cell Hemoglobin Concentration : 33.3 %    Basic Metabolic Panel w/Mg &amp; Inorg Phos (08.13.20 @ 06:30)    Sodium, Serum: 139 mmol/L    Potassium, Serum: 4.0 mmol/L    Chloride, Serum: 102 mmol/L    Carbon Dioxide, Serum: 25 mmol/L    Anion Gap, Serum: 12 mmo/L    Blood Urea Nitrogen, Serum: 7 mg/dL    Creatinine, Serum: 0.75 mg/dL    Glucose, Serum: 98 mg/dL    Calcium, Total Serum: 9.2 mg/dL    Magnesium, Serum: 1.9 mg/dL    Phosphorus Level, Serum: 3.5:   Delta: 2.9 on 08/12/  Delta: 2.9 on 08/12/ mg/dL    eGFR if Non : 91:   The units for eGFR are ml/min/1.73m2 (normalized body surface area).    Vancomycin Level, Trough: 11.6 (08.12.20 @ 09:39)    Culture - Acid Fast - Tissue w/Smear (collected 11 Aug 2020 00:28)  Source: .Tissue L4-L5 VERTEBRAL DISC SPACE BIOPS  Preliminary Report (12 Aug 2020 15:04): Culture is being performed.    Culture - Fungal, Tissue (collected 11 Aug 2020 00:28)  Source: .Tissue L4-L5 VERTEBRAL DISC SPACE BIOPSY  Preliminary Report (11 Aug 2020 07:54): Testing in progress    Culture - Tissue with Gram Stain (collected 11 Aug 2020 00:28)  Source: .Tissue L4-L5 VERTEBRAL DISC SPACE BIOPSY  Gram Stain (11 Aug 2020 04:06): No polymorphonuclear cells seen per low power field, No organisms seen per oil power field  Preliminary Report (11 Aug 2020 21:12): No growth    Cytopathology - L4/5 Vertebral Disc Space Biopsy (Reported on: 08/12/20 15:00 EDT)   Gross Description; Received: Needle rinses  in CytoLyt. 30 ml of pink fluid.  Final Diagnosis L4 TO L5, VERTEBRAL DISC SPACE, FNA NEGATIVE FOR MALIGNANT CELLS.  Thin prep slide and cell block show moderate cellularity consisting primarily of mixed inflammatory cells, fibrous tissue  and blood. Please also refer to specimen number [id number]      RADIOGRAPHIC IMAGING:  CT Abdomen and Pelvis w/ IV Cont (08.04.20 @ 16:03)  INTERPRETATION:  CLINICAL INFORMATION: Rectal discharge. Fistula with stool.  COMPARISON: None.  Intravenous contrast: 90 ml Omnipaque 350. 10 ml discarded; Oral contrast: None.  FINDINGS: LOWER CHEST: Partially imaged large pulmonary nodules, suspicious for metastatic disease. A left lower lobe nodule measures 3.1 cm and a right lower lobe nodule measures 2.3 cm.  LIVER: Wnl. BILE DUCTS: Normal caliber. GALLBLADDER: Wnl SPLEEN: Wnl PANCREAS: Wnl ADRENALS: Wnl KIDNEYS/URETERS: WNL BLADDER: Wnl REPRODUCTIVE ORGANS: Prostate wnl.  BOWEL: Complex perianal fistula with extensive inflammation. Periampullary duodenal diverticulum. No bowel obstruction. Appendix is normal. Colonic diverticulosis.  PERITONEUM: No ascites.  VESSELS: Atherosclerotic changes.  RETROPERITONEUM/LYMPH NODES: Enlarged left common iliac node (series 2, image 79), measuring 1.2 x 1.2 cm.  ABDOMINAL WALL: Fat-containing umbilical hernia.  BONES: Erosive changes along the inferior endplate of L4 and the superior endplate of L5. Degenerative changes.  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 Erosive changes at L4-L5, suspicious for discitis osteomyelitis. bilateral pulmonary nodules, suspicious for metastatic disease.    CT Chest w/ IV Cont (08.05.20 @ 14:41)  INTERPRETATION:  CLINICAL INFORMATION: Discitis osteomyelitis and perianal fistula w h/o basal cell carcinoma of nose and bL pulmonary nodules suspicious for metastatic disease, evaluate pulmonary nodules  COMPARISON: CT abdomen 8/4/2020.  PROCEDURE: intravenous contrast. 40 ml of Omnipaque 350 was injected intravenously.  FINDINGS:  LUNGS, AIRWAYS AND PLEURA: Patent central airways.  No pleural effusions.  *  Right lower lobe nodule measuring 0.5 cm x 0.5 cm (2:127).  *  Right lower lobe nodule measuring 1.6 cm x 2 cm (2:104).  *  Left lower lobe nodule measuring 3 cm x 3.2 cm (2:108).  *  Left upper lobe nodule measuring 0.5 cm x 0.6 cm (2:72).  MEDIASTINUM AND GABRIELLA: No lymphadenopathy. HEART AND VESSELS: Heart size is normal. No pericardial effusion. CHEST WALL AND LOWER NECK: Wnl. VISUALIZED UPPER ABDOMEN: Wnl  BONES: Degenerative bone disease  IMPRESSION:  Multiple nodules involving the right lower lobe and both left lower and upper lobes, concerning for metastatic disease. If there are prior, outside CT exams of the chest they should be submitted for comparison.    MR Lumbar Spine w/wo IV Cont (08.05.20 @ 13:54)   INTERPRETATION:  Clinical indication: Grossly changes at L4-5 suspicious for discitis and osteomyelitis  Loss of the normal lumbar lordosis is seen.This could be due to positioning or muscle spasm.  There is abnormal T1 prolongation seen involving the L4 and L5 vertebral bodies, runs along endplate with abnormal enhancement involving L4-5 disc space as well. suspicious for discitis and osteomyelitis. Evidence of paraspinal extension of process suspected in left side. evidence of epidural enhancement. finding likely compatible with underlying phlegmon given no abnormal collections in this region.  evidence of abnormal collection with peripheral enhancement dorsal to superior endplate S1.   This could be compatible with early epidural abscess, continued close infarct is recommended; best seen on series 7 image 11 and measures approximately 1.7 x 0.5 cm.  T11-12: Normal T12-L1: Normal L1-2: Normal  L2-3: Disc bulge and bilateral hypertrophic facet changes are seen. Mild to moderate narrowing of the spinal canal is seen. Mild to moderate narrowing of the right neural foramen is seen.  L3-4: Disc bulge and bilateral hypertrophic facet joint changes are seen. Mild to moderate narrowing of the spinal canal is seen. Moderate narrowing of both neural foramen is seen right greater than left.  L4-5: Disc bulge and bilateral hypertrophic facet changes are seen. Moderate to severe narrowing of the spinal canal is seen. Severe narrowing of both neural foramenis seen.  L5-S1: Disc bulge without significant, spinal canal either neural foramen. The conus ends at the bottom of L1 and appears   Right-sided renal cyst is seen.  IMPRESSION:   Discitis/myelitis at L5-S1 level with some epidural and left paraspinal extension suspected. Evidence of a collection in ventral aspect of spinal canal at S1 level.    PROCEDURES:  IR Procedure - CT guided LLL lung mass core needle biopsy and L4-5 disc base aspiration (08.10.20 @ 18:11)  Clinical indication: New lung mass concerning for malignancy and  MR findings concerning for lumbar osteomyelitis. Diagnosis.  Technique: The left lower lobe lung mass, as identified on prior CT was targeted. Using CT guidance, a 17-gauge trocar needle was advanced into the left lower lobe lung mass. An 18-gauge core needle gun was advanced via the trocar into the left lower lobe lung mass, and 6 core specimens were obtained. The obtained specimens were deemed adequate by cytopathology technologist. Completion CT images demonstrated no acute complications. A dry sterile dressing was applied.   Technique: Transaxial images of lumbar spine were then obtained without use of oral or intravenous contrast. The L4-5 disc space findings concerning for osteomyelitis were again identified. Using CT guidance, an 18-gauge trocar needle was advanced into the L4-5 intervertebral disc space. A 22-gauge needle was advanced through the trocar into the disc space and two aspirations were performed. One sample was sent for microbiological analysis and the other for cytologic analysis. Completion CT images demonstrated no acute, patient's. A dry sterile dressing was applied.    Colonoscopy (08.12.20 @ 12:53)   Findings: perianal exam findings included perianal fistulas and ulcerations. A mass and fullness was felt upon digital rectal exam. A large infiltrative, friable, partially obstructing mass was found in  the rectum. The mass was circumferential and extended from the anal verge to approximately 17 cm from the anus. There was significant luminal narrowing at the proximal margin of the lesion. The colonoscope could not traverse this lesion; the colonoscope was withdrawn and an endoscope was inserted with successful traversal. Multiple biopsies of the mass were taken with a cold forceps for histology. A large amount of semi-liquid stool was found in the entire colon, precluding visualization. Multiple medium-mouthed diverticula were found in the sigmoid colon.                                                                             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 Likely malignant partially obstructing tumor in the rectum. Biopsied. Stool in the entire examined colon. No specimens collected.  Recommendation: Advance diet as tolerated. Follow up biopsy results. Will discuss with colorectal surgery if repeat colonoscopy needed at this time given concern for metastasis to lungs. Given partially obstructing lesion, closely monitor for symptoms of obstruction.                                                                                 ORDERS:   12 Lead ECG (08-13-20 @ 10:41) (not performed) [active] ONCOLOGY CONSULT NOTE:   arrival date: 08-04-20 | location: [facility] 9[address] A ([facility] 9S) | MRN: [id number] | patient name: ZARINA GLOVER | age: 75y ([birth year]) | gender: Male    CHIEF COMPLAINT:  perianal discharge and low back pain [id number]    HISTORY OF PRESENT ILLNESS:   Patient is a 76yo M who presented to [facility] ED 08/04/2020 with perianal discharge and low back pain [id number].     Pt reports feculent discharge from around his anal area was intermittent initially and was accompanied by mild lower back pain. Symptoms progressively worsened the week prior to admission, prompting ED visit, where CT A/P was positive for complex perianal fistula, erosive changes suspicious for L4-L5 discitis OM, and pulmonary nodules of unclear etiology. Pt was admitted for further workup, now s/p EUA w/ bx by surgery 8/7, lung/spine biopsy w/ IR 8/10 - results pending. No prior history of colonoscopy, s/p first scope w/ GI 8/12 found concern for malignant partially obstructing tumor in rectum. Pt agreed and scheduled for laparoscopic sigmoid colostomy w/ colorectal surgery tomorrow AM.     Pt endorses a 20 lb weight loss over past month 2/2 decreased appetite. Currently denies any acute bowel or bladder changes. No constipation or diarrhea, bowel movements are smaller w/o blood, mucus. Fistula self-draining feculent material. Pain is currently intermittent in lower back/rectal region, well managed with current pain regimen. Denies sob, cough, chest pain, fevers, night sweats, chills, fatigue, weakness. No history of smoking. No family history of cancer, however personal hx of BCC of nose and anterior chest s/p MOHS 2010.     REVIEW OF SYSTEMS:    GENERAL: + weight loss, no fatigue, no weakness, no fevers, no chills, no night sweats  SKIN: no itching, burning, rashes, or leisons   HEAD: no headache  EYES: no visual changes; no blurriness, no tearing  MOUTH, THROAT, NECK: no hoarseness, no throat pain, no neck swelling or stiffness    RESPIRATORY: no SOB, no cough, no wheezing, no sputum or hemoptysis  CARDIAC: no chest pain or palpitations, no dyspnea on exertion  VASCULAR: no leg edema, no varicose veins, no claudication   GASTROINTESTINAL: + changes in appetite, no N/V, + changes in bowel movement frequency, no diarrhea or constipation, no abdominal or epigastric pain, no jaundice   URINARY: no dysuria, polyuria, or hematuria  NEUROLOGICAL: No numbness, no tingling, no tremors, no weakness or paralysis, no fainting or  black outs   HEMATOLOGIC: no easy bruising/bleeding  PSYCHIATRIC: no changes in mood and memory    All other review of systems is negative unless indicated above.     PAST MEDICAL HISTORY:   H/O: Obesity  Basal Cell Cancer; Right side distal nose  Basal Cell Carcinoma of Anterior Chest  Seasonal Allergies    PAST SURGICAL HISTORY:   H/O skin graft  Basal Cell Carcinoma of Anterior Chest; s/p MOHS surgery 8/10 proximal to suprasternal notch    SOCIAL HISTORY:   .  Lives with the spouse.  ETOH: Wine 1 g/day.   Denies Nicotine history.  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 (04 Aug 2020 21:24)    FAMILY HISTORY: No pertinent family history in first degree relatives    HOME MEDICATIONS:   Claritin 10 mg oral tablet: 1 tab(s) orally once a day (08-05-20 @ 09:33) - prn for seasonal allergies    ORDERED/HOSPITAL MEDICATIONS:   acetaminophen   Tablet .. 650 milliGRAM(s) Oral every 6 hours PRN, Mild Pain (1 - 3), Moderate Pain (4 - 6)  ketorolac   Injectable 15 milliGRAM(s) IV Push every 6 hours PRN Moderate Pain (4 - 6)  oxyCODONE    IR 5 milliGRAM(s) Oral every 6 hours PRN Severe Pain (7 - 10)  lidocaine   Patch 1 Patch Transdermal daily  enoxaparin Injectable 40 milliGRAM(s) SubCutaneous daily  heparin   Injectable 5000 Unit(s) SubCutaneous every 12 hours  famotidine    Tablet 20 milliGRAM(s) Oral two times a day  loratadine 10 milliGRAM(s) Oral daily  metroNIDAZOLE    Tablet 250 milliGRAM(s) Oral <User Schedule>  neomycin 500 milliGRAM(s) Oral <User Schedule>  piperacillin/tazobactam IVPB... 3.375 Gram(s) IV Intermittent once  ertapenem  IVPB 1000 milliGRAM(s) IV Intermittent every 24 hours  vancomycin  IVPB 1000 milliGRAM(s) IV Intermittent every 12 hours  potassium chloride  10 mEq/100 mL IVPB 10 milliEquivalent(s) IV Intermittent every 1 hour  dextrose 5% + sodium chloride 0.45%. 1000 milliLiter(s) (125 mL/Hr) IV Continuous <Continuous>  saline laxative (FLEET) Rectal Enema 1 Enema Rectal PRN Bowel prep for Colon Surgery  polyethylene glycol 3350 17 Gram(s) Oral every 1 hour  zinc oxide 20% Ointment 1 Application(s) Topical every 8 hours      ALLERGIES: dust (Rhinitis; Headache)    __________________________________________________________________________  PHYSICAL EXAM:  Measurements:     Vitals: T(F): 97.3 (08-13-20 @ 05:50), Max: 98.4 (08-12-20 @ 21:36)  HR: 72 (08-13-20 @ 05:50) (72 - 98)  BP: 122/80 (08-13-20 @ 05:50) (95/68 - 136/70)  BP(mean):  (81 - 105)  RR: 18 (08-13-20 @ 05:50) (14 - 19)  SpO2: 97% (08-13-20 @ 05:50) (95% - 99%)    Constitutional: resting comfortably in bed; NAD  Head: NC/AT  Eyes: PERRL, EOMI, anicteric sclera  ENT: no nasal discharge; MMM  Neck: supple; no JVD   Respiratory: CTA B/L; no W/R/R, no retractions  Cardiac: +S1/S2; RRR; no M/R/G; PMI non-displaced  Gastrointestinal: soft, NT/ND; no rebound or guarding; +BSx4; non-tender, reducible umbilical hernia appreciated  Back: spine midline, + tender to palpation of lower lumbar spine, IR-bx bandage to L of lumbar paraspinal  Extremities: no clubbing or cyanosis; no peripheral edema; R lower leg skin changes from old work-related injury  Musculoskeletal: no joint swelling or erythema  Vascular: 2+ radial, DP/PT pulses B/L  Neurologic: AAOx3; no focal deficits  Psychiatric: affect and characteristics of appearance, verbalizations, behaviors are appropriate    INS & OUTS: not reported    LABORATORY FINDINGS:  CBC Full  -  ( 13 Aug 2020 06:30 )  WBC Count : 7.30 K/uL  RBC Count : 4.49 M/uL  Hemoglobin : 14.3 g/dL  Hematocrit : 42.9 %  Platelet Count - Automated : 259 K/uL  Mean Cell Volume : 95.5 fL  Mean Cell Hemoglobin : 31.8 pg  Mean Cell Hemoglobin Concentration : 33.3 %    Basic Metabolic Panel w/Mg &amp; Inorg Phos (08.13.20 @ 06:30)    Sodium, Serum: 139 mmol/L    Potassium, Serum: 4.0 mmol/L    Chloride, Serum: 102 mmol/L    Carbon Dioxide, Serum: 25 mmol/L    Anion Gap, Serum: 12 mmo/L    Blood Urea Nitrogen, Serum: 7 mg/dL    Creatinine, Serum: 0.75 mg/dL    Glucose, Serum: 98 mg/dL    Calcium, Total Serum: 9.2 mg/dL    Magnesium, Serum: 1.9 mg/dL    Phosphorus Level, Serum: 3.5:   Delta: 2.9 on 08/12/  Delta: 2.9 on 08/12/ mg/dL    eGFR if Non : 91:   The units for eGFR are ml/min/1.73m2 (normalized body surface area).    Carcinoembryonic Antigen 9.3: (08.06.20 @ 07:15)  Reference Range: Non smokers: Less than 3.9 ng/mL    Vancomycin Level, Trough: 11.6 (08.12.20 @ 09:39)    Culture - Acid Fast - Tissue w/Smear (collected 11 Aug 2020 00:28)  Source: .Tissue L4-L5 VERTEBRAL DISC SPACE BIOPS  Preliminary Report (12 Aug 2020 15:04): Culture is being performed.    Culture - Fungal, Tissue (collected 11 Aug 2020 00:28)  Source: .Tissue L4-L5 VERTEBRAL DISC SPACE BIOPSY  Preliminary Report (11 Aug 2020 07:54): Testing in progress    Culture - Tissue with Gram Stain (collected 11 Aug 2020 00:28)  Source: .Tissue L4-L5 VERTEBRAL DISC SPACE BIOPSY  Gram Stain (11 Aug 2020 04:06): No polymorphonuclear cells seen per low power field, No organisms seen per oil power field  Preliminary Report (11 Aug 2020 21:12): No growth    Cytopathology - L4/5 Vertebral Disc Space Biopsy (Reported on: 08/12/20 15:00 EDT)   Gross Description; Received: Needle rinses  in CytoLyt. 30 ml of pink fluid.  Final Diagnosis L4 TO L5, VERTEBRAL DISC SPACE, FNA NEGATIVE FOR MALIGNANT CELLS.  Thin prep slide and cell block show moderate cellularity consisting primarily of mixed inflammatory cells, fibrous tissue  and blood. Please also refer to specimen number [id number]-.      RADIOGRAPHIC IMAGING:  CT Abdomen and Pelvis w/ IV Cont (08.04.20 @ 16:03)  INTERPRETATION:  CLINICAL INFORMATION: Rectal discharge. Fistula with stool.  COMPARISON: None.  Intravenous contrast: 90 ml Omnipaque 350. 10 ml discarded; Oral contrast: None.  FINDINGS: LOWER CHEST: Partially imaged large pulmonary nodules, suspicious for metastatic disease. A left lower lobe nodule measures 3.1 cm and a right lower lobe nodule measures 2.3 cm.  LIVER: Wnl. BILE DUCTS: Normal caliber. GALLBLADDER: Wnl SPLEEN: Wnl PANCREAS: Wnl ADRENALS: Wnl KIDNEYS/URETERS: WNL BLADDER: Wnl REPRODUCTIVE ORGANS: Prostate wnl.  BOWEL: Complex perianal fistula with extensive inflammation. Periampullary duodenal diverticulum. No bowel obstruction. Appendix is normal. Colonic diverticulosis.  PERITONEUM: No ascites.  VESSELS: Atherosclerotic changes.  RETROPERITONEUM/LYMPH NODES: Enlarged left common iliac node (series 2, image 79), measuring 1.2 x 1.2 cm.  ABDOMINAL WALL: Fat-containing umbilical hernia.  BONES: Erosive changes along the inferior endplate of L4 and the superior endplate of L5. Degenerative changes.  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 Erosive changes at L4-L5, suspicious for discitis osteomyelitis. bilateral pulmonary nodules, suspicious for metastatic disease.    CT Chest w/ IV Cont (08.05.20 @ 14:41)  INTERPRETATION:  CLINICAL INFORMATION: Discitis osteomyelitis and perianal fistula w h/o basal cell carcinoma of nose and bL pulmonary nodules suspicious for metastatic disease, evaluate pulmonary nodules  COMPARISON: CT abdomen 8/4/2020.  PROCEDURE: intravenous contrast. 40 ml of Omnipaque 350 was injected intravenously.  FINDINGS:  LUNGS, AIRWAYS AND PLEURA: Patent central airways.  No pleural effusions.  *  Right lower lobe nodule measuring 0.5 cm x 0.5 cm (2:127).  *  Right lower lobe nodule measuring 1.6 cm x 2 cm (2:104).  *  Left lower lobe nodule measuring 3 cm x 3.2 cm (2:108).  *  Left upper lobe nodule measuring 0.5 cm x 0.6 cm (2:72).  MEDIASTINUM AND GABRIELLA: No lymphadenopathy. HEART AND VESSELS: Heart size is normal. No pericardial effusion. CHEST WALL AND LOWER NECK: Wnl. VISUALIZED UPPER ABDOMEN: Wnl  BONES: Degenerative bone disease  IMPRESSION:  Multiple nodules involving the right lower lobe and both left lower and upper lobes, concerning for metastatic disease. If there are prior, outside CT exams of the chest they should be submitted for comparison.    MR Lumbar Spine w/wo IV Cont (08.05.20 @ 13:54)   INTERPRETATION:  Clinical indication: Grossly changes at L4-5 suspicious for discitis and osteomyelitis  Loss of the normal lumbar lordosis is seen.This could be due to positioning or muscle spasm.  There is abnormal T1 prolongation seen involving the L4 and L5 vertebral bodies, runs along endplate with abnormal enhancement involving L4-5 disc space as well. suspicious for discitis and osteomyelitis. Evidence of paraspinal extension of process suspected in left side. evidence of epidural enhancement. finding likely compatible with underlying phlegmon given no abnormal collections in this region.  evidence of abnormal collection with peripheral enhancement dorsal to superior endplate S1.   This could be compatible with early epidural abscess, continued close infarct is recommended; best seen on series 7 image 11 and measures approximately 1.7 x 0.5 cm.  T11-12: Normal T12-L1: Normal L1-2: Normal  L2-3: Disc bulge and bilateral hypertrophic facet changes are seen. Mild to moderate narrowing of the spinal canal is seen. Mild to moderate narrowing of the right neural foramen is seen.  L3-4: Disc bulge and bilateral hypertrophic facet joint changes are seen. Mild to moderate narrowing of the spinal canal is seen. Moderate narrowing of both neural foramen is seen right greater than left.  L4-5: Disc bulge and bilateral hypertrophic facet changes are seen. Moderate to severe narrowing of the spinal canal is seen. Severe narrowing of both neural foramenis seen.  L5-S1: Disc bulge without significant, spinal canal either neural foramen. The conus ends at the bottom of L1 and appears   Right-sided renal cyst is seen.  IMPRESSION:   Discitis/myelitis at L5-S1 level with some epidural and left paraspinal extension suspected. Evidence of a collection in ventral aspect of spinal canal at S1 level.    PROCEDURES:  IR Procedure - CT guided LLL lung mass core needle biopsy and L4-5 disc base aspiration (08.10.20 @ 18:11)  Clinical indication: New lung mass concerning for malignancy and  MR findings concerning for lumbar osteomyelitis. Diagnosis.  Technique: The left lower lobe lung mass, as identified on prior CT was targeted. Using CT guidance, a 17-gauge trocar needle was advanced into the left lower lobe lung mass. An 18-gauge core needle gun was advanced via the trocar into the left lower lobe lung mass, and 6 core specimens were obtained. The obtained specimens were deemed adequate by cytopathology technologist. Completion CT images demonstrated no acute complications. A dry sterile dressing was applied.   Technique: Transaxial images of lumbar spine were then obtained without use of oral or intravenous contrast. The L4-5 disc space findings concerning for osteomyelitis were again identified. Using CT guidance, an 18-gauge trocar needle was advanced into the L4-5 intervertebral disc space. A 22-gauge needle was advanced through the trocar into the disc space and two aspirations were performed. One sample was sent for microbiological analysis and the other for cytologic analysis. Completion CT images demonstrated no acute, patient's. A dry sterile dressing was applied.    Colonoscopy (08.12.20 @ 12:53)   Findings: perianal exam findings included perianal fistulas and ulcerations. A mass and fullness was felt upon digital rectal exam. A large infiltrative, friable, partially obstructing mass was found in  the rectum. The mass was circumferential and extended from the anal verge to approximately 17 cm from the anus. There was significant luminal narrowing at the proximal margin of the lesion. The colonoscope could not traverse this lesion; the colonoscope was withdrawn and an endoscope was inserted with successful traversal. Multiple biopsies of the mass were taken with a cold forceps for histology. A large amount of semi-liquid stool was found in the entire colon, precluding visualization. Multiple medium-mouthed diverticula were found in the sigmoid colon.                                                                             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 Likely malignant partially obstructing tumor in the rectum. Biopsied. Stool in the entire examined colon. No specimens collected.  Recommendation: Advance diet as tolerated. Follow up biopsy results. Will discuss with colorectal surgery if repeat colonoscopy needed at this time given concern for metastasis to lungs. Given partially obstructing lesion, closely monitor for symptoms of obstruction.                                                                                 ORDERS:   12 Lead ECG (08-13-20 @ 10:41) (not performed) [active] ONCOLOGY CONSULT NOTE:   patient: Sai Henao | age: 75y ([birth year]) | gender: Male    CHIEF COMPLAINT:  perianal discharge and low back pain [id number]    HISTORY OF PRESENT ILLNESS:   Patient is a 76yo M who presented to [facility] ED 08/04/2020 with perianal discharge and low back pain [id number].     Pt reports feculent discharge from around his anal area was intermittent initially and was accompanied by mild lower back pain. Symptoms progressively worsened the week prior to admission, prompting ED visit, where CT A/P was positive for complex perianal fistula, erosive changes suspicious for L4-L5 discitis OM, and pulmonary nodules of unclear etiology. Pt was admitted for further workup, now s/p EUA w/ bx by surgery 8/7, lung/spine biopsy w/ IR 8/10 - results pending. No prior history of colonoscopy, s/p first scope w/ GI 8/12 found concern for malignant partially obstructing tumor in rectum. Pt agreed and scheduled for laparoscopic sigmoid colostomy w/ colorectal surgery tomorrow AM.     Pt endorses a 20 lb weight loss over past month 2/2 decreased appetite. Currently denies any acute bowel or bladder changes. No constipation or diarrhea, bowel movements are smaller w/o blood, mucus. Fistula self-draining feculent material. Pain is currently intermittent in lower back/rectal region, well managed with current pain regimen. Denies sob, cough, chest pain, fevers, night sweats, chills, fatigue, weakness. No history of smoking. No family history of cancer, however personal hx of BCC of nose and anterior chest s/p MOHS 2010.     REVIEW OF SYSTEMS:  GENERAL: + weight loss, no fatigue, no weakness, no fevers, no chills, no night sweats  SKIN: no itching, burning, rashes, or leisons   HEAD: no headache  EYES: no visual changes; no blurriness, no tearing  MOUTH, THROAT, NECK: no hoarseness, no throat pain, no neck swelling or stiffness    RESPIRATORY: no SOB, no cough, no wheezing, no sputum or hemoptysis  CARDIAC: no chest pain or palpitations, no dyspnea on exertion  VASCULAR: no leg edema, no varicose veins, no claudication   GASTROINTESTINAL: + changes in appetite, no N/V, + changes in bowel movement frequency, no diarrhea or constipation, no abdominal or epigastric pain, no jaundice   URINARY: no dysuria, polyuria, or hematuria  NEUROLOGICAL: No numbness, no tingling, no tremors, no weakness or paralysis, no fainting or  black outs   HEMATOLOGIC: no easy bruising/bleeding  PSYCHIATRIC: no changes in mood and memory  All other review of systems is negative unless indicated above.     PAST MEDICAL HISTORY:   H/O: Obesity  Basal Cell Cancer; Right side distal nose  Basal Cell Carcinoma of Anterior Chest  Seasonal Allergies    PAST SURGICAL HISTORY:   H/O skin graft  Basal Cell Carcinoma of Anterior Chest; s/p MOHS surgery 8/10 proximal to suprasternal notch    SOCIAL HISTORY:   .  Lives with the spouse.  ETOH: Wine 1 g/day.   Denies Nicotine history.  Retired .  Colonoscopy : denies. (04 Aug 2020 21:24)    FAMILY HISTORY: No pertinent family history in first degree relatives    HOME MEDICATIONS:   Claritin 10 mg oral tablet: 1 tab(s) po qd (08-05-20 @ 09:33) - prn for seasonal allergies    ORDERED/HOSPITAL MEDICATIONS:   acetaminophen   Tablet .. 650 milliGRAM(s) Oral every 6 hours PRN, Mild Pain (1 - 3), Moderate Pain (4 - 6)  ketorolac   Injectable 15 milliGRAM(s) IV Push every 6 hours PRN Moderate Pain (4 - 6)  oxyCODONE    IR 5 milliGRAM(s) Oral every 6 hours PRN Severe Pain (7 - 10)  lidocaine   Patch 1 Patch Transdermal daily  enoxaparin Injectable 40 milliGRAM(s) SubCutaneous daily  heparin   Injectable 5000 Unit(s) SubCutaneous every 12 hours  famotidine    Tablet 20 milliGRAM(s) Oral two times a day  loratadine 10 milliGRAM(s) Oral daily  neomycin 500 milliGRAM(s) Oral <User Schedule>  ertapenem  IVPB 1000 milliGRAM(s) IV Intermittent every 24 hours  vancomycin  IVPB 1000 milliGRAM(s) IV Intermittent every 12 hours  potassium chloride  10 mEq/100 mL IVPB 10 milliEquivalent(s) IV Intermittent every 1 hour  dextrose 5% + sodium chloride 0.45%. 1000 milliLiter(s) (125 mL/Hr) IV Continuous <Continuous>  saline laxative (FLEET) Rectal Enema 1 Enema Rectal PRN Bowel prep for Colon Surgery  polyethylene glycol 3350 17 Gram(s) Oral every 1 hour  zinc oxide 20% Ointment 1 Application(s) Topical every 8 hours      ALLERGIES: dust (Rhinitis; Headache)    __________________________________________________________________________  PHYSICAL EXAM:  Measurements:     Vitals: T(F): 97.3 (08-13-20 @ 05:50), Max: 98.4 (08-12-20 @ 21:36)  HR: 72 (08-13-20 @ 05:50) (72 - 98)  BP: 122/80 (08-13-20 @ 05:50) (95/68 - 136/70)  BP(mean):  (81 - 105)  RR: 18 (08-13-20 @ 05:50) (14 - 19)  SpO2: 97% (08-13-20 @ 05:50) (95% - 99%)    Constitutional: resting comfortably in bed; NAD  Head: NC/AT  Eyes: PERRL, EOMI, anicteric sclera  ENT: no nasal discharge; MMM  Neck: supple; no JVD   Respiratory: CTA B/L; no W/R/R, no retractions  Cardiac: +S1/S2; RRR; no M/R/G; PMI non-displaced  Gastrointestinal: soft, NT/ND; no rebound or guarding; +BSx4; non-tender, reducible umbilical hernia appreciated  Back: spine midline, + tender to palpation of lower lumbar spine, IR-bx bandage to L of lumbar paraspinal  Extremities: no clubbing or cyanosis; no peripheral edema; R lower leg skin changes from old work-related injury  Musculoskeletal: no joint swelling or erythema  Vascular: 2+ radial, DP/PT pulses B/L  Neurologic: AAOx3; no focal deficits  Psychiatric: affect and characteristics of appearance, verbalizations, behaviors are appropriate    INS & OUTS: not reported    LABORATORY FINDINGS:  CBC Full  -  ( 13 Aug 2020 06:30 )  WBC Count : 7.30 K/uL  RBC Count : 4.49 M/uL  Hemoglobin : 14.3 g/dL  Hematocrit : 42.9 %  Platelet Count - Automated : 259 K/uL  Mean Cell Volume : 95.5 fL  Mean Cell Hemoglobin : 31.8 pg  Mean Cell Hemoglobin Concentration : 33.3 %    Basic Metabolic Panel w/Mg &amp; Inorg Phos (08.13.20 @ 06:30)    Sodium, Serum: 139 mmol/L    Potassium, Serum: 4.0 mmol/L    Chloride, Serum: 102 mmol/L    Carbon Dioxide, Serum: 25 mmol/L    Anion Gap, Serum: 12 mmo/L    Blood Urea Nitrogen, Serum: 7 mg/dL    Creatinine, Serum: 0.75 mg/dL    Glucose, Serum: 98 mg/dL    Calcium, Total Serum: 9.2 mg/dL    Magnesium, Serum: 1.9 mg/dL    Phosphorus Level, Serum: 3.5:   Delta: 2.9 on 08/12/  Delta: 2.9 on 08/12/ mg/dL    eGFR if Non : 91:   The units for eGFR are ml/min/1.73m2 (normalized body surface area).    Carcinoembryonic Antigen 9.3: (08.06.20 @ 07:15)  Reference Range: Non smokers: Less than 3.9 ng/mL    Vancomycin Level, Trough: 11.6 (08.12.20 @ 09:39)    Culture - Acid Fast - Tissue w/Smear (collected 11 Aug 2020 00:28)  Source: .Tissue L4-L5 VERTEBRAL DISC SPACE BIOPS  Preliminary Report (12 Aug 2020 15:04): Culture is being performed.    Culture - Fungal, Tissue (collected 11 Aug 2020 00:28)  Source: .Tissue L4-L5 VERTEBRAL DISC SPACE BIOPSY  Preliminary Report (11 Aug 2020 07:54): Testing in progress    Culture - Tissue with Gram Stain (collected 11 Aug 2020 00:28)  Source: .Tissue L4-L5 VERTEBRAL DISC SPACE BIOPSY  Gram Stain (11 Aug 2020 04:06): No polymorphonuclear cells seen per low power field, No organisms seen per oil power field  Preliminary Report (11 Aug 2020 21:12): No growth    Cytopathology - L4/5 Vertebral Disc Space Biopsy (Reported on: 08/12/20 15:00 EDT)   Gross Description; Received: Needle rinses  in CytoLyt. 30 ml of pink fluid.  Final Diagnosis L4 TO L5, VERTEBRAL DISC SPACE, FNA NEGATIVE FOR MALIGNANT CELLS.  Thin prep slide and cell block show moderate cellularity consisting primarily of mixed inflammatory cells, fibrous tissue  and blood. Please also refer to specimen number [id number]-.      RADIOGRAPHIC IMAGING:  CT Abdomen and Pelvis w/ IV Cont (08.04.20 @ 16:03)  INTERPRETATION:  CLINICAL INFORMATION: Rectal discharge. Fistula with stool.  COMPARISON: None.  Intravenous contrast: 90 ml Omnipaque 350. 10 ml discarded; Oral contrast: None.  FINDINGS: LOWER CHEST: Partially imaged large pulmonary nodules, suspicious for metastatic disease. A left lower lobe nodule measures 3.1 cm and a right lower lobe nodule measures 2.3 cm.  LIVER: Wnl. BILE DUCTS: Normal caliber. GALLBLADDER: Wnl SPLEEN: Wnl PANCREAS: Wnl ADRENALS: Wnl KIDNEYS/URETERS: WNL BLADDER: Wnl REPRODUCTIVE ORGANS: Prostate wnl.  BOWEL: Complex perianal fistula with extensive inflammation. Periampullary duodenal diverticulum. No bowel obstruction. Appendix is normal. Colonic diverticulosis.  PERITONEUM: No ascites.  VESSELS: Atherosclerotic changes.  RETROPERITONEUM/LYMPH NODES: Enlarged left common iliac node (series 2, image 79), measuring 1.2 x 1.2 cm.  ABDOMINAL WALL: Fat-containing umbilical hernia.  BONES: Erosive changes along the inferior endplate of L4 and the superior endplate of L5. Degenerative changes.  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 Erosive changes at L4-L5, suspicious for discitis osteomyelitis. bilateral pulmonary nodules, suspicious for metastatic disease.    CT Chest w/ IV Cont (08.05.20 @ 14:41)  INTERPRETATION:  CLINICAL INFORMATION: Discitis osteomyelitis and perianal fistula w h/o basal cell carcinoma of nose and bL pulmonary nodules suspicious for metastatic disease, evaluate pulmonary nodules  COMPARISON: CT abdomen 8/4/2020.  PROCEDURE: intravenous contrast. 40 ml of Omnipaque 350 was injected intravenously.  FINDINGS:  LUNGS, AIRWAYS AND PLEURA: Patent central airways.  No pleural effusions.  *  Right lower lobe nodule measuring 0.5 cm x 0.5 cm (2:127).  *  Right lower lobe nodule measuring 1.6 cm x 2 cm (2:104).  *  Left lower lobe nodule measuring 3 cm x 3.2 cm (2:108).  *  Left upper lobe nodule measuring 0.5 cm x 0.6 cm (2:72).  MEDIASTINUM AND GABRIELLA: No lymphadenopathy. HEART AND VESSELS: Heart size is normal. No pericardial effusion. CHEST WALL AND LOWER NECK: Wnl. VISUALIZED UPPER ABDOMEN: Wnl  BONES: Degenerative bone disease  IMPRESSION:  Multiple nodules involving the right lower lobe and both left lower and upper lobes, concerning for metastatic disease. If there are prior, outside CT exams of the chest they should be submitted for comparison.    MR Lumbar Spine w/wo IV Cont (08.05.20 @ 13:54)   INTERPRETATION:  Clinical indication: Grossly changes at L4-5 suspicious for discitis and osteomyelitis  Loss of the normal lumbar lordosis is seen.This could be due to positioning or muscle spasm.  There is abnormal T1 prolongation seen involving the L4 and L5 vertebral bodies, runs along endplate with abnormal enhancement involving L4-5 disc space as well. suspicious for discitis and osteomyelitis. Evidence of paraspinal extension of process suspected in left side. evidence of epidural enhancement. finding likely compatible with underlying phlegmon given no abnormal collections in this region.  evidence of abnormal collection with peripheral enhancement dorsal to superior endplate S1.   This could be compatible with early epidural abscess, continued close infarct is recommended; best seen on series 7 image 11 and measures approximately 1.7 x 0.5 cm.  T11-12: Normal T12-L1: Normal L1-2: Normal  L2-3: Disc bulge and bilateral hypertrophic facet changes are seen. Mild to moderate narrowing of the spinal canal is seen. Mild to moderate narrowing of the right neural foramen is seen.  L3-4: Disc bulge and bilateral hypertrophic facet joint changes are seen. Mild to moderate narrowing of the spinal canal is seen. Moderate narrowing of both neural foramen is seen right greater than left.  L4-5: Disc bulge and bilateral hypertrophic facet changes are seen. Moderate to severe narrowing of the spinal canal is seen. Severe narrowing of both neural foramenis seen.  L5-S1: Disc bulge without significant, spinal canal either neural foramen. The conus ends at the bottom of L1 and appears   Right-sided renal cyst is seen.  IMPRESSION:   Discitis/myelitis at L5-S1 level with some epidural and left paraspinal extension suspected. Evidence of a collection in ventral aspect of spinal canal at S1 level.    PROCEDURES:  IR Procedure - CT guided LLL lung mass core needle biopsy and L4-5 disc base aspiration (08.10.20 @ 18:11)  Clinical indication: New lung mass concerning for malignancy and  MR findings concerning for lumbar osteomyelitis. Diagnosis.  Technique: The left lower lobe lung mass, as identified on prior CT was targeted. Using CT guidance, a 17-gauge trocar needle was advanced into the left lower lobe lung mass. An 18-gauge core needle gun was advanced via the trocar into the left lower lobe lung mass, and 6 core specimens were obtained. The obtained specimens were deemed adequate by cytopathology technologist. Completion CT images demonstrated no acute complications. A dry sterile dressing was applied.   Technique: Transaxial images of lumbar spine were then obtained without use of oral or intravenous contrast. The L4-5 disc space findings concerning for osteomyelitis were again identified. Using CT guidance, an 18-gauge trocar needle was advanced into the L4-5 intervertebral disc space. A 22-gauge needle was advanced through the trocar into the disc space and two aspirations were performed. One sample was sent for microbiological analysis and the other for cytologic analysis. Completion CT images demonstrated no acute, patient's. A dry sterile dressing was applied.    Colonoscopy (08.12.20 @ 12:53)   Findings: perianal exam findings included perianal fistulas and ulcerations. A mass and fullness was felt upon digital rectal exam. A large infiltrative, friable, partially obstructing mass was found in  the rectum. The mass was circumferential and extended from the anal verge to approximately 17 cm from the anus. There was significant luminal narrowing at the proximal margin of the lesion. The colonoscope could not traverse this lesion; the colonoscope was withdrawn and an endoscope was inserted with successful traversal. Multiple biopsies of the mass were taken with a cold forceps for histology. A large amount of semi-liquid stool was found in the entire colon, precluding visualization. Multiple medium-mouthed diverticula were found in the sigmoid colon.                                                                             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 Likely malignant partially obstructing tumor in the rectum. Biopsied. Stool in the entire examined colon. No specimens collected.  Recommendation: Advance diet as tolerated. Follow up biopsy results. Will discuss with colorectal surgery if repeat colonoscopy needed at this time given concern for metastasis to lungs. Given partially obstructing lesion, closely monitor for symptoms of obstruction.

## 2020-08-13 NOTE — PROGRESS NOTE ADULT - PROBLEM SELECTOR PLAN 5
DVT - Lovenox 40 subq  Diet - NPO for colo, restart diet after  Activity - Ambulate as tolerated  Dispo: Pending workup  IMPROVE VTE Individual Risk Assessment        RISK                                                          Points  [  ] Previous VTE                                                3  [  ] Thrombophilia                                             2  [  ] Lower limb paralysis                                   2        (unable to hold up >15 seconds)    [  ] Current Cancer                                             2         (within 6 months)  [x  ] Immobilization > 24 hrs                              1  [  ] ICU/CCU stay > 24 hours                             1  [x] Age > 60                                                         1  IMPROVE VTE Score:         [2]  Total Risk Factor Score:    0 - 1:   Consider IPC  >2 - 3:  Thromboprophylaxis required (enoxaparin or SQ heparin)        >4:   High Risk: Thromboprophylaxis required (enoxaparin or SQ heparin), optional add IPC DVT - Lovenox 40 subq, will hold for surgery tomorrow  Diet - CLD for surg tomorrow, NPO at midnight  Activity - Ambulate as tolerated  Dispo: Pending workup  IMPROVE VTE Individual Risk Assessment        RISK                                                          Points  [  ] Previous VTE                                                3  [  ] Thrombophilia                                             2  [  ] Lower limb paralysis                                   2        (unable to hold up >15 seconds)    [  ] Current Cancer                                             2         (within 6 months)  [x  ] Immobilization > 24 hrs                              1  [  ] ICU/CCU stay > 24 hours                             1  [x] Age > 60                                                         1  IMPROVE VTE Score:         [2]  Total Risk Factor Score:    0 - 1:   Consider IPC  >2 - 3:  Thromboprophylaxis required (enoxaparin or SQ heparin)        >4:   High Risk: Thromboprophylaxis required (enoxaparin or SQ heparin), optional add IPC

## 2020-08-13 NOTE — PROGRESS NOTE ADULT - SUBJECTIVE AND OBJECTIVE BOX
Internal Medicine Progress Note    =======================================================  CONTACT KENYATTA Wolf M.D., PGY-1  Pager:  [phone] ([facility])  =======================================================    Patient is a 75y old  Male who presents with a chief complaint of Perianal discharge and low back pain x4 weeks (12 Aug 2020 10:57)      SUBJECTIVE / OVERNIGHT EVENTS:    MEDICATIONS  (STANDING):  enoxaparin Injectable 40 milliGRAM(s) SubCutaneous daily  ertapenem  IVPB 1000 milliGRAM(s) IV Intermittent every 24 hours  famotidine    Tablet 20 milliGRAM(s) Oral two times a day  lidocaine   Patch 1 Patch Transdermal daily  loratadine 10 milliGRAM(s) Oral daily  vancomycin  IVPB 1000 milliGRAM(s) IV Intermittent every 12 hours  zinc oxide 20% Ointment 1 Application(s) Topical every 8 hours    MEDICATIONS  (PRN):  acetaminophen   Tablet .. 650 milliGRAM(s) Oral every 6 hours PRN Temp greater or equal to 38C (100.4F), Mild Pain (1 - 3), Moderate Pain (4 - 6)  oxyCODONE    IR 5 milliGRAM(s) Oral every 6 hours PRN Moderate Pain (4 - 6)    Vital Signs Last 24 Hrs  T(C): 36.3 (13 Aug 2020 05:50), Max: 36.9 (12 Aug 2020 21:36)  T(F): 97.3 (13 Aug 2020 05:50), Max: 98.4 (12 Aug 2020 21:36)  HR: 72 (13 Aug 2020 05:50) (72 - 98)  BP: 122/80 (13 Aug 2020 05:50) (95/68 - 136/70)  BP(mean): 81 (12 Aug 2020 14:30) (81 - 105)  RR: 18 (13 Aug 2020 05:50) (14 - 19)  SpO2: 97% (13 Aug 2020 05:50) (95% - 99%)    CAPILLARY BLOOD GLUCOSE        I&O's Summary    11 Aug 2020 07:01  -  12 Aug 2020 07:00  --------------------------------------------------------  IN: 480 mL / OUT: 400 mL / NET: 80 mL        PHYSICAL EXAM  GENERAL: NAD  HEAD:  Atraumatic  EYES: EOMI, PERRLA, conjunctiva and sclera clear  NECK: Supple, No JVD  CHEST/LUNG: Clear to auscultation bilaterally; No wheeze  HEART: Regular rate and rhythm; No murmurs, rubs, or gallops  ABDOMEN: Soft, Nontender, Nondistended; Bowel sounds present  EXTREMITIES:  2+ Peripheral Pulses, No clubbing, cyanosis, or edema  NEURO/PSYCH: AAOx3, nonfocal  SKIN: No rashes or lesions      LABS:                        15.0   11.20 )-----------( 276      ( 12 Aug 2020 07:10 )             45.8     Hemoglobin: 15.0 g/dL (08-12 @ 07:10)  Hemoglobin: 14.6 g/dL (08-11 @ 06:05)  Hemoglobin: 14.1 g/dL (08-10 @ 06:30)  Hemoglobin: 13.6 g/dL (08-09 @ 06:40)  Hemoglobin: 14.9 g/dL (08-08 @ 10:18)    CBC Full  -  ( 12 Aug 2020 07:10 )  WBC Count : 11.20 K/uL  RBC Count : 4.87 M/uL  Hemoglobin : 15.0 g/dL  Hematocrit : 45.8 %  Platelet Count - Automated : 276 K/uL  Mean Cell Volume : 94.0 fL  Mean Cell Hemoglobin : 30.8 pg  Mean Cell Hemoglobin Concentration : 32.8 %  Auto Neutrophil # : x  Auto Lymphocyte # : x  Auto Monocyte # : x  Auto Eosinophil # : x  Auto Basophil # : x  Auto Neutrophil % : x  Auto Lymphocyte % : x  Auto Monocyte % : x  Auto Eosinophil % : x  Auto Basophil % : x    08-12    138  |  98  |  7   ----------------------------<  96  3.8   |  25  |  0.77    Ca    9.3      12 Aug 2020 07:10  Phos  2.9     08-12  Mg     1.9     08-12      Creatinine Trend: 0.77<--, 0.76<--, 0.74<--, 0.84<--, 0.79<--, 0.77<--        hs Troponin:              CSF:                      EKG:   MICROBIOLOGY:    Culture - Acid Fast - Tissue w/Smear (collected 11 Aug 2020 00:28)  Source: .Tissue L4-L5 VERTEBRAL DISC SPACE BIOPS  Preliminary Report (12 Aug 2020 15:04):    Culture is being performed.    Culture - Fungal, Tissue (collected 11 Aug 2020 00:28)  Source: .Tissue L4-L5 VERTEBRAL DISC SPACE BIOPS  Preliminary Report (11 Aug 2020 07:54):    Testing in progress    Culture - Tissue with Gram Stain (collected 11 Aug 2020 00:28)  Source: .Tissue L4-L5 VERTEBRAL DISC SPACE BIOPSY  Gram Stain (11 Aug 2020 04:06):    No polymorphonuclear cells seen per low power field    No organisms seen per oil power field  Preliminary Report (11 Aug 2020 21:12):    No growth      IMAGING:      Labs, imaging, EKG personally reviewed Internal Medicine Progress Note    =======================================================  CONTACT KENYATTA Wolf M.D., PGY-1  Pager:  [phone] ([facility])  =======================================================    Patient is a 75y old  Male who presents with a chief complaint of Perianal discharge and low back pain x4 weeks (12 Aug 2020 10:57)      SUBJECTIVE / OVERNIGHT EVENTS:  NAEO.    continues to endorse low back pain, feculent drainage per rectum.    MEDICATIONS  (STANDING):  enoxaparin Injectable 40 milliGRAM(s) SubCutaneous daily  ertapenem  IVPB 1000 milliGRAM(s) IV Intermittent every 24 hours  famotidine    Tablet 20 milliGRAM(s) Oral two times a day  lidocaine   Patch 1 Patch Transdermal daily  loratadine 10 milliGRAM(s) Oral daily  vancomycin  IVPB 1000 milliGRAM(s) IV Intermittent every 12 hours  zinc oxide 20% Ointment 1 Application(s) Topical every 8 hours    MEDICATIONS  (PRN):  acetaminophen   Tablet .. 650 milliGRAM(s) Oral every 6 hours PRN Temp greater or equal to 38C (100.4F), Mild Pain (1 - 3), Moderate Pain (4 - 6)  oxyCODONE    IR 5 milliGRAM(s) Oral every 6 hours PRN Moderate Pain (4 - 6)    Vital Signs Last 24 Hrs  T(C): 36.3 (13 Aug 2020 05:50), Max: 36.9 (12 Aug 2020 21:36)  T(F): 97.3 (13 Aug 2020 05:50), Max: 98.4 (12 Aug 2020 21:36)  HR: 72 (13 Aug 2020 05:50) (72 - 98)  BP: 122/80 (13 Aug 2020 05:50) (95/68 - 136/70)  BP(mean): 81 (12 Aug 2020 14:30) (81 - 105)  RR: 18 (13 Aug 2020 05:50) (14 - 19)  SpO2: 97% (13 Aug 2020 05:50) (95% - 99%)    CAPILLARY BLOOD GLUCOSE        I&O's Summary    11 Aug 2020 07:01  -  12 Aug 2020 07:00  --------------------------------------------------------  IN: 480 mL / OUT: 400 mL / NET: 80 mL        PHYSICAL EXAM  GENERAL: NAD  HEAD:  Atraumatic  EYES: EOMI, PERRLA, conjunctiva and sclera clear  NECK: Supple, No JVD  CHEST/LUNG: Clear to auscultation bilaterally; No wheeze  HEART: Regular rate and rhythm; No murmurs, rubs, or gallops  ABDOMEN: Soft, Nontender, Nondistended; Bowel sounds present  EXTREMITIES:  2+ Peripheral Pulses, No clubbing, cyanosis, or edema  NEURO/PSYCH: AAOx3, nonfocal  SKIN: No rashes or lesions      LABS:                        15.0   11.20 )-----------( 276      ( 12 Aug 2020 07:10 )             45.8     Hemoglobin: 15.0 g/dL (08-12 @ 07:10)  Hemoglobin: 14.6 g/dL (08-11 @ 06:05)  Hemoglobin: 14.1 g/dL (08-10 @ 06:30)  Hemoglobin: 13.6 g/dL (08-09 @ 06:40)  Hemoglobin: 14.9 g/dL (08-08 @ 10:18)    CBC Full  -  ( 12 Aug 2020 07:10 )  WBC Count : 11.20 K/uL  RBC Count : 4.87 M/uL  Hemoglobin : 15.0 g/dL  Hematocrit : 45.8 %  Platelet Count - Automated : 276 K/uL  Mean Cell Volume : 94.0 fL  Mean Cell Hemoglobin : 30.8 pg  Mean Cell Hemoglobin Concentration : 32.8 %  Auto Neutrophil # : x  Auto Lymphocyte # : x  Auto Monocyte # : x  Auto Eosinophil # : x  Auto Basophil # : x  Auto Neutrophil % : x  Auto Lymphocyte % : x  Auto Monocyte % : x  Auto Eosinophil % : x  Auto Basophil % : x    08-12    138  |  98  |  7   ----------------------------<  96  3.8   |  25  |  0.77    Ca    9.3      12 Aug 2020 07:10  Phos  2.9     08-12  Mg     1.9     08-12      Creatinine Trend: 0.77<--, 0.76<--, 0.74<--, 0.84<--, 0.79<--, 0.77<--        hs Troponin:              CSF:                      EKG:   MICROBIOLOGY:    Culture - Acid Fast - Tissue w/Smear (collected 11 Aug 2020 00:28)  Source: .Tissue L4-L5 VERTEBRAL DISC SPACE BIOPS  Preliminary Report (12 Aug 2020 15:04):    Culture is being performed.    Culture - Fungal, Tissue (collected 11 Aug 2020 00:28)  Source: .Tissue L4-L5 VERTEBRAL DISC SPACE BIOPS  Preliminary Report (11 Aug 2020 07:54):    Testing in progress    Culture - Tissue with Gram Stain (collected 11 Aug 2020 00:28)  Source: .Tissue L4-L5 VERTEBRAL DISC SPACE BIOPSY  Gram Stain (11 Aug 2020 04:06):    No polymorphonuclear cells seen per low power field    No organisms seen per oil power field  Preliminary Report (11 Aug 2020 21:12):    No growth      IMAGING:      Labs, imaging, EKG personally reviewed

## 2020-08-13 NOTE — PROGRESS NOTE ADULT - ASSESSMENT
75 M with history of Basal Cell Carcinoma of nose s/p surgical resection with nasal reconstruction who presents to the hospital with complaints of perianal rectal discharge and lower back pain x4 weeks  Leukocytosis, no fever  Feculent perianal discharge and low back pain  CT with concern for possible L1 OM, perianal fistula, pulmonary nodules  Uncertain if active infection, concern that radiographic findings all due to metastatic malignancy  MR with ? suspicion for OM, collection  S/p IR biopsy 8/11 to vertebral lesion  BCXs NGTD  Overall,  1) L1 Spinal lesion/OM  - Possible OM  - Vanco 1g q 12 (monitor levels)--trough 11 is adequate, continue  - Ertapenem 1g q 24  - F/U BCXs, F/U IR biopsy culture  2) Fistula  - Colonoscopy consistent with possible malignancy related to fistula  - F/U surgery  - OR planning per surgery team  3) Leukocytosis  - Monitor CBC, monitor for fevers    Champ Segovia MD  Pager [phone]  After 5pm and on weekends call [phone]

## 2020-08-13 NOTE — CHART NOTE - NSCHARTNOTEFT_GEN_A_CORE
Mr. Stephens is a low risk patient for a moderate risk procedure. He has no anginal symptoms nor dyspnea on exertion, able to walk multiple fights of stairs without problems. He has clinical evidence of PE such as leg swelling dyspnea, oxygen requirement, or chest pain. He has no orthopnea or paroxysmal nocturnal dyspnea. He has no report of palpitations or syncope. There is no documented past medical history of ACS, heart failure, pulmonary embolism, or malignant arrhythmia. RCRI score is 1, which estimates a 6% chance of mortality within 30 days of surgery. He denies sleep apnea. He has no family history of complications with anesthesia. On exam, vital signs WNL, no carotid murmur nor systolic murmur. Lungs clear without crackles. Mallampatti class 2. No lower extremity edema, swelling, or tenderness.    Mr. Stephens is medically optimized for surgery tomorrow. Case discussed with attending.    Rolly Layton, PGY2  Internal Medicine, pg [phone]

## 2020-08-13 NOTE — CONSULT NOTE ADULT - PROBLEM SELECTOR RECOMMENDATION 4
intermittent leukocytosis 2/2 underlying mets vs. infectious etiology  - 08.06 blood cultures x2 negative, final  - currently wbc wnl   - continue to trend cbc q24

## 2020-08-13 NOTE — CONSULT NOTE ADULT - PROBLEM SELECTOR RECOMMENDATION 9
colonoscopy 08/12: rectal mass c/o malignancy; regional changes associated w/ perianal fistula & lumbar spine erosive changes, possible OM  - lap sigmoid colostomy scheduled 08/13  - f/u results of rectal/mass bxs, lumbar spine bxs  - GI/CR surgery notes appreciated colonoscopy 08/12: rectal mass c/o malignancy; regional changes associated w/ perianal fistula & lumbar spine erosive changes, possible OM  - CEA 9.3, elevated  - lap sigmoid colostomy scheduled 08/13  - recommend MRI A/P for staging  - f/u results of rectal/mass bxs, lumbar spine bxs  - GI/CR surgery notes appreciated colonoscopy 08/12: rectal mass c/o malignancy; regional changes associated w/ perianal fistula & lumbar spine erosive changes, possible OM; CEA 9.3, elevated  - lap sigmoid colostomy scheduled 08/13  - recommend MRI A/P for staging  - f/u results of rectal/mass bxs, lumbar spine bxs  - GI/CR surgery notes appreciated colonoscopy 08/12: rectal mass c/o malignancy; regional changes associated w/ perianal fistula & lumbar spine erosive changes, possible OM; CEA 9.3, elevated  - lap sigmoid colostomy scheduled 08/13  - f/u results of rectal/mass bxs, lumbar spine bxs  - GI/CR surgery notes appreciated

## 2020-08-13 NOTE — PROGRESS NOTE ADULT - PROBLEM SELECTOR PLAN 1
Presented with weeks of feculent drainage. 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   - CEA elevated; GI consulted for colonoscopy for malignancy workup, pt amenable to inpatient colo. [place] on 8/12: Impression: Perianal fistula and ulceration found on perianal exam. Likely malignant partially obstructing tumor in the rectum. Biopsied. Stool in the entire examined colon. No specimens collected. Presented with weeks of feculent drainage. 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   - CEA elevated; GI consulted for colonoscopy for malignancy workup, pt amenable to inpatient colo. [place] on 8/12: Impression: Perianal fistula and ulceration found on perianal exam. Likely malignant partially obstructing tumor in the rectum. Biopsied. Stool in the entire examined colon. No specimens collected.  -surg with plan for sigmoid colostomy tomorrow. CLD today, NPO at midnight  -onc following

## 2020-08-13 NOTE — ADVANCED PRACTICE NURSE CONSULT - ASSESSMENT
Abdomen assessed in lying, sitting position. Stoma glenn made in LLQ, below umbilicus, impinging on the midline, avoiding creases/folds, within the rectus muscle. Explained stoma creation and pouch usage to patient. Patient did not have questions at this time about the ostomy. Wife at bedside during assessment and stoma glenn placement. Wife also did not have questions at this time. Will continue to monitor patient after surgery for post-operative ostomy teaching.

## 2020-08-13 NOTE — CONSULT NOTE ADULT - CONSULT REASON
Lung nodules
Lungs nodules; discitis/OM
abdominal pain
perianal fistula
rectal mass, multiple pulmonary nodules
Abnormal finding on imaging

## 2020-08-14 LAB
ANION GAP SERPL CALC-SCNC: 13 MMO/L — SIGNIFICANT CHANGE UP (ref 7–14)
APTT BLD: 28.7 SEC — SIGNIFICANT CHANGE UP (ref 27–36.3)
BLD GP AB SCN SERPL QL: NEGATIVE — SIGNIFICANT CHANGE UP
BUN SERPL-MCNC: 4 MG/DL — LOW (ref 7–23)
CALCIUM SERPL-MCNC: 9 MG/DL — SIGNIFICANT CHANGE UP (ref 8.4–10.5)
CHLORIDE SERPL-SCNC: 102 MMOL/L — SIGNIFICANT CHANGE UP (ref 98–107)
CO2 SERPL-SCNC: 25 MMOL/L — SIGNIFICANT CHANGE UP (ref 22–31)
CREAT SERPL-MCNC: 0.7 MG/DL — SIGNIFICANT CHANGE UP (ref 0.5–1.3)
GLUCOSE SERPL-MCNC: 116 MG/DL — HIGH (ref 70–99)
HCT VFR BLD CALC: 41.4 % — SIGNIFICANT CHANGE UP (ref 39–50)
HGB BLD-MCNC: 13.8 G/DL — SIGNIFICANT CHANGE UP (ref 13–17)
INR BLD: 1.36 — HIGH (ref 0.88–1.16)
MAGNESIUM SERPL-MCNC: 2 MG/DL — SIGNIFICANT CHANGE UP (ref 1.6–2.6)
MCHC RBC-ENTMCNC: 31.7 PG — SIGNIFICANT CHANGE UP (ref 27–34)
MCHC RBC-ENTMCNC: 33.3 % — SIGNIFICANT CHANGE UP (ref 32–36)
MCV RBC AUTO: 95.2 FL — SIGNIFICANT CHANGE UP (ref 80–100)
NRBC # FLD: 0 K/UL — SIGNIFICANT CHANGE UP (ref 0–0)
PHOSPHATE SERPL-MCNC: 3.2 MG/DL — SIGNIFICANT CHANGE UP (ref 2.5–4.5)
PLATELET # BLD AUTO: 233 K/UL — SIGNIFICANT CHANGE UP (ref 150–400)
PMV BLD: 9.8 FL — SIGNIFICANT CHANGE UP (ref 7–13)
POTASSIUM SERPL-MCNC: 3.6 MMOL/L — SIGNIFICANT CHANGE UP (ref 3.5–5.3)
POTASSIUM SERPL-SCNC: 3.6 MMOL/L — SIGNIFICANT CHANGE UP (ref 3.5–5.3)
PROTHROM AB SERPL-ACNC: 15.3 SEC — HIGH (ref 10.6–13.6)
RBC # BLD: 4.35 M/UL — SIGNIFICANT CHANGE UP (ref 4.2–5.8)
RBC # FLD: 13.8 % — SIGNIFICANT CHANGE UP (ref 10.3–14.5)
RH IG SCN BLD-IMP: NEGATIVE — SIGNIFICANT CHANGE UP
SODIUM SERPL-SCNC: 140 MMOL/L — SIGNIFICANT CHANGE UP (ref 135–145)
VANCOMYCIN FLD-MCNC: 14.9 UG/ML — SIGNIFICANT CHANGE UP
WBC # BLD: 7.01 K/UL — SIGNIFICANT CHANGE UP (ref 3.8–10.5)
WBC # FLD AUTO: 7.01 K/UL — SIGNIFICANT CHANGE UP (ref 3.8–10.5)

## 2020-08-14 PROCEDURE — 99233 SBSQ HOSP IP/OBS HIGH 50: CPT | Mod: GC

## 2020-08-14 PROCEDURE — 44320 COLOSTOMY: CPT | Mod: 79

## 2020-08-14 PROCEDURE — 49585: CPT | Mod: 79

## 2020-08-14 RX ORDER — HYDROMORPHONE HYDROCHLORIDE 2 MG/ML
0.5 INJECTION INTRAMUSCULAR; INTRAVENOUS; SUBCUTANEOUS EVERY 4 HOURS
Refills: 0 | Status: DISCONTINUED | OUTPATIENT
Start: 2020-08-14 | End: 2020-08-19

## 2020-08-14 RX ORDER — SODIUM CHLORIDE 9 MG/ML
1000 INJECTION, SOLUTION INTRAVENOUS
Refills: 0 | Status: DISCONTINUED | OUTPATIENT
Start: 2020-08-14 | End: 2020-08-15

## 2020-08-14 RX ORDER — FENTANYL CITRATE 50 UG/ML
50 INJECTION INTRAVENOUS
Refills: 0 | Status: DISCONTINUED | OUTPATIENT
Start: 2020-08-14 | End: 2020-08-14

## 2020-08-14 RX ORDER — HEPARIN SODIUM 5000 [USP'U]/ML
5000 INJECTION INTRAVENOUS; SUBCUTANEOUS EVERY 8 HOURS
Refills: 0 | Status: DISCONTINUED | OUTPATIENT
Start: 2020-08-14 | End: 2020-08-15

## 2020-08-14 RX ORDER — HYDROMORPHONE HYDROCHLORIDE 2 MG/ML
0.5 INJECTION INTRAMUSCULAR; INTRAVENOUS; SUBCUTANEOUS
Refills: 0 | Status: DISCONTINUED | OUTPATIENT
Start: 2020-08-14 | End: 2020-08-14

## 2020-08-14 RX ORDER — KETOROLAC TROMETHAMINE 30 MG/ML
15 SYRINGE (ML) INJECTION EVERY 6 HOURS
Refills: 0 | Status: DISCONTINUED | OUTPATIENT
Start: 2020-08-14 | End: 2020-08-15

## 2020-08-14 RX ORDER — ACETAMINOPHEN 500 MG
1000 TABLET ORAL EVERY 6 HOURS
Refills: 0 | Status: COMPLETED | OUTPATIENT
Start: 2020-08-14 | End: 2020-08-15

## 2020-08-14 RX ORDER — OXYCODONE HYDROCHLORIDE 5 MG/1
5 TABLET ORAL EVERY 6 HOURS
Refills: 0 | Status: DISCONTINUED | OUTPATIENT
Start: 2020-08-14 | End: 2020-08-19

## 2020-08-14 RX ORDER — ONDANSETRON 8 MG/1
4 TABLET, FILM COATED ORAL ONCE
Refills: 0 | Status: DISCONTINUED | OUTPATIENT
Start: 2020-08-14 | End: 2020-08-19

## 2020-08-14 RX ADMIN — NEOMYCIN SULFATE 500 MILLIGRAM(S): 500 TABLET ORAL at 01:50

## 2020-08-14 RX ADMIN — FENTANYL CITRATE 50 MICROGRAM(S): 50 INJECTION INTRAVENOUS at 13:00

## 2020-08-14 RX ADMIN — FENTANYL CITRATE 50 MICROGRAM(S): 50 INJECTION INTRAVENOUS at 12:45

## 2020-08-14 RX ADMIN — OXYCODONE HYDROCHLORIDE 5 MILLIGRAM(S): 5 TABLET ORAL at 21:53

## 2020-08-14 RX ADMIN — Medication 250 MILLIGRAM(S): at 10:25

## 2020-08-14 RX ADMIN — Medication 1000 MILLIGRAM(S): at 18:30

## 2020-08-14 RX ADMIN — HEPARIN SODIUM 5000 UNIT(S): 5000 INJECTION INTRAVENOUS; SUBCUTANEOUS at 21:15

## 2020-08-14 RX ADMIN — SODIUM CHLORIDE 40 MILLILITER(S): 9 INJECTION, SOLUTION INTRAVENOUS at 12:41

## 2020-08-14 RX ADMIN — Medication 15 MILLIGRAM(S): at 18:15

## 2020-08-14 RX ADMIN — Medication 250 MILLIGRAM(S): at 01:50

## 2020-08-14 RX ADMIN — HYDROMORPHONE HYDROCHLORIDE 0.5 MILLIGRAM(S): 2 INJECTION INTRAMUSCULAR; INTRAVENOUS; SUBCUTANEOUS at 13:40

## 2020-08-14 RX ADMIN — ZINC OXIDE 1 APPLICATION(S): 200 OINTMENT TOPICAL at 05:38

## 2020-08-14 RX ADMIN — HYDROMORPHONE HYDROCHLORIDE 0.5 MILLIGRAM(S): 2 INJECTION INTRAMUSCULAR; INTRAVENOUS; SUBCUTANEOUS at 16:39

## 2020-08-14 RX ADMIN — ERTAPENEM SODIUM 120 MILLIGRAM(S): 1 INJECTION, POWDER, LYOPHILIZED, FOR SOLUTION INTRAMUSCULAR; INTRAVENOUS at 21:15

## 2020-08-14 RX ADMIN — Medication 15 MILLIGRAM(S): at 18:30

## 2020-08-14 RX ADMIN — OXYCODONE HYDROCHLORIDE 5 MILLIGRAM(S): 5 TABLET ORAL at 21:23

## 2020-08-14 RX ADMIN — Medication 250 MILLIGRAM(S): at 22:14

## 2020-08-14 RX ADMIN — Medication 400 MILLIGRAM(S): at 18:15

## 2020-08-14 RX ADMIN — HYDROMORPHONE HYDROCHLORIDE 0.5 MILLIGRAM(S): 2 INJECTION INTRAMUSCULAR; INTRAVENOUS; SUBCUTANEOUS at 16:54

## 2020-08-14 RX ADMIN — SODIUM CHLORIDE 125 MILLILITER(S): 9 INJECTION, SOLUTION INTRAVENOUS at 06:09

## 2020-08-14 RX ADMIN — HYDROMORPHONE HYDROCHLORIDE 0.5 MILLIGRAM(S): 2 INJECTION INTRAMUSCULAR; INTRAVENOUS; SUBCUTANEOUS at 13:25

## 2020-08-14 NOTE — BRIEF OPERATIVE NOTE - NSICDXBRIEFPOSTOP_GEN_ALL_CORE_FT
POST-OP DIAGNOSIS:  Rectal mass 07-Aug-2020 20:43:55  Jose Daniel Camacho
POST-OP DIAGNOSIS:  Rectal mass 07-Aug-2020 20:43:55  Jose Daniel Camacho

## 2020-08-14 NOTE — BRIEF OPERATIVE NOTE - NSICDXBRIEFPROCEDURE_GEN_ALL_CORE_FT
PROCEDURES:  Biopsy, rectal 07-Aug-2020 20:43:23  Jose Daniel Camacho  Rectal EUA 07-Aug-2020 20:43:14  Jose Daniel Camacho
PROCEDURES:  Open repair of umbilical hernia in adult 14-Aug-2020 12:19:33  Pa Emmanuel  Laparoscopic sigmoid loop colostomy 14-Aug-2020 12:19:17  Pa Emmanuel

## 2020-08-14 NOTE — PROGRESS NOTE ADULT - SUBJECTIVE AND OBJECTIVE BOX
GENERAL SURGERY DAILY PROGRESS NOTE:       Subjective: Patient examined at bedside. No acute events overnight.       Objective:    Vital Signs Last 24 Hrs  T(C): 36.9 (14 Aug 2020 07:35), Max: 36.9 (14 Aug 2020 05:42)  T(F): 98.5 (14 Aug 2020 07:35), Max: 98.5 (14 Aug 2020 07:35)  HR: 71 (14 Aug 2020 07:35) (70 - 84)  BP: 127/82 (14 Aug 2020 06:59) (120/71 - 127/82)  BP(mean): --  RR: 16 (14 Aug 2020 07:35) (16 - 18)  SpO2: 96% (14 Aug 2020 07:35) (96% - 100%)    I&O's Detail    13 Aug 2020 07:01  -  14 Aug 2020 07:00  --------------------------------------------------------  IN:    dextrose 5% + sodium chloride 0.45%.: 1125 mL  Total IN: 1125 mL    OUT:    Voided: 1050 mL  Total OUT: 1050 mL    Total NET: 75 mL          Physical Exam:    General: NAD, well-nourished  HEENT: Atraumatic, EOMI  Resp: Breathing comfortably on RA  CV: Normal sinus rhythm  Abd: soft,   Ext: ROMIx4, motor strength intact x 4      Labaratory Results:                          13.8   7.01  )-----------( 233      ( 14 Aug 2020 07:00 )             41.4     08-13    139  |  102  |  7   ----------------------------<  98  4.0   |  25  |  0.75    Ca    9.2      13 Aug 2020 06:30  Phos  3.5     08-13  Mg     1.9     08-13            Radiology and Additional Tests:    Medications:    MEDICATIONS  (STANDING):  dextrose 5% + sodium chloride 0.45%. 1000 milliLiter(s) (125 mL/Hr) IV Continuous <Continuous>  ertapenem  IVPB 1000 milliGRAM(s) IV Intermittent every 24 hours  lidocaine   Patch 1 Patch Transdermal daily  loratadine 10 milliGRAM(s) Oral daily  vancomycin  IVPB 1000 milliGRAM(s) IV Intermittent every 12 hours  zinc oxide 20% Ointment 1 Application(s) Topical every 8 hours    MEDICATIONS  (PRN):  acetaminophen   Tablet .. 650 milliGRAM(s) Oral every 6 hours PRN Temp greater or equal to 38C (100.4F), Mild Pain (1 - 3), Moderate Pain (4 - 6)  oxyCODONE    IR 5 milliGRAM(s) Oral every 6 hours PRN Severe Pain (7 - 10)  saline laxative (FLEET) Rectal Enema 1 Enema Rectal once PRN Bowel prep for Colon Surgery GENERAL SURGERY DAILY PROGRESS NOTE:       Subjective: Patient examined at bedside. No acute events overnight. OR today for sigmoid colostomy.       Objective:    Vital Signs Last 24 Hrs  T(C): 36.9 (14 Aug 2020 07:35), Max: 36.9 (14 Aug 2020 05:42)  T(F): 98.5 (14 Aug 2020 07:35), Max: 98.5 (14 Aug 2020 07:35)  HR: 71 (14 Aug 2020 07:35) (70 - 84)  BP: 127/82 (14 Aug 2020 06:59) (120/71 - 127/82)  BP(mean): --  RR: 16 (14 Aug 2020 07:35) (16 - 18)  SpO2: 96% (14 Aug 2020 07:35) (96% - 100%)    I&O's Detail    13 Aug 2020 07:01  -  14 Aug 2020 07:00  --------------------------------------------------------  IN:    dextrose 5% + sodium chloride 0.45%.: 1125 mL  Total IN: 1125 mL    OUT:    Voided: 1050 mL  Total OUT: 1050 mL    Total NET: 75 mL      Physical Exam:    General: NAD, laying comfortably in bed  Resp: no increased work of breathing  Abd: soft, non distended, non tender, no rebound or guarding  Ext: warm and well perfused, grossly symmetric      Labaratory Results:                          13.8   7.01  )-----------( 233      ( 14 Aug 2020 07:00 )             41.4     08-13    139  |  102  |  7   ----------------------------<  98  4.0   |  25  |  0.75    Ca    9.2      13 Aug 2020 06:30  Phos  3.5     08-13  Mg     1.9     08-13            Radiology and Additional Tests:    Medications:    MEDICATIONS  (STANDING):  dextrose 5% + sodium chloride 0.45%. 1000 milliLiter(s) (125 mL/Hr) IV Continuous <Continuous>  ertapenem  IVPB 1000 milliGRAM(s) IV Intermittent every 24 hours  lidocaine   Patch 1 Patch Transdermal daily  loratadine 10 milliGRAM(s) Oral daily  vancomycin  IVPB 1000 milliGRAM(s) IV Intermittent every 12 hours  zinc oxide 20% Ointment 1 Application(s) Topical every 8 hours    MEDICATIONS  (PRN):  acetaminophen   Tablet .. 650 milliGRAM(s) Oral every 6 hours PRN Temp greater or equal to 38C (100.4F), Mild Pain (1 - 3), Moderate Pain (4 - 6)  oxyCODONE    IR 5 milliGRAM(s) Oral every 6 hours PRN Severe Pain (7 - 10)  saline laxative (FLEET) Rectal Enema 1 Enema Rectal once PRN Bowel prep for Colon Surgery

## 2020-08-14 NOTE — PROGRESS NOTE ADULT - ASSESSMENT
75 Year-Old Gentleman with complex perianal fistula, with additional concern for metastatic cancer of unknown primary origin, now s/p EUS and biopsy on 8/7, and s/p CT guided core needle lung mass biopsy by IR, GI colonoscopy and biopsy, plan for OR today for sigmoid colostomy.    PLAN:  -OR today  -NPO/IVF  -Ertapenem/vanco  -DVT ppx  -F/U GI  -F/U IR    A Surgery  [id number]

## 2020-08-14 NOTE — PROGRESS NOTE ADULT - PROBLEM SELECTOR PLAN 5
DVT - Lovenox 40 subq, will hold for surgery tomorrow  Diet - CLD for surg tomorrow, NPO at midnight  Activity - Ambulate as tolerated  Dispo: Pending workup  IMPROVE VTE Individual Risk Assessment        RISK                                                          Points  [  ] Previous VTE                                                3  [  ] Thrombophilia                                             2  [  ] Lower limb paralysis                                   2        (unable to hold up >15 seconds)    [  ] Current Cancer                                             2         (within 6 months)  [x  ] Immobilization > 24 hrs                              1  [  ] ICU/CCU stay > 24 hours                             1  [x] Age > 60                                                         1  IMPROVE VTE Score:         [2]  Total Risk Factor Score:    0 - 1:   Consider IPC  >2 - 3:  Thromboprophylaxis required (enoxaparin or SQ heparin)        >4:   High Risk: Thromboprophylaxis required (enoxaparin or SQ heparin), optional add IPC DVT - Lovenox 40 subq, will hold for surgery tomorrow  Diet - CLD for surg   Activity - Ambulate as tolerated  Dispo: Pending workup    Transfer of care to surgery team A. DVT - Lovenox 40 subq, held for surgery today  Diet - CLD for surg   Activity - Ambulate as tolerated  Dispo: Pending workup    Transfer of care to surgery team A.

## 2020-08-14 NOTE — BRIEF OPERATIVE NOTE - OPERATION/FINDINGS
Left colon and sigmoid mobilized laparoscopically, brought to abdominal wall.   Diverting loop sigmoid ostomy matured in Rajni fashion.   Primary repair of umbilical hernia. Left colon and sigmoid mobilized laparoscopically, brought to abdominal wall.   Diverting loop sigmoid ostomy matured in Rajni fashion, supported with crossing skin bridge.   Primary repair of umbilical hernia. Left colon and sigmoid mobilized laparoscopically, brought to abdominal wall.   Diverting loop sigmoid ostomy matured in Rajni fashion, supported with crossing skin bridge.  Primary repair of umbilical hernia.

## 2020-08-14 NOTE — PROGRESS NOTE ADULT - ASSESSMENT
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 Also with pulmonary nodules of unclear etiology. S/p EUA w/ bx by surgery on 8/7. S/p lung/spine biopsy w/ IR on 8/10. S/p Colonoscopy w/ GI on 8/12. 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 Also with pulmonary nodules of unclear etiology. S/p EUA w/ bx by surgery on 8/7. S/p lung/spine biopsy w/ IR on 8/10. S/p Colonoscopy w/ GI on 8/12. s/p colostomy on 8/14.

## 2020-08-14 NOTE — PROGRESS NOTE ADULT - ATTENDING COMMENTS
No acute events overnight. S/p uncomplicated laparoscopic diverting loop sigmoid colostomy and umbilical hernia repair this morning,    - transfer to surgery service  - CLD  - garcia out in the AM   - f/u biopsy results   - med/onc following (appreciate assistance)    Aziza Valdez MD  Attending Physician  Colorectal Surgery

## 2020-08-14 NOTE — PROGRESS NOTE ADULT - SUBJECTIVE AND OBJECTIVE BOX
Internal Medicine Progress Note    =======================================================  CONTACT KENYATTA Wolf M.D., PGY-1  Pager:  [phone] ([facility])  =======================================================    Patient is a 75y old  Male who presents with a chief complaint of Perianal discharge and low back pain x4 weeks (13 Aug 2020 11:48)      SUBJECTIVE / OVERNIGHT EVENTS:    MEDICATIONS  (STANDING):  dextrose 5% + sodium chloride 0.45%. 1000 milliLiter(s) (125 mL/Hr) IV Continuous <Continuous>  ertapenem  IVPB 1000 milliGRAM(s) IV Intermittent every 24 hours  lidocaine   Patch 1 Patch Transdermal daily  loratadine 10 milliGRAM(s) Oral daily  vancomycin  IVPB 1000 milliGRAM(s) IV Intermittent every 12 hours  zinc oxide 20% Ointment 1 Application(s) Topical every 8 hours    MEDICATIONS  (PRN):  acetaminophen   Tablet .. 650 milliGRAM(s) Oral every 6 hours PRN Temp greater or equal to 38C (100.4F), Mild Pain (1 - 3), Moderate Pain (4 - 6)  oxyCODONE    IR 5 milliGRAM(s) Oral every 6 hours PRN Severe Pain (7 - 10)  saline laxative (FLEET) Rectal Enema 1 Enema Rectal once PRN Bowel prep for Colon Surgery    Vital Signs Last 24 Hrs  T(C): 36.9 (14 Aug 2020 06:59), Max: 36.9 (14 Aug 2020 05:42)  T(F): 98.4 (14 Aug 2020 06:59), Max: 98.4 (14 Aug 2020 05:42)  HR: 72 (14 Aug 2020 06:59) (70 - 84)  BP: 127/82 (14 Aug 2020 06:59) (120/71 - 127/82)  BP(mean): --  RR: 17 (14 Aug 2020 06:59) (16 - 18)  SpO2: 100% (14 Aug 2020 05:42) (96% - 100%)    CAPILLARY BLOOD GLUCOSE        I&O's Summary    13 Aug 2020 07:01  -  14 Aug 2020 07:00  --------------------------------------------------------  IN: 1125 mL / OUT: 1050 mL / NET: 75 mL        PHYSICAL EXAM  GENERAL: NAD  HEAD:  Atraumatic  EYES: EOMI, PERRLA, conjunctiva and sclera clear  NECK: Supple, No JVD  CHEST/LUNG: Clear to auscultation bilaterally; No wheeze  HEART: Regular rate and rhythm; No murmurs, rubs, or gallops  ABDOMEN: Soft, Nontender, Nondistended; Bowel sounds present  EXTREMITIES:  2+ Peripheral Pulses, No clubbing, cyanosis, or edema  NEURO/PSYCH: AAOx3, nonfocal  SKIN: No rashes or lesions      LABS:                        14.3   7.30  )-----------( 259      ( 13 Aug 2020 06:30 )             42.9     Hemoglobin: 14.3 g/dL (08-13 @ 06:30)  Hemoglobin: 15.0 g/dL (08-12 @ 07:10)  Hemoglobin: 14.6 g/dL (08-11 @ 06:05)  Hemoglobin: 14.1 g/dL (08-10 @ 06:30)    CBC Full  -  ( 13 Aug 2020 06:30 )  WBC Count : 7.30 K/uL  RBC Count : 4.49 M/uL  Hemoglobin : 14.3 g/dL  Hematocrit : 42.9 %  Platelet Count - Automated : 259 K/uL  Mean Cell Volume : 95.5 fL  Mean Cell Hemoglobin : 31.8 pg  Mean Cell Hemoglobin Concentration : 33.3 %  Auto Neutrophil # : x  Auto Lymphocyte # : x  Auto Monocyte # : x  Auto Eosinophil # : x  Auto Basophil # : x  Auto Neutrophil % : x  Auto Lymphocyte % : x  Auto Monocyte % : x  Auto Eosinophil % : x  Auto Basophil % : x    08-13    139  |  102  |  7   ----------------------------<  98  4.0   |  25  |  0.75    Ca    9.2      13 Aug 2020 06:30  Phos  3.5     08-13  Mg     1.9     08-13      Creatinine Trend: 0.75<--, 0.77<--, 0.76<--, 0.74<--, 0.84<--, 0.79<--        hs Troponin:              CSF:                      EKG:   MICROBIOLOGY:    IMAGING:      Labs, imaging, EKG personally reviewed Internal Medicine Progress Note    =======================================================  CONTACT KENYATTA Wolf M.D., PGY-1  Pager:  [phone] ([facility])  =======================================================    Patient is a 75y old  Male who presents with a chief complaint of Perianal discharge and low back pain x4 weeks (13 Aug 2020 11:48)      SUBJECTIVE / OVERNIGHT EVENTS:    NAEO.    Could not interview pt this am as he was at surgery.    MEDICATIONS  (STANDING):  dextrose 5% + sodium chloride 0.45%. 1000 milliLiter(s) (125 mL/Hr) IV Continuous <Continuous>  ertapenem  IVPB 1000 milliGRAM(s) IV Intermittent every 24 hours  lidocaine   Patch 1 Patch Transdermal daily  loratadine 10 milliGRAM(s) Oral daily  vancomycin  IVPB 1000 milliGRAM(s) IV Intermittent every 12 hours  zinc oxide 20% Ointment 1 Application(s) Topical every 8 hours    MEDICATIONS  (PRN):  acetaminophen   Tablet .. 650 milliGRAM(s) Oral every 6 hours PRN Temp greater or equal to 38C (100.4F), Mild Pain (1 - 3), Moderate Pain (4 - 6)  oxyCODONE    IR 5 milliGRAM(s) Oral every 6 hours PRN Severe Pain (7 - 10)  saline laxative (FLEET) Rectal Enema 1 Enema Rectal once PRN Bowel prep for Colon Surgery    Vital Signs Last 24 Hrs  T(C): 36.9 (14 Aug 2020 06:59), Max: 36.9 (14 Aug 2020 05:42)  T(F): 98.4 (14 Aug 2020 06:59), Max: 98.4 (14 Aug 2020 05:42)  HR: 72 (14 Aug 2020 06:59) (70 - 84)  BP: 127/82 (14 Aug 2020 06:59) (120/71 - 127/82)  BP(mean): --  RR: 17 (14 Aug 2020 06:59) (16 - 18)  SpO2: 100% (14 Aug 2020 05:42) (96% - 100%)    CAPILLARY BLOOD GLUCOSE        I&O's Summary    13 Aug 2020 07:01  -  14 Aug 2020 07:00  --------------------------------------------------------  IN: 1125 mL / OUT: 1050 mL / NET: 75 mL        PHYSICAL EXAM  GENERAL: NAD  HEAD:  Atraumatic  EYES: EOMI, PERRLA, conjunctiva and sclera clear  NECK: Supple, No JVD  CHEST/LUNG: Clear to auscultation bilaterally; No wheeze  HEART: Regular rate and rhythm; No murmurs, rubs, or gallops  ABDOMEN: Soft, Nontender, Nondistended; Bowel sounds present  EXTREMITIES:  2+ Peripheral Pulses, No clubbing, cyanosis, or edema  NEURO/PSYCH: AAOx3, nonfocal  SKIN: No rashes or lesions      LABS:                        14.3   7.30  )-----------( 259      ( 13 Aug 2020 06:30 )             42.9     Hemoglobin: 14.3 g/dL (08-13 @ 06:30)  Hemoglobin: 15.0 g/dL (08-12 @ 07:10)  Hemoglobin: 14.6 g/dL (08-11 @ 06:05)  Hemoglobin: 14.1 g/dL (08-10 @ 06:30)    CBC Full  -  ( 13 Aug 2020 06:30 )  WBC Count : 7.30 K/uL  RBC Count : 4.49 M/uL  Hemoglobin : 14.3 g/dL  Hematocrit : 42.9 %  Platelet Count - Automated : 259 K/uL  Mean Cell Volume : 95.5 fL  Mean Cell Hemoglobin : 31.8 pg  Mean Cell Hemoglobin Concentration : 33.3 %  Auto Neutrophil # : x  Auto Lymphocyte # : x  Auto Monocyte # : x  Auto Eosinophil # : x  Auto Basophil # : x  Auto Neutrophil % : x  Auto Lymphocyte % : x  Auto Monocyte % : x  Auto Eosinophil % : x  Auto Basophil % : x    08-13    139  |  102  |  7   ----------------------------<  98  4.0   |  25  |  0.75    Ca    9.2      13 Aug 2020 06:30  Phos  3.5     08-13  Mg     1.9     08-13      Creatinine Trend: 0.75<--, 0.77<--, 0.76<--, 0.74<--, 0.84<--, 0.79<--        hs Troponin:              CSF:                      EKG:   MICROBIOLOGY:    IMAGING:      Labs, imaging, EKG personally reviewed

## 2020-08-14 NOTE — CHART NOTE - NSCHARTNOTEFT_GEN_A_CORE
GENERAL SURGERY POST-OP NOTE:    Status post: open umbilical hernia repair, Laparoscopic sigmoid loop colostomy     Subjective:      Objective:    MEDICATIONS  (STANDING):  acetaminophen  IVPB .. 1000 milliGRAM(s) IV Intermittent every 6 hours  ertapenem  IVPB 1000 milliGRAM(s) IV Intermittent every 24 hours  heparin   Injectable 5000 Unit(s) SubCutaneous every 8 hours  ketorolac   Injectable 15 milliGRAM(s) IV Push every 6 hours  lactated ringers. 1000 milliLiter(s) (40 mL/Hr) IV Continuous <Continuous>  lidocaine   Patch 1 Patch Transdermal daily  loratadine 10 milliGRAM(s) Oral daily  vancomycin  IVPB 1000 milliGRAM(s) IV Intermittent every 12 hours  zinc oxide 20% Ointment 1 Application(s) Topical every 8 hours    MEDICATIONS  (PRN):  acetaminophen   Tablet .. 650 milliGRAM(s) Oral every 6 hours PRN Temp greater or equal to 38C (100.4F), Mild Pain (1 - 3), Moderate Pain (4 - 6)  ondansetron Injectable 4 milliGRAM(s) IV Push once PRN Nausea and/or Vomiting  oxyCODONE    IR 5 milliGRAM(s) Oral every 6 hours PRN Severe Pain (7 - 10)      Vital Signs Last 24 Hrs  T(C): 36.6 (14 Aug 2020 14:00), Max: 36.9 (14 Aug 2020 05:42)  T(F): 97.9 (14 Aug 2020 14:00), Max: 98.5 (14 Aug 2020 07:35)  HR: 65 (14 Aug 2020 14:00) (63 - 89)  BP: 107/61 (14 Aug 2020 14:00) (107/61 - 141/78)  BP(mean): 72 (14 Aug 2020 14:00) (70 - 94)  RR: 12 (14 Aug 2020 14:00) (12 - 20)  SpO2: 95% (14 Aug 2020 14:00) (94% - 100%)    I&O's Detail    13 Aug 2020 07:01  -  14 Aug 2020 07:00  --------------------------------------------------------  IN:    dextrose 5% + sodium chloride 0.45%.: 1125 mL  Total IN: 1125 mL    OUT:    Voided: 1050 mL  Total OUT: 1050 mL    Total NET: 75 mL      14 Aug 2020 07:01  -  14 Aug 2020 15:54  --------------------------------------------------------  IN:    lactated ringers.: 80 mL    Oral Fluid: 30 mL  Total IN: 110 mL    OUT:    Indwelling Catheter - Urethral: 145 mL  Total OUT: 145 mL    Total NET: -35 mL          Daily Height in cm: 177.8 (14 Aug 2020 06:59)    Daily     LABS:                        13.8   7.01  )-----------( 233      ( 14 Aug 2020 07:00 )             41.4     08-14    140  |  102  |  4<L>  ----------------------------<  116<H>  3.6   |  25  |  0.70    Ca    9.0      14 Aug 2020 07:00  Phos  3.2     08-14  Mg     2.0     08-14      PT/INR - ( 14 Aug 2020 07:00 )   PT: 15.3 SEC;   INR: 1.36          PTT - ( 14 Aug 2020 07:00 )  PTT:28.7 SEC      PHYSICAL EXAM:  Gen:  Resp:  Abd:  Ext:    A:  75 Year-Old Gentleman with complex perianal fistula, with additional concern for metastatic cancer of unknown primary origin, now s/p EUS and biopsy on 8/7, and s/p CT guided core needle lung mass biopsy by IR, GI colonoscopy and biopsy, now s/p lap sigmoid colostomy and umbilical hernia repair on 8/14.    P: GENERAL SURGERY POST-OP NOTE:    Status post: open umbilical hernia repair, Laparoscopic sigmoid loop colostomy     Subjective: Patient was seen following his procedure.  Endorses pain, controlled with medication. Leos in place with clear yellow urine.  No gas in bag, some bowel sweat in bag.  Non ambulating.  Denies fever/chills, CP/SOB.      Objective:    MEDICATIONS  (STANDING):  acetaminophen  IVPB .. 1000 milliGRAM(s) IV Intermittent every 6 hours  ertapenem  IVPB 1000 milliGRAM(s) IV Intermittent every 24 hours  heparin   Injectable 5000 Unit(s) SubCutaneous every 8 hours  ketorolac   Injectable 15 milliGRAM(s) IV Push every 6 hours  lactated ringers. 1000 milliLiter(s) (40 mL/Hr) IV Continuous <Continuous>  lidocaine   Patch 1 Patch Transdermal daily  loratadine 10 milliGRAM(s) Oral daily  vancomycin  IVPB 1000 milliGRAM(s) IV Intermittent every 12 hours  zinc oxide 20% Ointment 1 Application(s) Topical every 8 hours    MEDICATIONS  (PRN):  acetaminophen   Tablet .. 650 milliGRAM(s) Oral every 6 hours PRN Temp greater or equal to 38C (100.4F), Mild Pain (1 - 3), Moderate Pain (4 - 6)  ondansetron Injectable 4 milliGRAM(s) IV Push once PRN Nausea and/or Vomiting  oxyCODONE    IR 5 milliGRAM(s) Oral every 6 hours PRN Severe Pain (7 - 10)      Vital Signs Last 24 Hrs  T(C): 36.6 (14 Aug 2020 14:00), Max: 36.9 (14 Aug 2020 05:42)  T(F): 97.9 (14 Aug 2020 14:00), Max: 98.5 (14 Aug 2020 07:35)  HR: 65 (14 Aug 2020 14:00) (63 - 89)  BP: 107/61 (14 Aug 2020 14:00) (107/61 - 141/78)  BP(mean): 72 (14 Aug 2020 14:00) (70 - 94)  RR: 12 (14 Aug 2020 14:00) (12 - 20)  SpO2: 95% (14 Aug 2020 14:00) (94% - 100%)    I&O's Detail    13 Aug 2020 07:01  -  14 Aug 2020 07:00  --------------------------------------------------------  IN:    dextrose 5% + sodium chloride 0.45%.: 1125 mL  Total IN: 1125 mL    OUT:    Voided: 1050 mL  Total OUT: 1050 mL    Total NET: 75 mL      14 Aug 2020 07:01  -  14 Aug 2020 15:54  --------------------------------------------------------  IN:    lactated ringers.: 80 mL    Oral Fluid: 30 mL  Total IN: 110 mL    OUT:    Indwelling Catheter - Urethral: 145 mL  Total OUT: 145 mL    Total NET: -35 mL          Daily Height in cm: 177.8 (14 Aug 2020 06:59)    Daily     LABS:                        13.8   7.01  )-----------( 233      ( 14 Aug 2020 07:00 )             41.4     08-14    140  |  102  |  4<L>  ----------------------------<  116<H>  3.6   |  25  |  0.70    Ca    9.0      14 Aug 2020 07:00  Phos  3.2     08-14  Mg     2.0     08-14      PT/INR - ( 14 Aug 2020 07:00 )   PT: 15.3 SEC;   INR: 1.36          PTT - ( 14 Aug 2020 07:00 )  PTT:28.7 SEC      PHYSICAL EXAM:  Gen: NAD, laying comfortably in bed  Resp: no increased work of breathing  Abd: soft, non-distended, appropriately tender to palpation, ostomy pink and viable, no rebound or guarding  Ext: warm and well perfused, grossly symmetric     A:  75 Year-Old Gentleman with complex perianal fistula, with additional concern for metastatic cancer of unknown primary origin, now s/p EUS and biopsy on 8/7, and s/p CT guided core needle lung mass biopsy by IR, GI colonoscopy and biopsy, now s/p lap sigmoid colostomy and umbilical hernia repair on 8/14.    Plan:  -Pain control  -ERP protocol  -Abx: ertapenem, vancomycin  -CLD  -IVF    A Surgery  [id number]

## 2020-08-14 NOTE — PROGRESS NOTE ADULT - PROBLEM SELECTOR PLAN 1
Discharge Summary/Instructions after an Endoscopic Procedure  Patient Name: Eliezer Pearl  Patient MRN: [id number]  Patient YOB: 1946 Tuesday, July 16, 2019  Pankaj Monroy MD  RESTRICTIONS:  During your procedure today, you received medications for sedation.  These   medications may affect your judgment, balance and coordination.  Therefore,   for 24 hours, you have the following restrictions:   - DO NOT drive a car, operate machinery, make legal/financial decisions,   sign important papers or drink alcohol.    ACTIVITY:  Today: no heavy lifting, straining or running due to procedural   sedation/anesthesia.  The following day: return to full activity including work.  DIET:  Eat and drink normally unless instructed otherwise.     TREATMENT FOR COMMON SIDE EFFECTS:  - Mild abdominal pain, nausea, belching, bloating or excessive gas:  rest,   eat lightly and use a heating pad.  - Sore Throat: treat with throat lozenges and/or gargle with warm salt   water.  - Because air was used during the procedure, expelling large amounts of air   from your rectum or belching is normal.  - If a bowel prep was taken, you may not have a bowel movement for 1-3 days.    This is normal.  SYMPTOMS TO WATCH FOR AND REPORT TO YOUR PHYSICIAN:  1. Abdominal pain or bloating, other than gas cramps.  2. Chest pain.  3. Back pain.  4. Signs of infection such as: chills or fever occurring within 24 hours   after the procedure.  5. Rectal bleeding, which would show as bright red, maroon, or black stools.   (A tablespoon of blood from the rectum is not serious, especially if   hemorrhoids are present.)  6. Vomiting.  7. Weakness or dizziness.  GO DIRECTLY TO THE NEAREST EMERGENCY ROOM IF YOU HAVE ANY OF THE FOLLOWING:      Difficulty breathing              Chills and/or fever over 101 F   Persistent vomiting and/or vomiting blood   Severe abdominal pain   Severe chest pain   Black, tarry stools   Bleeding- more than one  tablespoon   Any other symptom or condition that you feel may need urgent attention  Your doctor recommends these additional instructions:  If any biopsies were taken, your doctors clinic will contact you in 1 to 2   weeks with any results.  - Patient has a contact number available for emergencies.  The signs and   symptoms of potential delayed complications were discussed with the   patient.  Return to normal activities tomorrow.  Written discharge   instructions were provided to the patient.   - High fiber diet.   - Continue present medications.   - Await pathology results.   - Repeat colonoscopy in 3 years for surveillance.   - Discharge patient to home (ambulatory).   - Return to my office PRN.  For questions, problems or results please call your physician - Pankaj Monroy MD at Work:  [phone].  [facility], EMERGENCY ROOM PHONE NUMBER: [phone]  IF A COMPLICATION OR EMERGENCY SITUATION ARISES AND YOU ARE UNABLE TO REACH   YOUR PHYSICIAN - GO DIRECTLY TO THE EMERGENCY ROOM.  Pankaj Monroy MD  7/16/2019 9:41:31 AM  This report has been verified and signed electronically.  [employer]   Presented with weeks of feculent drainage. 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   - CEA elevated; GI consulted for colonoscopy for malignancy workup, pt amenable to inpatient colo. [place] on 8/12: Impression: Perianal fistula and ulceration found on perianal exam. Likely malignant partially obstructing tumor in the rectum. Biopsied. Stool in the entire examined colon. No specimens collected.  -surg with plan for sigmoid colostomy tomorrow. CLD today, NPO at midnight  -onc following Presented with weeks of feculent drainage. 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 Path shows adenocarcinoma.  - CEA elevated; GI consulted for colonoscopy for malignancy workup, pt amenable to inpatient colo. [place] on 8/12: Impression: Perianal fistula and ulceration found on perianal exam. Likely malignant partially obstructing tumor in the rectum. Biopsied. Stool in the entire examined colon. No specimens collected.  -s/p sigmoid colostomy today w/ surgery.   -onc following

## 2020-08-14 NOTE — PROGRESS NOTE ADULT - PROBLEM SELECTOR PROBLEM 3
Lung nodule, multiple

## 2020-08-14 NOTE — BRIEF OPERATIVE NOTE - NSICDXBRIEFPREOP_GEN_ALL_CORE_FT
PRE-OP DIAGNOSIS:  Fistula, anal 07-Aug-2020 20:43:48  Jose Daniel Camacho
PRE-OP DIAGNOSIS:  Rectal mass 14-Aug-2020 12:19:52  Pa Emmanuel

## 2020-08-15 LAB
ANION GAP SERPL CALC-SCNC: 9 MMO/L — SIGNIFICANT CHANGE UP (ref 7–14)
BUN SERPL-MCNC: 4 MG/DL — LOW (ref 7–23)
CALCIUM SERPL-MCNC: 8.6 MG/DL — SIGNIFICANT CHANGE UP (ref 8.4–10.5)
CHLORIDE SERPL-SCNC: 101 MMOL/L — SIGNIFICANT CHANGE UP (ref 98–107)
CO2 SERPL-SCNC: 28 MMOL/L — SIGNIFICANT CHANGE UP (ref 22–31)
CREAT SERPL-MCNC: 0.79 MG/DL — SIGNIFICANT CHANGE UP (ref 0.5–1.3)
GLUCOSE SERPL-MCNC: 86 MG/DL — SIGNIFICANT CHANGE UP (ref 70–99)
HCT VFR BLD CALC: 43.2 % — SIGNIFICANT CHANGE UP (ref 39–50)
HGB BLD-MCNC: 13.4 G/DL — SIGNIFICANT CHANGE UP (ref 13–17)
MAGNESIUM SERPL-MCNC: 1.9 MG/DL — SIGNIFICANT CHANGE UP (ref 1.6–2.6)
MCHC RBC-ENTMCNC: 30.9 PG — SIGNIFICANT CHANGE UP (ref 27–34)
MCHC RBC-ENTMCNC: 31 % — LOW (ref 32–36)
MCV RBC AUTO: 99.8 FL — SIGNIFICANT CHANGE UP (ref 80–100)
NRBC # FLD: 0 K/UL — SIGNIFICANT CHANGE UP (ref 0–0)
PHOSPHATE SERPL-MCNC: 3.7 MG/DL — SIGNIFICANT CHANGE UP (ref 2.5–4.5)
PLATELET # BLD AUTO: 220 K/UL — SIGNIFICANT CHANGE UP (ref 150–400)
PMV BLD: 9.7 FL — SIGNIFICANT CHANGE UP (ref 7–13)
POTASSIUM SERPL-MCNC: 4 MMOL/L — SIGNIFICANT CHANGE UP (ref 3.5–5.3)
POTASSIUM SERPL-SCNC: 4 MMOL/L — SIGNIFICANT CHANGE UP (ref 3.5–5.3)
RBC # BLD: 4.33 M/UL — SIGNIFICANT CHANGE UP (ref 4.2–5.8)
RBC # FLD: 14 % — SIGNIFICANT CHANGE UP (ref 10.3–14.5)
SODIUM SERPL-SCNC: 138 MMOL/L — SIGNIFICANT CHANGE UP (ref 135–145)
WBC # BLD: 6.71 K/UL — SIGNIFICANT CHANGE UP (ref 3.8–10.5)
WBC # FLD AUTO: 6.71 K/UL — SIGNIFICANT CHANGE UP (ref 3.8–10.5)

## 2020-08-15 RX ORDER — MAGNESIUM SULFATE 500 MG/ML
2 VIAL (ML) INJECTION ONCE
Refills: 0 | Status: COMPLETED | OUTPATIENT
Start: 2020-08-15 | End: 2020-08-15

## 2020-08-15 RX ORDER — ENOXAPARIN SODIUM 100 MG/ML
40 INJECTION SUBCUTANEOUS DAILY
Refills: 0 | Status: DISCONTINUED | OUTPATIENT
Start: 2020-08-15 | End: 2020-08-19

## 2020-08-15 RX ORDER — ACETAMINOPHEN 500 MG
975 TABLET ORAL EVERY 6 HOURS
Refills: 0 | Status: DISCONTINUED | OUTPATIENT
Start: 2020-08-15 | End: 2020-08-19

## 2020-08-15 RX ADMIN — Medication 250 MILLIGRAM(S): at 21:19

## 2020-08-15 RX ADMIN — Medication 15 MILLIGRAM(S): at 16:19

## 2020-08-15 RX ADMIN — OXYCODONE HYDROCHLORIDE 5 MILLIGRAM(S): 5 TABLET ORAL at 21:58

## 2020-08-15 RX ADMIN — OXYCODONE HYDROCHLORIDE 5 MILLIGRAM(S): 5 TABLET ORAL at 21:28

## 2020-08-15 RX ADMIN — ZINC OXIDE 1 APPLICATION(S): 200 OINTMENT TOPICAL at 14:03

## 2020-08-15 RX ADMIN — OXYCODONE HYDROCHLORIDE 5 MILLIGRAM(S): 5 TABLET ORAL at 05:46

## 2020-08-15 RX ADMIN — Medication 975 MILLIGRAM(S): at 23:23

## 2020-08-15 RX ADMIN — HEPARIN SODIUM 5000 UNIT(S): 5000 INJECTION INTRAVENOUS; SUBCUTANEOUS at 05:46

## 2020-08-15 RX ADMIN — Medication 15 MILLIGRAM(S): at 08:44

## 2020-08-15 RX ADMIN — ERTAPENEM SODIUM 120 MILLIGRAM(S): 1 INJECTION, POWDER, LYOPHILIZED, FOR SOLUTION INTRAMUSCULAR; INTRAVENOUS at 21:19

## 2020-08-15 RX ADMIN — OXYCODONE HYDROCHLORIDE 5 MILLIGRAM(S): 5 TABLET ORAL at 13:45

## 2020-08-15 RX ADMIN — Medication 400 MILLIGRAM(S): at 11:52

## 2020-08-15 RX ADMIN — Medication 15 MILLIGRAM(S): at 08:59

## 2020-08-15 RX ADMIN — Medication 15 MILLIGRAM(S): at 02:10

## 2020-08-15 RX ADMIN — Medication 50 GRAM(S): at 11:53

## 2020-08-15 RX ADMIN — Medication 15 MILLIGRAM(S): at 16:04

## 2020-08-15 RX ADMIN — Medication 1000 MILLIGRAM(S): at 12:22

## 2020-08-15 RX ADMIN — Medication 1000 MILLIGRAM(S): at 00:37

## 2020-08-15 RX ADMIN — Medication 15 MILLIGRAM(S): at 02:40

## 2020-08-15 RX ADMIN — Medication 400 MILLIGRAM(S): at 00:07

## 2020-08-15 RX ADMIN — OXYCODONE HYDROCHLORIDE 5 MILLIGRAM(S): 5 TABLET ORAL at 14:15

## 2020-08-15 RX ADMIN — ZINC OXIDE 1 APPLICATION(S): 200 OINTMENT TOPICAL at 05:46

## 2020-08-15 RX ADMIN — SODIUM CHLORIDE 40 MILLILITER(S): 9 INJECTION, SOLUTION INTRAVENOUS at 11:54

## 2020-08-15 RX ADMIN — Medication 1000 MILLIGRAM(S): at 06:17

## 2020-08-15 RX ADMIN — OXYCODONE HYDROCHLORIDE 5 MILLIGRAM(S): 5 TABLET ORAL at 06:16

## 2020-08-15 RX ADMIN — Medication 975 MILLIGRAM(S): at 22:53

## 2020-08-15 RX ADMIN — Medication 400 MILLIGRAM(S): at 05:47

## 2020-08-15 RX ADMIN — ZINC OXIDE 1 APPLICATION(S): 200 OINTMENT TOPICAL at 21:20

## 2020-08-15 RX ADMIN — Medication 250 MILLIGRAM(S): at 10:00

## 2020-08-15 NOTE — PROGRESS NOTE ADULT - ASSESSMENT
75 Year-Old Gentleman with complex perianal fistula, with additional concern for metastatic cancer of unknown primary origin, now s/p EUS and biopsy on 8/7, and s/p CT guided core needle lung mass biopsy by IR, GI colonoscopy and biopsy, now s/p lap sigmoid colostomy and umbilical hernia repair on 8/14.    PLAN:  - Pain control  - Leos to be discontinued  - Continue CLD   - Abx: Ertapenem, Vanco   - DVT ppx    A Surgery  [id number]

## 2020-08-15 NOTE — PROGRESS NOTE ADULT - SUBJECTIVE AND OBJECTIVE BOX
GENERAL SURGERY DAILY PROGRESS NOTE:       Subjective: Patient examined at bedside. No acute events overnight. Pain is controlled w/ medications.  Denies nausea/vomiting.  Ostomy w/bowel sweat and minimal gas.   Denies fever/chills, CP/SOB.      Objective:    Vital Signs Last 24 Hrs  T(C): 36.3 (15 Aug 2020 05:49), Max: 37 (14 Aug 2020 18:28)  T(F): 97.4 (15 Aug 2020 05:49), Max: 98.6 (14 Aug 2020 18:28)  HR: 60 (15 Aug 2020 05:49) (60 - 89)  BP: 104/62 (15 Aug 2020 05:49) (103/71 - 141/78)  BP(mean): 72 (14 Aug 2020 14:00) (70 - 94)  RR: 18 (15 Aug 2020 05:49) (12 - 20)  SpO2: 97% (15 Aug 2020 05:49) (94% - 100%)    I&O's Detail    14 Aug 2020 07:01  -  15 Aug 2020 07:00  --------------------------------------------------------  IN:    IV PiggyBack: 400 mL    lactated ringers.: 560 mL    Oral Fluid: 30 mL  Total IN: 990 mL    OUT:    Ileostomy: 25 mL    Indwelling Catheter - Urethral: 1195 mL  Total OUT: 1220 mL    Total NET: -230 mL          Physical Exam:    General: NAD, laying comfortably in bed  Resp: Breathing comfortably on RA, no increased work of breathing  Abd: soft, non-distended, appropriately tender to palpations, incisions c/d/i, ostomy pink and viable  Ext: warm and well perfused, grossly symmetric       Laboratory Results:                          13.4   6.71  )-----------( 220      ( 15 Aug 2020 06:30 )             43.2     08-15    138  |  101  |  4<L>  ----------------------------<  86  4.0   |  28  |  0.79    Ca    8.6      15 Aug 2020 06:30  Phos  3.7     08-15  Mg     1.9     08-15      PT/INR - ( 14 Aug 2020 07:00 )   PT: 15.3 SEC;   INR: 1.36          PTT - ( 14 Aug 2020 07:00 )  PTT:28.7 SEC      Radiology and Additional Tests:    Medications:    MEDICATIONS  (STANDING):  acetaminophen  IVPB .. 1000 milliGRAM(s) IV Intermittent every 6 hours  ertapenem  IVPB 1000 milliGRAM(s) IV Intermittent every 24 hours  heparin   Injectable 5000 Unit(s) SubCutaneous every 8 hours  ketorolac   Injectable 15 milliGRAM(s) IV Push every 6 hours  lactated ringers. 1000 milliLiter(s) (40 mL/Hr) IV Continuous <Continuous>  lidocaine   Patch 1 Patch Transdermal daily  loratadine 10 milliGRAM(s) Oral daily  magnesium sulfate  IVPB 2 Gram(s) IV Intermittent once  vancomycin  IVPB 1000 milliGRAM(s) IV Intermittent every 12 hours  zinc oxide 20% Ointment 1 Application(s) Topical every 8 hours    MEDICATIONS  (PRN):  HYDROmorphone  Injectable 0.5 milliGRAM(s) IV Push every 4 hours PRN Severe Pain (7 - 10)  ondansetron Injectable 4 milliGRAM(s) IV Push once PRN Nausea and/or Vomiting  oxyCODONE    IR 5 milliGRAM(s) Oral every 6 hours PRN Moderate Pain (4 - 6)

## 2020-08-15 NOTE — PROGRESS NOTE ADULT - SUBJECTIVE AND OBJECTIVE BOX
Sai Garcia 74 yo M, MRN [id number].  Resting comfortably in bed, no apparent anesthesia related complications.

## 2020-08-16 LAB
ANION GAP SERPL CALC-SCNC: 12 MMO/L — SIGNIFICANT CHANGE UP (ref 7–14)
BUN SERPL-MCNC: 4 MG/DL — LOW (ref 7–23)
CALCIUM SERPL-MCNC: 8.9 MG/DL — SIGNIFICANT CHANGE UP (ref 8.4–10.5)
CHLORIDE SERPL-SCNC: 100 MMOL/L — SIGNIFICANT CHANGE UP (ref 98–107)
CO2 SERPL-SCNC: 27 MMOL/L — SIGNIFICANT CHANGE UP (ref 22–31)
CREAT SERPL-MCNC: 0.8 MG/DL — SIGNIFICANT CHANGE UP (ref 0.5–1.3)
GLUCOSE SERPL-MCNC: 77 MG/DL — SIGNIFICANT CHANGE UP (ref 70–99)
HCT VFR BLD CALC: 41.4 % — SIGNIFICANT CHANGE UP (ref 39–50)
HGB BLD-MCNC: 13.6 G/DL — SIGNIFICANT CHANGE UP (ref 13–17)
MAGNESIUM SERPL-MCNC: 2.1 MG/DL — SIGNIFICANT CHANGE UP (ref 1.6–2.6)
MCHC RBC-ENTMCNC: 30.8 PG — SIGNIFICANT CHANGE UP (ref 27–34)
MCHC RBC-ENTMCNC: 32.9 % — SIGNIFICANT CHANGE UP (ref 32–36)
MCV RBC AUTO: 93.9 FL — SIGNIFICANT CHANGE UP (ref 80–100)
NRBC # FLD: 0 K/UL — SIGNIFICANT CHANGE UP (ref 0–0)
PHOSPHATE SERPL-MCNC: 3.1 MG/DL — SIGNIFICANT CHANGE UP (ref 2.5–4.5)
PLATELET # BLD AUTO: 228 K/UL — SIGNIFICANT CHANGE UP (ref 150–400)
PMV BLD: 9.6 FL — SIGNIFICANT CHANGE UP (ref 7–13)
POTASSIUM SERPL-MCNC: 4.1 MMOL/L — SIGNIFICANT CHANGE UP (ref 3.5–5.3)
POTASSIUM SERPL-SCNC: 4.1 MMOL/L — SIGNIFICANT CHANGE UP (ref 3.5–5.3)
RBC # BLD: 4.41 M/UL — SIGNIFICANT CHANGE UP (ref 4.2–5.8)
RBC # FLD: 13.5 % — SIGNIFICANT CHANGE UP (ref 10.3–14.5)
SODIUM SERPL-SCNC: 139 MMOL/L — SIGNIFICANT CHANGE UP (ref 135–145)
VANCOMYCIN TROUGH SERPL-MCNC: 12.8 UG/ML — SIGNIFICANT CHANGE UP (ref 10–20)
WBC # BLD: 7.02 K/UL — SIGNIFICANT CHANGE UP (ref 3.8–10.5)
WBC # FLD AUTO: 7.02 K/UL — SIGNIFICANT CHANGE UP (ref 3.8–10.5)

## 2020-08-16 RX ADMIN — Medication 975 MILLIGRAM(S): at 05:18

## 2020-08-16 RX ADMIN — ZINC OXIDE 1 APPLICATION(S): 200 OINTMENT TOPICAL at 21:45

## 2020-08-16 RX ADMIN — Medication 250 MILLIGRAM(S): at 12:38

## 2020-08-16 RX ADMIN — OXYCODONE HYDROCHLORIDE 5 MILLIGRAM(S): 5 TABLET ORAL at 18:28

## 2020-08-16 RX ADMIN — Medication 975 MILLIGRAM(S): at 12:42

## 2020-08-16 RX ADMIN — Medication 975 MILLIGRAM(S): at 18:18

## 2020-08-16 RX ADMIN — ERTAPENEM SODIUM 120 MILLIGRAM(S): 1 INJECTION, POWDER, LYOPHILIZED, FOR SOLUTION INTRAMUSCULAR; INTRAVENOUS at 21:44

## 2020-08-16 RX ADMIN — Medication 975 MILLIGRAM(S): at 17:17

## 2020-08-16 RX ADMIN — Medication 975 MILLIGRAM(S): at 12:12

## 2020-08-16 RX ADMIN — ENOXAPARIN SODIUM 40 MILLIGRAM(S): 100 INJECTION SUBCUTANEOUS at 12:12

## 2020-08-16 RX ADMIN — ZINC OXIDE 1 APPLICATION(S): 200 OINTMENT TOPICAL at 14:05

## 2020-08-16 RX ADMIN — OXYCODONE HYDROCHLORIDE 5 MILLIGRAM(S): 5 TABLET ORAL at 19:19

## 2020-08-16 RX ADMIN — OXYCODONE HYDROCHLORIDE 5 MILLIGRAM(S): 5 TABLET ORAL at 12:10

## 2020-08-16 RX ADMIN — OXYCODONE HYDROCHLORIDE 5 MILLIGRAM(S): 5 TABLET ORAL at 12:40

## 2020-08-16 NOTE — CHART NOTE - NSCHARTNOTEFT_GEN_A_CORE
Consult: Infectious Disease    Patient management of osteomyelitis with continued Vancomycin and Ertapenem via PICC line.

## 2020-08-16 NOTE — PROGRESS NOTE ADULT - SUBJECTIVE AND OBJECTIVE BOX
GENERAL SURGERY PROGRESS NOTE TEAM A  ----------------------------------------------------------------------------------    ZARINA GLOVER  |  [id number]   |   75y  |    Male   |   SRIN[address]A [address]S [address]   |    Admit:  08-04-20 (12d)    Attending: Aziza Valdez      Patient is a 75y old  Male who presents with a chief complaint of Perianal discharge and low back pain x4 weeks (15 Aug 2020 16:23)      ----------------------------------------------------------------------------------    SUBJECTIVE:   Patient seen today during morning rounds at bedside and without acute distress. Reports mild pain being tolerated with use of pain management.     Overnight: Mild pain overnight, passed trial of void, tolerated CLD, fluids discontinued.    OBJECTIVE:  MEDICATIONS  (STANDING):  acetaminophen   Tablet .. 975 milliGRAM(s) Oral every 6 hours  enoxaparin Injectable 40 milliGRAM(s) SubCutaneous daily  ertapenem  IVPB 1000 milliGRAM(s) IV Intermittent every 24 hours  lidocaine   Patch 1 Patch Transdermal daily  loratadine 10 milliGRAM(s) Oral daily  vancomycin  IVPB 1000 milliGRAM(s) IV Intermittent every 12 hours  zinc oxide 20% Ointment 1 Application(s) Topical every 8 hours    MEDICATIONS  (PRN):  HYDROmorphone  Injectable 0.5 milliGRAM(s) IV Push every 4 hours PRN Severe Pain (7 - 10)  ondansetron Injectable 4 milliGRAM(s) IV Push once PRN Nausea and/or Vomiting  oxyCODONE    IR 5 milliGRAM(s) Oral every 6 hours PRN Moderate Pain (4 - 6)      Allergies    dust (Rhinitis; Headache)  No Known Drug Allergies    Intolerances        PMH:   H/O: Obesity  Basal Cell Cancer; Right side distal nose  Seasonal Allergies  H/O skin graft  Basal Cell Carcinoma of Anterior Chest; s/p MOHS surgery 8/10 proximal to suprasternal notch      Vitals:  Vital Signs Last 24 Hrs  T(C): 36.7 (16 Aug 2020 05:19), Max: 36.8 (15 Aug 2020 09:52)  T(F): 98.1 (16 Aug 2020 05:19), Max: 98.3 (15 Aug 2020 09:52)  HR: 67 (16 Aug 2020 05:19) (60 - 69)  BP: 123/73 (16 Aug 2020 05:19) (101/66 - 123/73)  BP(mean): --  RR: 18 (16 Aug 2020 05:19) (18 - 19)  SpO2: 98% (16 Aug 2020 05:19) (96% - 98%)      Physical Exam:  Constitutional: resting in bed with no acute distress  Neuro: AAOx3  Respiratory:  unlabored breathing  Gastrointestinal: Abdomen soft, non distended, tender to palpation, ostomy bag with bowel sweat without gas, ostomy pink, secure and surgical wounds clean dry and intact.   Extremities:  No edema, no calf tenderness  Skin: Non-jaundiced, no cyanosis or rash observed    Intake&Output:    08-15-20 @ 07:01  -  08-16-20 @ 07:00  --------------------------------------------------------  IN: 1480 mL / OUT: 1235 mL / NET: 245 mL        LABS:  ----------  LABORATORY DATA:  ----------                        13.6   7.02  )-----------( 228      ( 16 Aug 2020 06:30 )             41.4               08-16    139  |  100  |  4<L>  ----------------------------<  77  4.1   |  27  |  0.80    Ca    8.9      16 Aug 2020 06:30  Phos  3.1     08-16  Mg     2.1     08-16 GENERAL SURGERY PROGRESS NOTE TEAM A  ----------------------------------------------------------------------------------    ZARINA GLOVER  |  [id number]   |   75y  |    Male   |   SR[address]IV[address]A 8S [address]   |    Admit:  08-04-20 (12d)    Attending: Aziza Valdez      Patient is a 75y old  Male who presents with a chief complaint of Perianal discharge and low back pain x4 weeks (15 Aug 2020 16:23)      ----------------------------------------------------------------------------------    SUBJECTIVE:   Patient seen today during morning rounds at bedside and without acute distress. Reports mild pain being tolerated.     Overnight: Mild pain overnight, passed trial of void, tolerated CLD, fluids discontinued.    OBJECTIVE:  MEDICATIONS  (STANDING):  acetaminophen   Tablet .. 975 milliGRAM(s) Oral every 6 hours  enoxaparin Injectable 40 milliGRAM(s) SubCutaneous daily  ertapenem  IVPB 1000 milliGRAM(s) IV Intermittent every 24 hours  lidocaine   Patch 1 Patch Transdermal daily  loratadine 10 milliGRAM(s) Oral daily  vancomycin  IVPB 1000 milliGRAM(s) IV Intermittent every 12 hours  zinc oxide 20% Ointment 1 Application(s) Topical every 8 hours    MEDICATIONS  (PRN):  HYDROmorphone  Injectable 0.5 milliGRAM(s) IV Push every 4 hours PRN Severe Pain (7 - 10)  ondansetron Injectable 4 milliGRAM(s) IV Push once PRN Nausea and/or Vomiting  oxyCODONE    IR 5 milliGRAM(s) Oral every 6 hours PRN Moderate Pain (4 - 6)      Allergies    dust (Rhinitis; Headache)  No Known Drug Allergies    Intolerances        PMH:   H/O: Obesity  Basal Cell Cancer; Right side distal nose  Seasonal Allergies  H/O skin graft  Basal Cell Carcinoma of Anterior Chest; s/p MOHS surgery 8/10 proximal to suprasternal notch      Vitals:  Vital Signs Last 24 Hrs  T(C): 36.7 (16 Aug 2020 05:19), Max: 36.8 (15 Aug 2020 09:52)  T(F): 98.1 (16 Aug 2020 05:19), Max: 98.3 (15 Aug 2020 09:52)  HR: 67 (16 Aug 2020 05:19) (60 - 69)  BP: 123/73 (16 Aug 2020 05:19) (101/66 - 123/73)  BP(mean): --  RR: 18 (16 Aug 2020 05:19) (18 - 19)  SpO2: 98% (16 Aug 2020 05:19) (96% - 98%)      Physical Exam:  Constitutional: resting in bed with no acute distress  Neuro: AAOx3  Respiratory:  unlabored breathing  Gastrointestinal: Abdomen soft, non distended, tender to palpation, ostomy bag with bowel sweat without gas, ostomy pink, secure and surgical wounds clean dry and intact.   Extremities:  No edema, no calf tenderness  Skin: Non-jaundiced, no cyanosis or rash observed    Intake&Output:    08-15-20 @ 07:01  -  08-16-20 @ 07:00  --------------------------------------------------------  IN: 1480 mL / OUT: 1235 mL / NET: 245 mL        LABS:  ----------  LABORATORY DATA:  ----------                        13.6   7.02  )-----------( 228      ( 16 Aug 2020 06:30 )             41.4               08-16    139  |  100  |  4<L>  ----------------------------<  77  4.1   |  27  |  0.80    Ca    8.9      16 Aug 2020 06:30  Phos  3.1     08-16  Mg     2.1     08-16

## 2020-08-16 NOTE — PHYSICAL THERAPY INITIAL EVALUATION ADULT - PERTINENT HX OF CURRENT PROBLEM, REHAB EVAL
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 PMH listed below

## 2020-08-16 NOTE — PROGRESS NOTE ADULT - ASSESSMENT
75 Year-Old Gentleman with complex perianal fistula, with additional concern for metastatic cancer of unknown primary origin, now s/p EUS and biopsy on 8/7, and s/p CT guided core needle lung mass biopsy by IR, GI colonoscopy and biopsy, now s/p lap sigmoid colostomy and umbilical hernia repair on 8/14.     PLAN:  - Pain control  - Start LRD; assess tolerance    - Abx: Ertapenem, Vanco   - DVT ppx    A [facility]  [id number] 75 Year-Old Gentleman with complex perianal fistula, with additional concern for metastatic cancer of unknown primary origin, now s/p EUS and biopsy on 8/7, and s/p CT guided core needle lung mass biopsy by IR, GI colonoscopy and biopsy, now s/p lap sigmoid colostomy and umbilical hernia repair on 8/14.     PLAN:  - Pain control  - Start LRD; assess tolerance    - Abx: Ertapenem, Vanco   - DVT ppx  - ID consult for antibiotic coverage  - PT recommendations f/u    [facility]  [id number]

## 2020-08-17 ENCOUNTER — TRANSCRIPTION ENCOUNTER (OUTPATIENT)
Age: 75
End: 2020-08-17

## 2020-08-17 LAB
ANION GAP SERPL CALC-SCNC: 10 MMO/L — SIGNIFICANT CHANGE UP (ref 7–14)
BUN SERPL-MCNC: 5 MG/DL — LOW (ref 7–23)
CALCIUM SERPL-MCNC: 9.1 MG/DL — SIGNIFICANT CHANGE UP (ref 8.4–10.5)
CHLORIDE SERPL-SCNC: 100 MMOL/L — SIGNIFICANT CHANGE UP (ref 98–107)
CO2 SERPL-SCNC: 29 MMOL/L — SIGNIFICANT CHANGE UP (ref 22–31)
CREAT SERPL-MCNC: 0.72 MG/DL — SIGNIFICANT CHANGE UP (ref 0.5–1.3)
GLUCOSE SERPL-MCNC: 94 MG/DL — SIGNIFICANT CHANGE UP (ref 70–99)
HCT VFR BLD CALC: 44.8 % — SIGNIFICANT CHANGE UP (ref 39–50)
HGB BLD-MCNC: 14.8 G/DL — SIGNIFICANT CHANGE UP (ref 13–17)
MAGNESIUM SERPL-MCNC: 1.9 MG/DL — SIGNIFICANT CHANGE UP (ref 1.6–2.6)
MCHC RBC-ENTMCNC: 31.8 PG — SIGNIFICANT CHANGE UP (ref 27–34)
MCHC RBC-ENTMCNC: 33 % — SIGNIFICANT CHANGE UP (ref 32–36)
MCV RBC AUTO: 96.3 FL — SIGNIFICANT CHANGE UP (ref 80–100)
NRBC # FLD: 0 K/UL — SIGNIFICANT CHANGE UP (ref 0–0)
PHOSPHATE SERPL-MCNC: 3.1 MG/DL — SIGNIFICANT CHANGE UP (ref 2.5–4.5)
PLATELET # BLD AUTO: 243 K/UL — SIGNIFICANT CHANGE UP (ref 150–400)
PMV BLD: 10.2 FL — SIGNIFICANT CHANGE UP (ref 7–13)
POTASSIUM SERPL-MCNC: 3.5 MMOL/L — SIGNIFICANT CHANGE UP (ref 3.5–5.3)
POTASSIUM SERPL-SCNC: 3.5 MMOL/L — SIGNIFICANT CHANGE UP (ref 3.5–5.3)
RBC # BLD: 4.65 M/UL — SIGNIFICANT CHANGE UP (ref 4.2–5.8)
RBC # FLD: 13.8 % — SIGNIFICANT CHANGE UP (ref 10.3–14.5)
SODIUM SERPL-SCNC: 139 MMOL/L — SIGNIFICANT CHANGE UP (ref 135–145)
WBC # BLD: 5.89 K/UL — SIGNIFICANT CHANGE UP (ref 3.8–10.5)
WBC # FLD AUTO: 5.89 K/UL — SIGNIFICANT CHANGE UP (ref 3.8–10.5)

## 2020-08-17 PROCEDURE — 36573 INSJ PICC RS&I 5 YR+: CPT

## 2020-08-17 PROCEDURE — 72196 MRI PELVIS W/DYE: CPT | Mod: 26

## 2020-08-17 RX ORDER — ACETAMINOPHEN 500 MG
3 TABLET ORAL
Qty: 0 | Refills: 0 | DISCHARGE
Start: 2020-08-17

## 2020-08-17 RX ORDER — SODIUM CHLORIDE 9 MG/ML
10 INJECTION INTRAMUSCULAR; INTRAVENOUS; SUBCUTANEOUS
Qty: 1050 | Refills: 0
Start: 2020-08-17 | End: 2020-09-20

## 2020-08-17 RX ORDER — ZINC OXIDE 200 MG/G
1 OINTMENT TOPICAL
Qty: 0 | Refills: 0 | DISCHARGE
Start: 2020-08-17

## 2020-08-17 RX ORDER — ERTAPENEM SODIUM 1 G/1
1 INJECTION, POWDER, LYOPHILIZED, FOR SOLUTION INTRAMUSCULAR; INTRAVENOUS
Qty: 35 | Refills: 0
Start: 2020-08-17 | End: 2020-09-20

## 2020-08-17 RX ORDER — OXYCODONE HYDROCHLORIDE 5 MG/1
1 TABLET ORAL
Qty: 20 | Refills: 0
Start: 2020-08-17

## 2020-08-17 RX ORDER — CHLORHEXIDINE GLUCONATE 213 G/1000ML
1 SOLUTION TOPICAL
Refills: 0 | Status: DISCONTINUED | OUTPATIENT
Start: 2020-08-17 | End: 2020-08-19

## 2020-08-17 RX ORDER — SODIUM CHLORIDE 9 MG/ML
10 INJECTION INTRAMUSCULAR; INTRAVENOUS; SUBCUTANEOUS
Refills: 0 | Status: DISCONTINUED | OUTPATIENT
Start: 2020-08-17 | End: 2020-08-19

## 2020-08-17 RX ORDER — POTASSIUM CHLORIDE 20 MEQ
40 PACKET (EA) ORAL ONCE
Refills: 0 | Status: COMPLETED | OUTPATIENT
Start: 2020-08-17 | End: 2020-08-17

## 2020-08-17 RX ORDER — VANCOMYCIN HCL 1 G
1 VIAL (EA) INTRAVENOUS
Qty: 70 | Refills: 0
Start: 2020-08-17 | End: 2020-09-20

## 2020-08-17 RX ORDER — LIDOCAINE 4 G/100G
1 CREAM TOPICAL
Qty: 5 | Refills: 0
Start: 2020-08-17 | End: 2020-08-21

## 2020-08-17 RX ORDER — SODIUM CHLORIDE 9 MG/ML
10 INJECTION INTRAMUSCULAR; INTRAVENOUS; SUBCUTANEOUS
Qty: 0 | Refills: 0 | DISCHARGE
Start: 2020-08-17

## 2020-08-17 RX ADMIN — OXYCODONE HYDROCHLORIDE 5 MILLIGRAM(S): 5 TABLET ORAL at 01:02

## 2020-08-17 RX ADMIN — OXYCODONE HYDROCHLORIDE 5 MILLIGRAM(S): 5 TABLET ORAL at 00:32

## 2020-08-17 RX ADMIN — Medication 40 MILLIEQUIVALENT(S): at 09:47

## 2020-08-17 RX ADMIN — Medication 250 MILLIGRAM(S): at 00:00

## 2020-08-17 RX ADMIN — Medication 975 MILLIGRAM(S): at 19:27

## 2020-08-17 RX ADMIN — OXYCODONE HYDROCHLORIDE 5 MILLIGRAM(S): 5 TABLET ORAL at 07:16

## 2020-08-17 RX ADMIN — OXYCODONE HYDROCHLORIDE 5 MILLIGRAM(S): 5 TABLET ORAL at 19:27

## 2020-08-17 RX ADMIN — Medication 975 MILLIGRAM(S): at 06:46

## 2020-08-17 RX ADMIN — ENOXAPARIN SODIUM 40 MILLIGRAM(S): 100 INJECTION SUBCUTANEOUS at 13:41

## 2020-08-17 RX ADMIN — OXYCODONE HYDROCHLORIDE 5 MILLIGRAM(S): 5 TABLET ORAL at 06:46

## 2020-08-17 RX ADMIN — Medication 250 MILLIGRAM(S): at 16:06

## 2020-08-17 RX ADMIN — OXYCODONE HYDROCHLORIDE 5 MILLIGRAM(S): 5 TABLET ORAL at 13:41

## 2020-08-17 RX ADMIN — Medication 975 MILLIGRAM(S): at 01:02

## 2020-08-17 RX ADMIN — ZINC OXIDE 1 APPLICATION(S): 200 OINTMENT TOPICAL at 21:27

## 2020-08-17 RX ADMIN — Medication 975 MILLIGRAM(S): at 13:40

## 2020-08-17 RX ADMIN — OXYCODONE HYDROCHLORIDE 5 MILLIGRAM(S): 5 TABLET ORAL at 14:11

## 2020-08-17 RX ADMIN — Medication 975 MILLIGRAM(S): at 00:32

## 2020-08-17 RX ADMIN — ZINC OXIDE 1 APPLICATION(S): 200 OINTMENT TOPICAL at 15:59

## 2020-08-17 RX ADMIN — ERTAPENEM SODIUM 120 MILLIGRAM(S): 1 INJECTION, POWDER, LYOPHILIZED, FOR SOLUTION INTRAMUSCULAR; INTRAVENOUS at 21:27

## 2020-08-17 RX ADMIN — Medication 975 MILLIGRAM(S): at 07:16

## 2020-08-17 RX ADMIN — Medication 975 MILLIGRAM(S): at 14:10

## 2020-08-17 RX ADMIN — ZINC OXIDE 1 APPLICATION(S): 200 OINTMENT TOPICAL at 06:46

## 2020-08-17 RX ADMIN — OXYCODONE HYDROCHLORIDE 5 MILLIGRAM(S): 5 TABLET ORAL at 19:57

## 2020-08-17 NOTE — ADVANCED PRACTICE NURSE CONSULT - REASON FOR CONSULT
Initial visit for post op teaching.   Patient received sitting upright in bed. Wife at the bedside. Patient and wife willing to participate in stoma care/pouch change. Terms reviewed colostomy/ostomy. Patient verbalized understanding. Educational material provided. Patient asking appropriate questions. Proceeded it to change pouch. Patient able to demonstrate pouch emptying independently. Patient able ot actively participate in pouch change, measuring stoma, cleansing peristomal skin and placing new pouch. Wife assisted. Patient able to report concerning signs to report to MD. Template left at the bedside.

## 2020-08-17 NOTE — CHART NOTE - NSCHARTNOTEFT_GEN_A_CORE
Multiple attempts to see patient today, was not available in room. Reviewed chart.    Note that patient s/p OR on 8/14  Path from pulmonary nodule with adenoca (met?)  FNA path from spine neg for malignancy  Culture from spine negative up to this point in time    Overall:  1) L1 Spinal lesion/OM/abscess  - Possible OM, culture negative  - Vanco 1g q 12 (monitor levels)--trough 11 is adequate, continue  - Ertapenem 1g q 24  - Plan to continue Vanco/Erta as above through 9/21/20 (would check CBC, BUN, Cr, LFTs, Vanco level weekly, fax to [phone])  - Okay for PICC as long as no signs sepsis  - Neurochecks, neurosurgery eval if any signs focal deficit  2) Fistula  - S/P OR  - F/U surgery  3) Malignancy/Pulmonary nodule  - Evidence for malignancy, further care per primary team    Champ Segovia MD  Pager [phone]  After 5pm and on weekends call [phone]

## 2020-08-17 NOTE — DISCHARGE NOTE PROVIDER - PROVIDER TOKENS
PROVIDER:[TOKEN:[[id number]],FOLLOWUP:[1 week]] PROVIDER:[TOKEN:[[id number]],FOLLOWUP:[1 week]]

## 2020-08-17 NOTE — CHART NOTE - NSCHARTNOTEFT_GEN_A_CORE
Pre-Interventional Radiology Procedure Note    Name: ZARINA GLOVER  MRN:[id number]  Age: 75y  Gender: Male    Procedure: PICC Line placement    Diagnosis/Indication: Osteomyelitis IV antibiotic treatment    Interventional Radiology Attending Physician: Dr. Freeman    Ordering Attending Physician: Dr. Valdez    PMH:  H/O: Obesity  Basal Cell Cancer; Right side distal nose  Seasonal Allergies      Labs:  ----------  LABORATORY DATA:  ----------                        14.8   5.89  )-----------( 243      ( 17 Aug 2020 07:10 )             44.8               08-17    139  |  100  |  5<L>  ----------------------------<  94  3.5   |  29  |  0.72    Ca    9.1      17 Aug 2020 07:10  Phos  3.1     08-17  Mg     1.9     08-17

## 2020-08-17 NOTE — DISCHARGE NOTE PROVIDER - NSDCMRMEDTOKEN_GEN_ALL_CORE_FT
Claritin 10 mg oral tablet: 1 tab(s) orally once a day acetaminophen 325 mg oral tablet: 3 tab(s) orally every 6 hours, As Needed  CBC, Comprehensive Metabolic panel, Vancomycin level weekly: 1  intravenous every 7 days  CBC, Comprehensive metabolic panel, vancomycin level weekly    Fax to [phone]   Claritin 10 mg oral tablet: 1 tab(s) orally once a day  ertapenem 1 g injection: 1 gram(s) injectable every 24 hours MDD:1 GRAM  End date 9/21/20  lidocaine 5% topical film: Apply topically to affected area once a day (on for 12 hour, off for 12 hours) MDD:1 patch  Normal Saline Flush 0.9% injectable solution: 10 milligram(s) injectable 2 times a day    Flush PICC line after each use  oxyCODONE 5 mg oral tablet: 1 tab(s) orally every 6 hours, As needed, Moderate Pain (4 - 6) MDD:4  vancomycin 1 g intravenous injection: 1 gram(s) intravenous every 12 hours MDD:2 grams    End date 9/21/20  zinc oxide 20% topical ointment: 1 application topically every 8 hours acetaminophen 325 mg oral tablet: 3 tab(s) orally every 6 hours, As Needed  CBC, Comprehensive Metabolic panel, Vancomycin level weekly: 1  intravenous every 7 days  CBC, Comprehensive metabolic panel, vancomycin level weekly    Fax to [phone]   Claritin 10 mg oral tablet: 1 tab(s) orally once a day  ertapenem 1 g injection: 1 gram(s) injectable every 24 hours MDD:1 GRAM  End date 9/21/20  lidocaine 5% topical film: Apply topically to affected area once a day (on for 12 hour, off for 12 hours) MDD:1 patch  Normal Saline Flush 0.9% injectable solution: 10 milligram(s) injectable 2 times a day    Flush PICC line after each use  oxyCODONE 5 mg oral tablet: 1 tab(s) orally every 6 hours, As needed, Moderate Pain (4 - 6) MDD:4  vancomycin 1 g intravenous injection: 1 gram(s) intravenous every 12 hours MDD:2 grams    End date 9/21/20  zinc oxide 20% topical ointment: 1 application topically every 8 hours acetaminophen 325 mg oral tablet: 3 tab(s) orally every 6 hours, As Needed  CBC, Comprehensive Metabolic panel, Vancomycin level weekly: 1  intravenous every 7 days  CBC, Comprehensive metabolic panel, vancomycin level weekly    Fax to [phone]   Claritin 10 mg oral tablet: 1 tab(s) orally once a day  ertapenem 1 g injection: 1 gram(s) injectable every 24 hours MDD:1 GRAM  End date 9/21/20  lidocaine 5% topical film: Apply topically to affected area once a day (on for 12 hour, off for 12 hours) MDD:1 patch  Normal Saline Flush 0.9% injectable solution: 10 milligram(s) injectable 2 times a day    Flush PICC line after each use  Normal Saline Flush 0.9% injectable solution: 10 milliliter(s) injectable 3 times a day  Flush PICC line after each use 35 day supply MDD:30ml  oxyCODONE 5 mg oral tablet: 1 tab(s) orally every 6 hours, As needed, Moderate Pain (4 - 6) MDD:4  vancomycin 1 g intravenous injection: 1 gram(s) intravenous every 12 hours MDD:2 grams    End date 9/21/20  zinc oxide 20% topical ointment: 1 application topically every 8 hours acetaminophen 325 mg oral tablet: 3 tab(s) orally every 6 hours, As Needed  CBC, Comprehensive Metabolic panel, Vancomycin level weekly: 1  intravenous every 7 days  CBC, Comprehensive metabolic panel, vancomycin level weekly    Fax to [phone]   Claritin 10 mg oral tablet: 1 tab(s) orally once a day  ertapenem 1 g injection: 1 gram(s) injectable every 24 hours MDD:1 GRAM  End date 9/21/20  lidocaine 5% topical film: Apply topically to affected area once a day (on for 12 hour, off for 12 hours) MDD:1 patch  Normal Saline Flush 0.9% injectable solution: 10 milliliter(s) injectable 3 times a day  Flush PICC line after each use 35 day supply MDD:30ml  oxyCODONE 5 mg oral tablet: 1 tab(s) orally every 6 hours, As needed, Moderate Pain (4 - 6) MDD:4  vancomycin 1 g intravenous injection: 1 gram(s) intravenous every 12 hours MDD:2 grams    End date 9/21/20  zinc oxide 20% topical ointment: 1 application topically every 8 hours acetaminophen 325 mg oral tablet: 3 tab(s) orally every 6 hours, As Needed  CBC, Comprehensive Metabolic panel, Vancomycin level weekly: 1  intravenous every 7 days  CBC, Comprehensive metabolic panel, vancomycin level weekly    Fax to [phone]   Claritin 10 mg oral tablet: 1 tab(s) orally once a day  ertapenem 1 g injection: 1 gram(s) injectable every 24 hours MDD:1 GRAM  End date 9/21/20  lidocaine 5% topical film: Apply topically to affected area once a day (on for 12 hour, off for 12 hours) MDD:1 patch  Normal Saline Flush 0.9% injectable solution: 10 milliliter(s) injectable 3 times a day  Flush PICC line after each use 35 day supply MDD:30ml  oxyCODONE 5 mg oral tablet: 1 tab(s) orally every 6 hours, As needed, Moderate Pain (4 - 6) MDD:4  vancomycin 1 g intravenous injection: 1 gram(s) intravenous every 12 hours MDD:2 grams    End date 9/21/20  zinc oxide 20% topical ointment: 1 application topically every 8 hours acetaminophen 325 mg oral tablet: 3 tab(s) orally every 6 hours, As Needed  CBC, Comprehensive Metabolic panel, Vancomycin level weekly: 1  intravenous every 7 days  CBC, Comprehensive metabolic panel, vancomycin level weekly    Fax to [phone]   Claritin 10 mg oral tablet: 1 tab(s) orally once a day  ertapenem 1 g injection: 1 gram(s) injectable every 24 hours MDD:1 GRAM  End date 9/21/20  lidocaine 5% topical film: Apply topically to affected area once a day (on for 12 hour, off for 12 hours) MDD:1 patch  Normal Saline Flush 0.9% injectable solution: 10 milliliter(s) injectable 3 times a day  Flush PICC line before and after each use 35 day supply MDD:30ml  oxyCODONE 5 mg oral tablet: 1 tab(s) orally every 6 hours, As needed, Moderate Pain (4 - 6) MDD:4  vancomycin 1 g intravenous injection: 1 gram(s) intravenous every 12 hours MDD:2 grams    End date 9/21/20  zinc oxide 20% topical ointment: 1 application topically every 8 hours acetaminophen 325 mg oral tablet: 3 tab(s) orally every 6 hours, As Needed  CBC, Comprehensive Metabolic panel, Vancomycin level weekly: 1  intravenous every 7 days  CBC, Comprehensive metabolic panel, vancomycin level weekly    Fax to [phone]   Claritin 10 mg oral tablet: 1 tab(s) orally once a day  ertapenem 1 g injection: 1 gram(s) injectable every 24 hours MDD:1 GRAM  End date 9/21/20  lidocaine 5% topical film: Apply topically to affected area once a day (on for 12 hour, off for 12 hours) MDD:1 patch  Normal Saline Flush 0.9% injectable solution: 10 milliliter(s) injectable 3 times a day  Flush PICC line before and after each use 35 day supply MDD:30ml  oxyCODONE 5 mg oral tablet: 1 tab(s) orally every 6 hours, As needed, Moderate Pain (4 - 6) MDD:4  vancomycin 1 g intravenous injection: 1 gram(s) intravenous every 12 hours MDD:2 grams    End date 9/21/20  zinc oxide 20% topical ointment: 1 application topically every 8 hours acetaminophen 325 mg oral tablet: 3 tab(s) orally every 6 hours, As Needed  CBC, Comprehensive Metabolic panel, Vancomycin level weekly: 1  intravenous every 7 days  CBC, Comprehensive metabolic panel, vancomycin level weekly    Fax to [phone]   Claritin 10 mg oral tablet: 1 tab(s) orally once a day  ertapenem 1 g injection: 1 gram(s) injectable every 24 hours MDD:1 GRAM  End date 9/21/20  lidocaine 5% topical film: Apply topically to affected area once a day (on for 12 hour, off for 12 hours) MDD:1 patch  Normal Saline Flush 0.9% injectable solution: 10 milliliter(s) injectable 3 times a day  Flush PICC line before and after each use 35 day supply MDD:30ml  oxyCODONE 5 mg oral tablet: 1 tab(s) orally every 6 hours, As needed, Moderate Pain (4 - 6) MDD:4  vancomycin 1 g intravenous injection: 1 gram(s) intravenous every 12 hours MDD:2 grams    End date 9/21/20  zinc oxide 20% topical ointment: 1 application topically every 8 hours

## 2020-08-17 NOTE — DISCHARGE NOTE PROVIDER - NSDCCPTREATMENT_GEN_ALL_CORE_FT
PRINCIPAL PROCEDURE  Procedure: Laparoscopic sigmoid loop colostomy  Findings and Treatment:       SECONDARY PROCEDURE  Procedure: Open repair of umbilical hernia in adult  Findings and Treatment:

## 2020-08-17 NOTE — ADVANCED PRACTICE NURSE CONSULT - ASSESSMENT
Stoma size: 1 5/8"  Appliance used: Two piece drainable pouch      Flat waffer (2 1/4")- item # [id number]     Drainable pouch (2 1/4")- item # [id number]

## 2020-08-17 NOTE — CHART NOTE - NSCHARTNOTEFT_GEN_A_CORE
Source: Patient [X ]    Family [ ]     other [X] electronic chart, RN     Diet : Diet, Low Fiber (08-16-20 @ 08:28)    Patient seen for nutrition follow-up, severe malnutrition. Patient continues with good appetite and PO intake at this time. States wife is also bringing food for patient. Denies any nausea/vomiting/diarrhea/constipation or difficulty chewing and swallowing. Reviewed Low fiber diet recommendations. Offered written materials, which he refused. Patient stated that he understands the diet. No nutritional questions or concerns voiced at this time. RD remains available, patient made aware.     Current Weight: Weight (kg): 77.1Kg (08-14 @ 06:59) 77.1kg (8/7) 77.1kg (8/4)       Pertinent Medications: acetaminophen   Tablet ..  loratadine    Pertinent Labs:  08-17 Na139 mmol/L Glu 94 mg/dL K+ 3.5 mmol/L Cr  0.72 mg/dL BUN 5 mg/dL<L> 08-17 Phos 3.1 mg/dL    No edema noted.   Skin: surgical incision.     Estimated Needs:   [X ] no change since previous assessment  [ ] recalculated:       Previous Nutrition Diagnosis:     [X] Malnutrition, Severe      Nutrition Diagnosis is [X ] ongoing  [ ] resolved [ ] not applicable       Additional Recommendations:     1. Continue current diet order, which remains appropriate at this time.   2. Monitor weights, labs, BM's, skin integrity, p.o. intake.   3. Please Encourage po intake, assist with meals and menu selections, provide alternatives PRN.

## 2020-08-17 NOTE — PROGRESS NOTE ADULT - SUBJECTIVE AND OBJECTIVE BOX
GENERAL SURGERY DAILY PROGRESS NOTE:       Subjective: Patient examined at bedside. No acute events overnight. ****      Objective:    Vital Signs Last 24 Hrs  T(C): 36.6 (17 Aug 2020 02:04), Max: 36.9 (16 Aug 2020 14:02)  T(F): 97.8 (17 Aug 2020 02:04), Max: 98.4 (16 Aug 2020 14:02)  HR: 63 (17 Aug 2020 02:04) (63 - 77)  BP: 126/84 (17 Aug 2020 02:04) (108/69 - 126/84)  BP(mean): --  RR: 16 (17 Aug 2020 02:04) (16 - 18)  SpO2: 98% (17 Aug 2020 02:04) (96% - 98%)    I&O's Detail    15 Aug 2020 07:01  -  16 Aug 2020 07:00  --------------------------------------------------------  IN:    IV PiggyBack: 400 mL    lactated ringers.: 480 mL    Oral Fluid: 600 mL  Total IN: 1480 mL    OUT:    Ileostomy: 35 mL    Indwelling Catheter - Urethral: 300 mL    Voided: 900 mL  Total OUT: 1235 mL    Total NET: 245 mL      16 Aug 2020 07:01  -  17 Aug 2020 05:49  --------------------------------------------------------  IN:    IV PiggyBack: 250 mL    Oral Fluid: 120 mL  Total IN: 370 mL    OUT:    Ileostomy: 100 mL    Voided: 1150 mL  Total OUT: 1250 mL    Total NET: -880 mL          Physical Exam:    General: NAD, laying comfortably in bed  Resp: Breathing comfortably on RA, no increased work of breathing   Abd: soft, non-distended, appropriately tender to palpation, incisions c/d/i, ostomy pink and viable  Ext: warm and well perfused, grossly symmetric       Laboratory Results:                          13.6   7.02  )-----------( 228      ( 16 Aug 2020 06:30 )             41.4     08-16    139  |  100  |  4<L>  ----------------------------<  77  4.1   |  27  |  0.80    Ca    8.9      16 Aug 2020 06:30  Phos  3.1     08-16  Mg     2.1     08-16            Radiology and Additional Tests:    Medications:    MEDICATIONS  (STANDING):  acetaminophen   Tablet .. 975 milliGRAM(s) Oral every 6 hours  enoxaparin Injectable 40 milliGRAM(s) SubCutaneous daily  ertapenem  IVPB 1000 milliGRAM(s) IV Intermittent every 24 hours  lidocaine   Patch 1 Patch Transdermal daily  loratadine 10 milliGRAM(s) Oral daily  vancomycin  IVPB 1000 milliGRAM(s) IV Intermittent every 12 hours  zinc oxide 20% Ointment 1 Application(s) Topical every 8 hours    MEDICATIONS  (PRN):  HYDROmorphone  Injectable 0.5 milliGRAM(s) IV Push every 4 hours PRN Severe Pain (7 - 10)  ondansetron Injectable 4 milliGRAM(s) IV Push once PRN Nausea and/or Vomiting  oxyCODONE    IR 5 milliGRAM(s) Oral every 6 hours PRN Moderate Pain (4 - 6) GENERAL SURGERY DAILY PROGRESS NOTE:       Subjective: Patient examined at bedside. No acute events overnight. Pain controlled. Toelrating diet, no nausea/vomiting.  +/+ from ostomy. +OOB      Objective:    Vital Signs Last 24 Hrs  T(C): 36.6 (17 Aug 2020 02:04), Max: 36.9 (16 Aug 2020 14:02)  T(F): 97.8 (17 Aug 2020 02:04), Max: 98.4 (16 Aug 2020 14:02)  HR: 63 (17 Aug 2020 02:04) (63 - 77)  BP: 126/84 (17 Aug 2020 02:04) (108/69 - 126/84)  BP(mean): --  RR: 16 (17 Aug 2020 02:04) (16 - 18)  SpO2: 98% (17 Aug 2020 02:04) (96% - 98%)    I&O's Detail    15 Aug 2020 07:01  -  16 Aug 2020 07:00  --------------------------------------------------------  IN:    IV PiggyBack: 400 mL    lactated ringers.: 480 mL    Oral Fluid: 600 mL  Total IN: 1480 mL    OUT:    Ileostomy: 35 mL    Indwelling Catheter - Urethral: 300 mL    Voided: 900 mL  Total OUT: 1235 mL    Total NET: 245 mL      16 Aug 2020 07:01  -  17 Aug 2020 05:49  --------------------------------------------------------  IN:    IV PiggyBack: 250 mL    Oral Fluid: 120 mL  Total IN: 370 mL    OUT:    Ileostomy: 100 mL    Voided: 1150 mL  Total OUT: 1250 mL    Total NET: -880 mL          Physical Exam:    General: NAD, laying comfortably in bed  Resp: Breathing comfortably on RA, no increased work of breathing   Abd: soft, non-distended, appropriately tender to palpation, incisions c/d/i, ostomy pink and viable w/ output  Ext: warm and well perfused, grossly symmetric       Laboratory Results:                          13.6   7.02  )-----------( 228      ( 16 Aug 2020 06:30 )             41.4     08-16    139  |  100  |  4<L>  ----------------------------<  77  4.1   |  27  |  0.80    Ca    8.9      16 Aug 2020 06:30  Phos  3.1     08-16  Mg     2.1     08-16            Radiology and Additional Tests:    Medications:    MEDICATIONS  (STANDING):  acetaminophen   Tablet .. 975 milliGRAM(s) Oral every 6 hours  enoxaparin Injectable 40 milliGRAM(s) SubCutaneous daily  ertapenem  IVPB 1000 milliGRAM(s) IV Intermittent every 24 hours  lidocaine   Patch 1 Patch Transdermal daily  loratadine 10 milliGRAM(s) Oral daily  vancomycin  IVPB 1000 milliGRAM(s) IV Intermittent every 12 hours  zinc oxide 20% Ointment 1 Application(s) Topical every 8 hours    MEDICATIONS  (PRN):  HYDROmorphone  Injectable 0.5 milliGRAM(s) IV Push every 4 hours PRN Severe Pain (7 - 10)  ondansetron Injectable 4 milliGRAM(s) IV Push once PRN Nausea and/or Vomiting  oxyCODONE    IR 5 milliGRAM(s) Oral every 6 hours PRN Moderate Pain (4 - 6)

## 2020-08-17 NOTE — DISCHARGE NOTE PROVIDER - CARE PROVIDER_API CALL
Aziza Valdez)  Surgery  [address] [address] [place] [address]  Phone: [phone]  Fax: [phone]  Follow Up Time: 1 week Aziza Valdez)  Surgery  [address] [address] [place] [address]  Phone: [phone]  Fax: [phone]  Follow Up Time: 1 week Aziza Valdez)  Surgery  [address] [address] [place] [address]  Phone: [phone]  Fax: [phone]  Follow Up Time: 1 week

## 2020-08-17 NOTE — PROGRESS NOTE ADULT - ASSESSMENT
75 Year-Old Gentleman with complex perianal fistula, with additional concern for metastatic cancer of unknown primary origin, now s/p EUS and biopsy on 8/7, and s/p CT guided core needle lung mass biopsy by IR, GI colonoscopy and biopsy, now s/p lap sigmoid colostomy and umbilical hernia repair on 8/14.    PLAN:  -MRI for rectal cancer staging   -LRD  -OOB as tolerated   -Abx: Vanco and ertapenem   -F/U labs  -PT: home    A Surgery  [id number] 75 Year-Old Gentleman with complex perianal fistula, with additional concern for metastatic cancer of unknown primary origin, now s/p EUS and biopsy on 8/7, and s/p CT guided core needle lung mass biopsy by IR, GI colonoscopy and biopsy, now s/p lap sigmoid colostomy and umbilical hernia repair on 8/14.    PLAN:  -MRI for rectal cancer staging   -LRD  -OOB as tolerated   -Abx: Vanco and ertapenem; follow up ID re: outpatient plan  -F/U labs  -PT: home    A Surgery  [id number]

## 2020-08-17 NOTE — DISCHARGE NOTE PROVIDER - NSDCFUADDAPPT_GEN_ALL_CORE_FT
Please fax weekly labs to Dr Segovia (Infectious Disease) at [phone]. Aziza Valdez)  Surgery  [address] [address] [place] [address]  Phone: [phone]      Please fax weekly labs to Dr Segovia (Infectious Disease) at [phone]. Aziza Valdez)  Surgery  [address] [place] [address]  Follow up time: 1 Week  Phone: [phone]    [facility]  Hematology/Oncology  [address] [place] [address]  Phone: [phone]  Follow Up Time: 1 week    Please fax weekly labs to Dr Segovia (Infectious Disease) at [phone].

## 2020-08-17 NOTE — ADVANCED PRACTICE NURSE CONSULT - REASON FOR CONSULT
Spoke to primary RN for ostomy post op teaching initial visit. As per RN patient will be going to receive a PICC line in a few minutes. Will notify upon return.

## 2020-08-17 NOTE — PROGRESS NOTE ADULT - ATTENDING COMMENTS
S/p laparoscopic diverting loop sigmoid colostomy and umbilical hernia repair on 8/14. Pt doing well and healing appropriately. Having ostomy function and is tolerating a diet. Pain continues to improve.    - MRI today to complete staging work-up  - IR today for PICC for home IV abx per ID (appreciate assistance)  - continue LRD  - WOCN for ostomy teaching (appreciate assistance)  - discharge tomorrow or Wednesday  - will plan to present pt at rectal tumor board  - outpt f/u w/ med onc to discuss treatment plan  - plan discussed with  pt's wife and son and all questions were answered

## 2020-08-17 NOTE — DISCHARGE NOTE PROVIDER - NSFOLLOWUPCLINICS_GEN_ALL_ED_FT
[facility]  Hematology/Oncology  [address] [id number]  Phone: [phone]  Fax:   Follow Up Time: 1 week

## 2020-08-18 ENCOUNTER — TRANSCRIPTION ENCOUNTER (OUTPATIENT)
Age: 75
End: 2020-08-18

## 2020-08-18 LAB — VANCOMYCIN TROUGH SERPL-MCNC: 18.6 UG/ML — SIGNIFICANT CHANGE UP (ref 10–20)

## 2020-08-18 PROCEDURE — 99232 SBSQ HOSP IP/OBS MODERATE 35: CPT

## 2020-08-18 RX ORDER — SODIUM CHLORIDE 9 MG/ML
10 INJECTION INTRAMUSCULAR; INTRAVENOUS; SUBCUTANEOUS
Qty: 1050 | Refills: 0
Start: 2020-08-18 | End: 2020-09-21

## 2020-08-18 RX ORDER — VANCOMYCIN HCL 1 G
1000 VIAL (EA) INTRAVENOUS
Refills: 0 | Status: DISCONTINUED | OUTPATIENT
Start: 2020-08-18 | End: 2020-08-19

## 2020-08-18 RX ADMIN — OXYCODONE HYDROCHLORIDE 5 MILLIGRAM(S): 5 TABLET ORAL at 21:18

## 2020-08-18 RX ADMIN — ZINC OXIDE 1 APPLICATION(S): 200 OINTMENT TOPICAL at 14:01

## 2020-08-18 RX ADMIN — Medication 975 MILLIGRAM(S): at 14:34

## 2020-08-18 RX ADMIN — Medication 975 MILLIGRAM(S): at 20:40

## 2020-08-18 RX ADMIN — OXYCODONE HYDROCHLORIDE 5 MILLIGRAM(S): 5 TABLET ORAL at 02:08

## 2020-08-18 RX ADMIN — OXYCODONE HYDROCHLORIDE 5 MILLIGRAM(S): 5 TABLET ORAL at 08:25

## 2020-08-18 RX ADMIN — CHLORHEXIDINE GLUCONATE 1 APPLICATION(S): 213 SOLUTION TOPICAL at 07:16

## 2020-08-18 RX ADMIN — Medication 975 MILLIGRAM(S): at 14:04

## 2020-08-18 RX ADMIN — OXYCODONE HYDROCHLORIDE 5 MILLIGRAM(S): 5 TABLET ORAL at 14:34

## 2020-08-18 RX ADMIN — ZINC OXIDE 1 APPLICATION(S): 200 OINTMENT TOPICAL at 22:17

## 2020-08-18 RX ADMIN — Medication 250 MILLIGRAM(S): at 22:18

## 2020-08-18 RX ADMIN — ENOXAPARIN SODIUM 40 MILLIGRAM(S): 100 INJECTION SUBCUTANEOUS at 12:07

## 2020-08-18 RX ADMIN — OXYCODONE HYDROCHLORIDE 5 MILLIGRAM(S): 5 TABLET ORAL at 20:38

## 2020-08-18 RX ADMIN — ERTAPENEM SODIUM 120 MILLIGRAM(S): 1 INJECTION, POWDER, LYOPHILIZED, FOR SOLUTION INTRAMUSCULAR; INTRAVENOUS at 20:40

## 2020-08-18 RX ADMIN — Medication 250 MILLIGRAM(S): at 01:27

## 2020-08-18 RX ADMIN — Medication 975 MILLIGRAM(S): at 07:55

## 2020-08-18 RX ADMIN — Medication 250 MILLIGRAM(S): at 12:07

## 2020-08-18 RX ADMIN — OXYCODONE HYDROCHLORIDE 5 MILLIGRAM(S): 5 TABLET ORAL at 01:38

## 2020-08-18 RX ADMIN — ZINC OXIDE 1 APPLICATION(S): 200 OINTMENT TOPICAL at 07:16

## 2020-08-18 RX ADMIN — Medication 975 MILLIGRAM(S): at 08:25

## 2020-08-18 RX ADMIN — Medication 975 MILLIGRAM(S): at 01:27

## 2020-08-18 RX ADMIN — Medication 975 MILLIGRAM(S): at 21:18

## 2020-08-18 RX ADMIN — OXYCODONE HYDROCHLORIDE 5 MILLIGRAM(S): 5 TABLET ORAL at 07:55

## 2020-08-18 RX ADMIN — Medication 975 MILLIGRAM(S): at 01:57

## 2020-08-18 RX ADMIN — OXYCODONE HYDROCHLORIDE 5 MILLIGRAM(S): 5 TABLET ORAL at 14:03

## 2020-08-18 NOTE — PROGRESS NOTE ADULT - ASSESSMENT
75 Year-Old Gentleman with complex perianal fistula, with additional concern for metastatic cancer of unknown primary origin, now s/p EUS and biopsy on 8/7, and s/p CT guided core needle lung mass biopsy by IR, GI colonoscopy and biopsy, now s/p lap sigmoid colostomy and umbilical hernia repair on 8/14.    PLAN:  -MRI completed for rectal cancer staging   -LRD  -OOB as tolerated   -Abx: Vanco and ertapenem; PICC line yesterday for 6w ABx  -F/U labs  -PT: home    A Surgery  [id number] 75 Year-Old Gentleman with complex perianal fistula, with additional concern for metastatic cancer of unknown primary origin, now s/p EUS and biopsy on 8/7, and s/p CT guided core needle lung mass biopsy by IR, GI colonoscopy and biopsy, now s/p lap sigmoid colostomy and umbilical hernia repair on 8/14.    PLAN:  -MRI completed for rectal cancer staging   -LRD  -OOB as tolerated   -Abx: Vanco and ertapenem; PICC line yesterday for 6w ABx  -F/U labs  -PT: home  -Discharge planning    A Surgery  [id number]

## 2020-08-18 NOTE — PROGRESS NOTE ADULT - SUBJECTIVE AND OBJECTIVE BOX
CC: F/U for OM    Saw/spoke to patient. No fevers, no chills. No new complaints.    Allergies  dust (Rhinitis; Headache)  No Known Drug Allergies    ANTIMICROBIALS:  ertapenem  IVPB 1000 every 24 hours  vancomycin  IVPB 1000 <User Schedule>    PE:    Vital Signs Last 24 Hrs  T(C): 36.6 (18 Aug 2020 14:08), Max: 37.1 (18 Aug 2020 07:22)  T(F): 97.8 (18 Aug 2020 14:08), Max: 98.7 (18 Aug 2020 07:22)  HR: 69 (18 Aug 2020 14:08) (69 - 92)  BP: 124/67 (18 Aug 2020 14:08) (96/54 - 124/67)  RR: 16 (18 Aug 2020 14:08) (16 - 18)  SpO2: 99% (18 Aug 2020 14:08) (95% - 99%)    Gen: AOx3, NAD, non-toxic  CV: S1+S2 normal, nontachycardic  Resp: Clear bilat, no resp distress, no crackles/wheezes  Abd: Soft, nontender, +BS  Ext: No LE edema, no wounds    LABS:                        14.8   5.89  )-----------( 243      ( 17 Aug 2020 07:10 )             44.8     08-17    139  |  100  |  5<L>  ----------------------------<  94  3.5   |  29  |  0.72    Ca    9.1      17 Aug 2020 07:10  Phos  3.1     08-17  Mg     1.9     08-17    MICROBIOLOGY:  Vancomycin Level, Trough: 18.6 ug/mL (08-17-20 @ 23:16)    .Tissue L4-L5 VERTEBRAL DISC SPACE BIOPS  08-11-20   Testing in progress     .Tissue L4-L5 VERTEBRAL DISC SPACE BIOPSY  08-10-20   No growth at 5 days  --    No polymorphonuclear cells seen per low power field  No organisms seen per oil power field    .Blood Blood-Peripheral  08-06-20   No Growth Final    (otherwise reviewed)    RADIOLOGY:    MR 8/17:    IMPRESSION:  Evaluation is limited for rectal cancer due to obscuring perirectal/perianal fistulous disease and limited provided MR protocol.    Complex perirectal/perianal fistulous disease arising from the left posterolateral supralevator position with extension inferiorly in the intersphincteric space and giving rise to secondary tracts which continue to the skin, as above. Ill-defined enhancing soft tissue extends from the primary fistulous tract on the left to involve the left levator musculature, indeterminate. Differential includes inflammatory changes and also underlying tumor/malignancy.    No pelvic lymphadenopathy.    Interval left lower quadrant colostomy.

## 2020-08-18 NOTE — ADVANCED PRACTICE NURSE CONSULT - RECOMMEDATIONS
Ostomy team will follow up.
Ostomy team will follow up with patient   Please contact Wound Care Service Line if we can be of further assistance (ext [phone]).
Please contact Wound Care Service Line if we can be of further assistance (ext [phone]).
Please place ostomy nurse consult post-operatively for ostomy teaching.

## 2020-08-18 NOTE — DISCHARGE NOTE NURSING/CASE MANAGEMENT/SOCIAL WORK - NSDCPNINST_GEN_ALL_CORE
Please inform provider if you notice redness, swelling, or purulent drainage around incision sites. Please inform provider if stoma site changes from pink/red. Please inform provider if the PICC insertion sites becomes red, warm, painful, or shows some oozing fluid.

## 2020-08-18 NOTE — DISCHARGE NOTE NURSING/CASE MANAGEMENT/SOCIAL WORK - NSSCNAMETXT_GEN_ALL_CORE
You will have home care services w/ 2 Home care agencies  [facility] is the home care agency that will be providing your services for colostomy management.  [phone]. The visiting nurse will call to set up a time for the first visit.  [facility] ([facility]) is the home care agency that will be providing your infusion services.  [phone] The visiting nurse will call to set up a time to visit and start your infusion services. You will have home care services w/ 2 Home care agencies:  [facility] is the home care agency that will be providing your services for colostomy management.  [phone]. The visiting nurse will call to set up a time for the first visit.  [facility] ([facility]) is the home care agency that will be providing your infusion services.  [phone] The visiting nurse will call to set up a time to visit and start your infusion services.

## 2020-08-18 NOTE — DISCHARGE NOTE NURSING/CASE MANAGEMENT/SOCIAL WORK - NSDCFUADDAPPT_GEN_ALL_CORE_FT
Aziza Valdez)  Surgery  [address] [address] [place] [address]  Phone: [phone]      Please fax weekly labs to Dr Segovia (Infectious Disease) at [phone].

## 2020-08-18 NOTE — DISCHARGE NOTE NURSING/CASE MANAGEMENT/SOCIAL WORK - PATIENT PORTAL LINK FT
You can access the FollowMyHealth Patient Portal offered by [facility] by registering at the following website: [email or web address]. By joining [facility]’s FollowMyHealth portal, you will also be able to view your health information using other applications (apps) compatible with our system.

## 2020-08-18 NOTE — PROGRESS NOTE ADULT - ATTENDING COMMENTS
Pt doing well. Continues to have ostomy function. Stoma pink and healthy. Tolerating diet. Obtained PICC yesterday for home IV abx. Also obtained ostomy teaching last night.    - continue LRD  - continue IV abx per ID (appreciate assistance)  - d/c home later today vs tomorrow  - will need f/u in my office in 1-2weeks and med/onc f/u    Aziza Valdez MD

## 2020-08-18 NOTE — PROGRESS NOTE ADULT - SUBJECTIVE AND OBJECTIVE BOX
Note in Progress  GENERAL SURGERY DAILY PROGRESS NOTE:       Subjective: Patient examined at bedside. No acute events overnight. ***      Objective:    Vital Signs Last 24 Hrs  T(C): 36.8 (18 Aug 2020 02:07), Max: 36.8 (18 Aug 2020 02:07)  T(F): 98.3 (18 Aug 2020 02:07), Max: 98.3 (18 Aug 2020 02:07)  HR: 80 (18 Aug 2020 02:07) (60 - 92)  BP: 111/72 (18 Aug 2020 02:07) (96/54 - 136/69)  BP(mean): --  RR: 18 (18 Aug 2020 02:07) (16 - 18)  SpO2: 97% (18 Aug 2020 02:07) (95% - 98%)    I&O's Detail    16 Aug 2020 07:01  -  17 Aug 2020 07:00  --------------------------------------------------------  IN:    IV PiggyBack: 250 mL    Oral Fluid: 120 mL  Total IN: 370 mL    OUT:    Ileostomy: 200 mL    Voided: 1650 mL  Total OUT: 1850 mL    Total NET: -1480 mL      17 Aug 2020 07:01  -  18 Aug 2020 04:58  --------------------------------------------------------  IN:    IV PiggyBack: 250 mL    Oral Fluid: 720 mL  Total IN: 970 mL    OUT:    Ileostomy: 550 mL    Voided: 600 mL  Total OUT: 1150 mL    Total NET: -180 mL          Physical Exam:    General: NAD, well-nourished  HEENT: Atraumatic, EOMI  Resp: Breathing comfortably on RA  CV: Normal sinus rhythm  Abd: soft,   Ext: ROMIx4, motor strength intact x 4      Labaratory Results:                          14.8   5.89  )-----------( 243      ( 17 Aug 2020 07:10 )             44.8     08-17    139  |  100  |  5<L>  ----------------------------<  94  3.5   |  29  |  0.72    Ca    9.1      17 Aug 2020 07:10  Phos  3.1     08-17  Mg     1.9     08-17            Radiology and Additional Tests:    Medications:    MEDICATIONS  (STANDING):  acetaminophen   Tablet .. 975 milliGRAM(s) Oral every 6 hours  chlorhexidine 4% Liquid 1 Application(s) Topical <User Schedule>  enoxaparin Injectable 40 milliGRAM(s) SubCutaneous daily  ertapenem  IVPB 1000 milliGRAM(s) IV Intermittent every 24 hours  lidocaine   Patch 1 Patch Transdermal daily  loratadine 10 milliGRAM(s) Oral daily  vancomycin  IVPB 1000 milliGRAM(s) IV Intermittent every 12 hours  zinc oxide 20% Ointment 1 Application(s) Topical every 8 hours    MEDICATIONS  (PRN):  HYDROmorphone  Injectable 0.5 milliGRAM(s) IV Push every 4 hours PRN Severe Pain (7 - 10)  ondansetron Injectable 4 milliGRAM(s) IV Push once PRN Nausea and/or Vomiting  oxyCODONE    IR 5 milliGRAM(s) Oral every 6 hours PRN Moderate Pain (4 - 6)  sodium chloride 0.9% lock flush 10 milliLiter(s) IV Push every 1 hour PRN Pre/post blood products, medications, blood draw, and to maintain line patency Note in Progress  GENERAL SURGERY DAILY PROGRESS NOTE:       Subjective: Patient examined at bedside. No acute events overnight.      Objective:    Vital Signs Last 24 Hrs  T(C): 36.8 (18 Aug 2020 02:07), Max: 36.8 (18 Aug 2020 02:07)  T(F): 98.3 (18 Aug 2020 02:07), Max: 98.3 (18 Aug 2020 02:07)  HR: 80 (18 Aug 2020 02:07) (60 - 92)  BP: 111/72 (18 Aug 2020 02:07) (96/54 - 136/69)  RR: 18 (18 Aug 2020 02:07) (16 - 18)  SpO2: 97% (18 Aug 2020 02:07) (95% - 98%)    I&O's Detail    16 Aug 2020 07:01  -  17 Aug 2020 07:00  --------------------------------------------------------  IN:    IV PiggyBack: 250 mL    Oral Fluid: 120 mL  Total IN: 370 mL    OUT:    Ileostomy: 200 mL    Voided: 1650 mL  Total OUT: 1850 mL    Total NET: -1480 mL      17 Aug 2020 07:01  -  18 Aug 2020 04:58  --------------------------------------------------------  IN:    IV PiggyBack: 250 mL    Oral Fluid: 720 mL  Total IN: 970 mL    OUT:    Ileostomy: 550 mL    Voided: 600 mL  Total OUT: 1150 mL    Total NET: -180 mL          Physical Exam:    General: NAD, well-nourished  HEENT: Atraumatic, EOMI  Resp: Breathing comfortably on RA  CV: Normal sinus rhythm  Abd: soft,   Ext: ROMIx4, motor strength intact x 4      Labaratory Results:                          14.8   5.89  )-----------( 243      ( 17 Aug 2020 07:10 )             44.8     08-17    139  |  100  |  5<L>  ----------------------------<  94  3.5   |  29  |  0.72    Ca    9.1      17 Aug 2020 07:10  Phos  3.1     08-17  Mg     1.9     08-17            Radiology and Additional Tests:    Medications:    MEDICATIONS  (STANDING):  acetaminophen   Tablet .. 975 milliGRAM(s) Oral every 6 hours  chlorhexidine 4% Liquid 1 Application(s) Topical <User Schedule>  enoxaparin Injectable 40 milliGRAM(s) SubCutaneous daily  ertapenem  IVPB 1000 milliGRAM(s) IV Intermittent every 24 hours  lidocaine   Patch 1 Patch Transdermal daily  loratadine 10 milliGRAM(s) Oral daily  vancomycin  IVPB 1000 milliGRAM(s) IV Intermittent every 12 hours  zinc oxide 20% Ointment 1 Application(s) Topical every 8 hours    MEDICATIONS  (PRN):  HYDROmorphone  Injectable 0.5 milliGRAM(s) IV Push every 4 hours PRN Severe Pain (7 - 10)  ondansetron Injectable 4 milliGRAM(s) IV Push once PRN Nausea and/or Vomiting  oxyCODONE    IR 5 milliGRAM(s) Oral every 6 hours PRN Moderate Pain (4 - 6)  sodium chloride 0.9% lock flush 10 milliLiter(s) IV Push every 1 hour PRN Pre/post blood products, medications, blood draw, and to maintain line patency GENERAL SURGERY DAILY PROGRESS NOTE:       Subjective: Patient examined at bedside. No acute events overnight. Pain is controlled.  Denies nausea/vomiting.  OOB, voiding appropriately.  Denies fever/chills, CP/SOB      Objective:    Vital Signs Last 24 Hrs  T(C): 36.8 (18 Aug 2020 02:07), Max: 36.8 (18 Aug 2020 02:07)  T(F): 98.3 (18 Aug 2020 02:07), Max: 98.3 (18 Aug 2020 02:07)  HR: 80 (18 Aug 2020 02:07) (60 - 92)  BP: 111/72 (18 Aug 2020 02:07) (96/54 - 136/69)  RR: 18 (18 Aug 2020 02:07) (16 - 18)  SpO2: 97% (18 Aug 2020 02:07) (95% - 98%)    I&O's Detail    16 Aug 2020 07:01  -  17 Aug 2020 07:00  --------------------------------------------------------  IN:    IV PiggyBack: 250 mL    Oral Fluid: 120 mL  Total IN: 370 mL    OUT:    Ileostomy: 200 mL    Voided: 1650 mL  Total OUT: 1850 mL    Total NET: -1480 mL      17 Aug 2020 07:01  -  18 Aug 2020 04:58  --------------------------------------------------------  IN:    IV PiggyBack: 250 mL    Oral Fluid: 720 mL  Total IN: 970 mL    OUT:    Ileostomy: 550 mL    Voided: 600 mL  Total OUT: 1150 mL    Total NET: -180 mL          Physical Exam:    General: NAD, laying comfortably in bed  Resp: Breathing comfortably on RA  Abd: soft, non distended, non tender, ostomy pink and viable w/output  Ext: warm and well perfused      Labaratory Results:                          14.8   5.89  )-----------( 243      ( 17 Aug 2020 07:10 )             44.8     08-17    139  |  100  |  5<L>  ----------------------------<  94  3.5   |  29  |  0.72    Ca    9.1      17 Aug 2020 07:10  Phos  3.1     08-17  Mg     1.9     08-17            Radiology and Additional Tests:    Medications:    MEDICATIONS  (STANDING):  acetaminophen   Tablet .. 975 milliGRAM(s) Oral every 6 hours  chlorhexidine 4% Liquid 1 Application(s) Topical <User Schedule>  enoxaparin Injectable 40 milliGRAM(s) SubCutaneous daily  ertapenem  IVPB 1000 milliGRAM(s) IV Intermittent every 24 hours  lidocaine   Patch 1 Patch Transdermal daily  loratadine 10 milliGRAM(s) Oral daily  vancomycin  IVPB 1000 milliGRAM(s) IV Intermittent every 12 hours  zinc oxide 20% Ointment 1 Application(s) Topical every 8 hours    MEDICATIONS  (PRN):  HYDROmorphone  Injectable 0.5 milliGRAM(s) IV Push every 4 hours PRN Severe Pain (7 - 10)  ondansetron Injectable 4 milliGRAM(s) IV Push once PRN Nausea and/or Vomiting  oxyCODONE    IR 5 milliGRAM(s) Oral every 6 hours PRN Moderate Pain (4 - 6)  sodium chloride 0.9% lock flush 10 milliLiter(s) IV Push every 1 hour PRN Pre/post blood products, medications, blood draw, and to maintain line patency

## 2020-08-18 NOTE — ADVANCED PRACTICE NURSE CONSULT - REASON FOR CONSULT
Follow up post op visit for ostomy teaching.   Patient received laying in bed. Patient reports feeling well and reports enjoying breakfast. Patient able to verbalized steps for ostomy care and pouch change with minimal verbal cues. Patient asking appropriate questions. Patient pending discharge to home. Supplies provided for discharge. Patient reports feeling confident about being able to perform pouch change.

## 2020-08-19 VITALS
SYSTOLIC BLOOD PRESSURE: 126 MMHG | RESPIRATION RATE: 17 BRPM | OXYGEN SATURATION: 100 % | DIASTOLIC BLOOD PRESSURE: 72 MMHG | TEMPERATURE: 98 F | HEART RATE: 72 BPM

## 2020-08-19 RX ADMIN — Medication 975 MILLIGRAM(S): at 07:08

## 2020-08-19 RX ADMIN — CHLORHEXIDINE GLUCONATE 1 APPLICATION(S): 213 SOLUTION TOPICAL at 06:37

## 2020-08-19 RX ADMIN — OXYCODONE HYDROCHLORIDE 5 MILLIGRAM(S): 5 TABLET ORAL at 07:08

## 2020-08-19 RX ADMIN — OXYCODONE HYDROCHLORIDE 5 MILLIGRAM(S): 5 TABLET ORAL at 06:38

## 2020-08-19 RX ADMIN — ZINC OXIDE 1 APPLICATION(S): 200 OINTMENT TOPICAL at 06:37

## 2020-08-19 RX ADMIN — Medication 975 MILLIGRAM(S): at 06:38

## 2020-08-19 NOTE — PROGRESS NOTE ADULT - PROVIDER SPECIALTY LIST ADULT
Anesthesia
Colorectal Surgery
Gastroenterology
Infectious Disease
Internal Medicine
Surgery
Gastroenterology
Internal Medicine

## 2020-08-19 NOTE — PROGRESS NOTE ADULT - SUBJECTIVE AND OBJECTIVE BOX
GENERAL SURGERY DAILY PROGRESS NOTE:       Subjective: Patient examined at bedside. No acute events overnight.  Pain is controlled.  Denies nausea/vomiting, tolerating diet.  Gas/flatus from bag.   Denies fever/chills, CP/SOB.      Objective:    Vital Signs Last 24 Hrs  T(C): 36.4 (19 Aug 2020 06:39), Max: 36.6 (18 Aug 2020 14:08)  T(F): 97.6 (19 Aug 2020 06:39), Max: 97.8 (18 Aug 2020 14:08)  HR: 72 (19 Aug 2020 06:39) (68 - 76)  BP: 126/72 (19 Aug 2020 06:39) (112/61 - 131/82)  BP(mean): --  RR: 17 (19 Aug 2020 06:39) (16 - 18)  SpO2: 100% (19 Aug 2020 06:39) (95% - 100%)    I&O's Detail    18 Aug 2020 07:01  -  19 Aug 2020 07:00  --------------------------------------------------------  IN:    IV PiggyBack: 600 mL    Oral Fluid: 1200 mL  Total IN: 1800 mL    OUT:    Colostomy: 200 mL    Ileostomy: 600 mL    Voided: 750 mL  Total OUT: 1550 mL    Total NET: 250 mL          Physical Exam:    General: NAD, laying comfortably in bed  Resp: Breathing comfortably on RA, no increased work of breathing  Abd: soft, non-distended, non-tender, no rebound or guarding  ostomy pink and viable w/ output   Ext: warm and well perfused, grossly symmetric       Laboratory Results:                  Radiology and Additional Tests:    Medications:    MEDICATIONS  (STANDING):  acetaminophen   Tablet .. 975 milliGRAM(s) Oral every 6 hours  chlorhexidine 4% Liquid 1 Application(s) Topical <User Schedule>  enoxaparin Injectable 40 milliGRAM(s) SubCutaneous daily  ertapenem  IVPB 1000 milliGRAM(s) IV Intermittent every 24 hours  lidocaine   Patch 1 Patch Transdermal daily  loratadine 10 milliGRAM(s) Oral daily  vancomycin  IVPB 1000 milliGRAM(s) IV Intermittent <User Schedule>  zinc oxide 20% Ointment 1 Application(s) Topical every 8 hours    MEDICATIONS  (PRN):  HYDROmorphone  Injectable 0.5 milliGRAM(s) IV Push every 4 hours PRN Severe Pain (7 - 10)  ondansetron Injectable 4 milliGRAM(s) IV Push once PRN Nausea and/or Vomiting  oxyCODONE    IR 5 milliGRAM(s) Oral every 6 hours PRN Moderate Pain (4 - 6)  sodium chloride 0.9% lock flush 10 milliLiter(s) IV Push every 1 hour PRN Pre/post blood products, medications, blood draw, and to maintain line patency

## 2020-08-19 NOTE — PROGRESS NOTE ADULT - ASSESSMENT
75 Year-Old Gentleman with complex perianal fistula, with additional concern for metastatic cancer of unknown primary origin, now s/p EUS and biopsy on 8/7, and s/p CT guided core needle lung mass biopsy by IR, GI colonoscopy and biopsy, now s/p lap sigmoid colostomy and umbilical hernia repair on 8/14.    PLAN:  -LRD  -OOB as tolerated   -Abx: Vanco and ertapenem; PICC line for 6w ABx; last dose 9pm last night  -Follow up with heme/onc as outpatient   -Follow up with Dr. Valdez   -PT: home  -Discharge planning for today, early this morning    [facility]  [id number]

## 2020-08-19 NOTE — PROGRESS NOTE ADULT - REASON FOR ADMISSION
Perianal discharge and low back pain x4 weeks

## 2020-08-20 ENCOUNTER — OUTPATIENT (OUTPATIENT)
Dept: OUTPATIENT SERVICES | Facility: HOSPITAL | Age: 75
LOS: 1 days | Discharge: HOME | End: 2020-08-20

## 2020-08-20 DIAGNOSIS — C19 MALIGNANT NEOPLASM OF RECTOSIGMOID JUNCTION: ICD-10-CM

## 2020-08-20 DIAGNOSIS — Z94.5 SKIN TRANSPLANT STATUS: Chronic | ICD-10-CM

## 2020-08-24 ENCOUNTER — APPOINTMENT (OUTPATIENT)
Dept: HEMATOLOGY ONCOLOGY | Facility: CLINIC | Age: 75
End: 2020-08-24
Payer: MEDICARE

## 2020-08-24 VITALS
BODY MASS INDEX: 27.63 KG/M2 | RESPIRATION RATE: 17 BRPM | DIASTOLIC BLOOD PRESSURE: 81 MMHG | OXYGEN SATURATION: 98 % | TEMPERATURE: 98.4 F | SYSTOLIC BLOOD PRESSURE: 128 MMHG | HEIGHT: 68.19 IN | WEIGHT: 182.32 LBS | HEART RATE: 57 BPM

## 2020-08-24 DIAGNOSIS — Z85.828 PERSONAL HISTORY OF OTHER MALIGNANT NEOPLASM OF SKIN: ICD-10-CM

## 2020-08-24 DIAGNOSIS — Z78.9 OTHER SPECIFIED HEALTH STATUS: ICD-10-CM

## 2020-08-24 PROCEDURE — 99205 OFFICE O/P NEW HI 60 MIN: CPT

## 2020-08-24 RX ORDER — FAMOTIDINE 20 MG/1
20 TABLET, FILM COATED ORAL
Qty: 60 | Refills: 1 | Status: ACTIVE | COMMUNITY
Start: 2020-08-24 | End: 1900-01-01

## 2020-09-05 ENCOUNTER — EMERGENCY (EMERGENCY)
Facility: HOSPITAL | Age: 75
LOS: 1 days | Discharge: HOME | End: 2020-09-05
Attending: EMERGENCY MEDICINE | Admitting: EMERGENCY MEDICINE
Payer: MEDICARE

## 2020-09-05 VITALS
RESPIRATION RATE: 18 BRPM | TEMPERATURE: 98 F | OXYGEN SATURATION: 98 % | DIASTOLIC BLOOD PRESSURE: 118 MMHG | HEART RATE: 88 BPM | SYSTOLIC BLOOD PRESSURE: 144 MMHG

## 2020-09-05 DIAGNOSIS — Z94.5 SKIN TRANSPLANT STATUS: Chronic | ICD-10-CM

## 2020-09-05 PROCEDURE — 99284 EMERGENCY DEPT VISIT MOD MDM: CPT

## 2020-09-05 RX ORDER — ALTEPLASE 100 MG
2 KIT INTRAVENOUS ONCE
Refills: 0 | Status: COMPLETED | OUTPATIENT
Start: 2020-09-05 | End: 2020-09-05

## 2020-09-05 RX ADMIN — ALTEPLASE 2 MILLIGRAM(S): KIT at 15:07

## 2020-09-05 NOTE — ED ADULT TRIAGE NOTE - CHIEF COMPLAINT QUOTE
PT has a clogged left arm PICC line. PT is s/p colorectal surgery 2 weeks ago. PT states he is normal health with no PMH

## 2020-09-05 NOTE — ED PROVIDER NOTE - PROGRESS NOTE DETAILS
Arik:  RN able to draw blood back from PICC line and easily flushed.  Discharge home with continued abx at home.

## 2020-09-05 NOTE — ED PROVIDER NOTE - PATIENT PORTAL LINK FT
You can access the FollowMyHealth Patient Portal offered by [facility] by registering at the following website: [email or web address]. By joining [facility]’s FollowMyHealth portal, you will also be able to view your health information using other applications (apps) compatible with our system.

## 2020-09-05 NOTE — PHYSICAL EXAM
[Exam Deferred] : exam was deferred [Normal Rate and Rhythm] : normal rate and rhythm [No Rash or Lesion] : No rash or lesion [Alert] : alert [Oriented to Person] : oriented to person [Oriented to Place] : oriented to place [Oriented to Time] : oriented to time [Wheezing] : no wheezing was heard [JVD] : no jugular venous distention  [de-identified] : soft, non-distended, well healing incisions w/ dermabond in place, colostomy in place w/ pouch, stoma pink and viable [de-identified] : awake, alert, in NAD [de-identified] : normocephalic, atraumatic, old incision from basal cell excision well healed, EOMI, nl conjunctiva [de-identified] : b/l chest rise, EWOB on RA [de-identified] : deferred [de-identified] : normal mood and affect

## 2020-09-05 NOTE — ED ADULT NURSE NOTE - NSIMPLEMENTINTERV_GEN_ALL_ED
Implemented All Universal Safety Interventions:  [place] to call system. Call bell, personal items and telephone within reach. Instruct patient to call for assistance. Room bathroom lighting operational. Non-slip footwear when patient is off stretcher. Physically safe environment: no spills, clutter or unnecessary equipment. Stretcher in lowest position, wheels locked, appropriate side rails in place.

## 2020-09-05 NOTE — ASSESSMENT
[FreeTextEntry1] : 75y.o. 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

## 2020-09-05 NOTE — HISTORY OF PRESENT ILLNESS
[FreeTextEntry1] : Mr. Sai Stephens is a pleasant 75y.o. M who presented in August with lower back pain and perianal drainage. Work-up ended up demonstrating lumbar osteomyelitis and metastatic near obstructing rectal cancer (to the lungs). He underwent a laparoscopic diverting loop sigmoid colostomy on 8/14/20 without any issues. He was eventually discharged on a regular diet w/ a PICC line in place for home IV abx for his osteomyelitis. He presents today for his post-op visit. He reports that he is doing well. He is regaining his energy, has a good appetite and is having semi-formed daily colostomy output. The perianal drainage has completely stopped. He met with Dr. Ellis of med onc this week and they discussed the possibility of chemo which the patient will decide about once he is cleared from an infection stand point.

## 2020-09-05 NOTE — ED PROVIDER NOTE - OBJECTIVE STATEMENT
75M sent in by home visiting nurse for clogged PICC line in [facility].  Per nurse, patient needs injection of Cathflo to attempt unclogging.  Currently receiving ertapenem and vancomycin through PICC line.  No acute complaints.      Pt had recent hospitalization 8/4/2020 for complex perianal fistula with extensive fat stranding, erosive changes at L4-L5, suspicious for discitis osteomyelitis, bilateral pulmonary nodules, suspicious for metastatic disease.  He was started on empiric antibiotics for possible osteomyelitis of the spine.  On 8/14, he underwent laparoscopic sigmoid loop colostomy and umbilical hernia repair.  Patient was discharged on IV antibiotics (Ertapenem and Vancomycin through 9/21) per ID. PICC line placed on 8/17.

## 2020-09-05 NOTE — ED PROVIDER NOTE - SKIN, MLM
PICC line in place LUE with no surrounding erythema; R forearm with mild swelling and erythema after vancomycin was given this morning via peripheral IV line and infiltrated

## 2020-09-05 NOTE — ED ADULT NURSE REASSESSMENT NOTE - NS ED NURSE REASSESS COMMENT FT1
Cathflow dwell time for approx 30 min. PICC line checked for blood return. Blood return present. Discarded 5ML as per cathflo manual instructions. Catheter able to flush with no problems. Pt discharged home by MD Elise.

## 2020-09-05 NOTE — ED PROVIDER NOTE - ATTENDING CONTRIBUTION TO CARE
Dr. Elise:  I have personally performed a face to face bedside history and physical examination of this patient. I have discussed the history, examination, review of systems, assessment and plan of management with the resident. I have reviewed the electronic medical record and amended it to reflect my history, review of systems, physical exam, assessment and plan.     75M with history of Basal Cell Carcinoma of nose s/p surgical resection with nasal reconstruction who presents to the hospital with complaints of perianal rectal discharge and lower back pain x4 weeks. He was admitted on August 4th, he said that he initially noted feculent discharge from around his anal area and he also had some lower back pain that started around the same time. 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 He was admitted to medicine.    He was started on empiric antibiotics for possible osteomyelitis of the spine  Hospital course while on medicine service:    On 8/14, he underwent laparoscopic sigmoid loop colostomy and umbilical hernia repair.  The patient tolerated the procedure well, there were no complications. The patient was extubated in the OR, transferred to the PACU in stable condition and then transferred to a surgical floor. Once bowel function returned, his diet was advanced as tolerated. The patient had daily wound care and was seen by physical therapy which recommended discharge to home. The patient's pain was controlled by IV pain medications and then by PO pain medications. The patient was placed back on his home medications.    Patient will be discharged on IV antibiotics (Ertapenem and Vancomycin through 9/21) per ID. PICC line placed on 8/17.

## 2020-09-05 NOTE — ED ADULT NURSE NOTE - OBJECTIVE STATEMENT
Pt presents to ED with clogged PICC line. Pt states he receives IV antibiotics 3x a day for an infection in his back that is prescribed by his infectious disease physician. Pt states the home health nurse came to the house yesterday to drop off supplies and checked his PICC line and found it to not flushing in "one of the directions". Pt states he is unsure if the PICC line was flushing in or out. Pt was told he would have to pay out of pocket for the medication to unclog the picc line and that it would not be there until Tuesday. Pt states he came to the ED to have the PICC line fixed because he had it placed here. Dried blood noted around cath site. Pt states it has been there. Pt states PICC line site has no pain. Pt states since he was unable to get his antibiotics the home health nurse put in an IV in his other arm and gave him the medications and then the IV infiltrated and left a red and swollen area on his right arm. Area marked with marker at this time. Pt states the swelling and redness has gone down at this time. MD Elise asked if she wanted a confirmatory xr for picc placement. No x-ray at this time as per MD Elise. PICC line flushed by MD Elise. Cathflo given by MD Elise.

## 2020-09-05 NOTE — ED PROVIDER NOTE - NSFOLLOWUPINSTRUCTIONS_ED_ALL_ED_FT
Your PICC line was flushed with Cathflo, we were able to draw blood back and to flush line afterwards.     Okay to use PICC at home.  If line malfunctions again, return to the emergency department as needed.

## 2020-09-14 ENCOUNTER — APPOINTMENT (OUTPATIENT)
Age: 75
End: 2020-09-14
Payer: MEDICARE

## 2020-09-14 PROCEDURE — 99213 OFFICE O/P EST LOW 20 MIN: CPT | Mod: 95

## 2020-09-20 PROBLEM — Z78.9 NON-SMOKER: Status: ACTIVE | Noted: 2020-09-20

## 2020-09-20 NOTE — PHYSICAL EXAM
[Restricted in physically strenuous activity but ambulatory and able to carry out work of a light or sedentary nature] : Status 1- Restricted in physically strenuous activity but ambulatory and able to carry out work of a light or sedentary nature, e.g., light house work, office work [Normal] : affect appropriate [de-identified] : + ostomy

## 2020-09-20 NOTE — HISTORY OF PRESENT ILLNESS
[Disease: _____________________] : Disease: [unfilled] [T: ___] : T[unfilled] [N: ___] : N[unfilled] [M: ___] : M[unfilled] [AJCC Stage: ____] : AJCC Stage: [unfilled] [de-identified] : 75 M presents for management of metastatic rectal cancer in the setting of ongoing treatment for osteomyelitis. \par \par Hira presented in August 2020 with lower back pain and perianal drainage x 4 weeks. Initial imaging and evaluation demonstrated a complex perianal fistula. Underwent surgical evaluation, rectal EUA on 8/7/20 and noted to have a rectal mass. Bx c/w adenocarcinoma. Colonoscopy on 8/12 noted partially obstructed rectal mass. CT A/P noted erosive changes in L4-5 concerning for osteomyelitis and CT chest c/w multiple b/l pulmonary nodules likely metastatic disease. On 8/10/20 underwent a FNA of lung nodule by IR. Bx confirmed metastatic adenocarcinoma from the rectum. MRI Pelvis on 8/17 showed a complex perianal fistulous disease with multiple tracts to the skin, difficult to evaluate for underlying mass. On 8/14/20 due to concern for obstructing mass, he underwent a laparoscopic diverting loop sigmoid colostomy. In terms of the osteomyelitis, although cultures negative, plan to continue 6 week course of Vancomycin/Ertapenem via PICC line. Had a total 30 lb weight loss since June. \par \par Referred to medical oncology for further management. \par \par  [de-identified] : adenocarcinoma  [de-identified] : CEA 9.3 [de-identified] : +fatigue. Lives with wife and son in [place]. normally drives but currently not driving.  \par Gained 5 lbs since coming home. Appetite is good. ostomy output is thick. empties 1-2 per day. \par + LB pain. Using Tylenol and Oxycodone, does not help. Pain comes and goes, sitting makes it worse, ambulating improves. Describes sharp/dull. \par Sleeps fine at night. \par passing urine without a problem. \par Following up with surgeon on Friday. \par no nausea. no fevers. Anal discharge is resolved. rectal pain comes and goes. \par 30 lb weight loss since late June. \par

## 2020-09-20 NOTE — REASON FOR VISIT
[Initial Consultation] : an initial consultation [Family Member] : family member [FreeTextEntry2] : Stage IV rectal cancer

## 2020-09-21 ENCOUNTER — APPOINTMENT (OUTPATIENT)
Dept: INFECTIOUS DISEASE | Facility: CLINIC | Age: 75
End: 2020-09-21
Payer: MEDICARE

## 2020-09-21 PROCEDURE — 99442: CPT

## 2020-09-22 ENCOUNTER — OUTPATIENT (OUTPATIENT)
Dept: OUTPATIENT SERVICES | Facility: HOSPITAL | Age: 75
LOS: 1 days | Discharge: HOME | End: 2020-09-22

## 2020-09-22 ENCOUNTER — APPOINTMENT (OUTPATIENT)
Dept: HEMATOLOGY ONCOLOGY | Facility: CLINIC | Age: 75
End: 2020-09-22
Payer: MEDICARE

## 2020-09-22 DIAGNOSIS — C19 MALIGNANT NEOPLASM OF RECTOSIGMOID JUNCTION: ICD-10-CM

## 2020-09-22 DIAGNOSIS — Z94.5 SKIN TRANSPLANT STATUS: Chronic | ICD-10-CM

## 2020-09-22 PROCEDURE — 99214 OFFICE O/P EST MOD 30 MIN: CPT | Mod: 95

## 2020-09-22 NOTE — HISTORY OF PRESENT ILLNESS
[Home] : at home, [unfilled] , at the time of the visit. [Medical Office: ([facility])___] : at the medical office located in  [Spouse] : spouse [Family Member] : family member [Verbal consent obtained from patient] : the patient, [unfilled] [Disease: _____________________] : Disease: [unfilled] [T: ___] : T[unfilled] [N: ___] : N[unfilled] [M: ___] : M[unfilled] [AJCC Stage: ____] : AJCC Stage: [unfilled] [de-identified] : 75 M presents for management of metastatic rectal cancer in the setting of ongoing treatment for osteomyelitis. \par \par Hira presented in August 2020 with lower back pain and perianal drainage x 4 weeks. Initial imaging and evaluation demonstrated a complex perianal fistula. Underwent surgical evaluation, rectal EUA on 8/7/20 and noted to have a rectal mass. Bx c/w adenocarcinoma. Colonoscopy on 8/12 noted partially obstructed rectal mass. CT A/P noted erosive changes in L4-5 concerning for osteomyelitis and CT chest c/w multiple b/l pulmonary nodules likely metastatic disease. On 8/10/20 underwent a FNA of lung nodule by IR. Bx confirmed metastatic adenocarcinoma from the rectum. MRI Pelvis on 8/17 showed a complex perianal fistulous disease with multiple tracts to the skin, difficult to evaluate for underlying mass. On 8/14/20 due to concern for obstructing mass, he underwent a laparoscopic diverting loop sigmoid colostomy. In terms of the osteomyelitis, although cultures negative, plan to continue 6 week course of Vancomycin/Ertapenem via PICC line. Had a total 30 lb weight loss since June. \par \par Referred to medical oncology for further management. \par \par  [de-identified] : adenocarcinoma  [de-identified] : CEA 9.3 [de-identified] : Feels ok. Pain is controlled with oxycodone which he takes 2-3 times per day. Denies any fevers/chill. \par Is eating better, fatigue has improved. More active now. \par Does not have any apt scheduled with ID. Does not want to discuss any systemic therapy until after f/u with ID

## 2020-09-22 NOTE — PHYSICAL EXAM
[Restricted in physically strenuous activity but ambulatory and able to carry out work of a light or sedentary nature] : Status 1- Restricted in physically strenuous activity but ambulatory and able to carry out work of a light or sedentary nature, e.g., light house work, office work [Normal] : affect appropriate [de-identified] : normal respiratory pattern

## 2020-09-22 NOTE — REASON FOR VISIT
[Follow-Up Visit] : a follow-up [Spouse] : spouse [Family Member] : family member [FreeTextEntry2] : Stage IV rectal cancer c/b osteomyelitis

## 2020-09-22 NOTE — REASON FOR VISIT
[Follow-Up Visit] : a follow-up [Family Member] : family member [Spouse] : spouse [FreeTextEntry2] : Stage IV rectal cancer

## 2020-09-22 NOTE — PHYSICAL EXAM
[Restricted in physically strenuous activity but ambulatory and able to carry out work of a light or sedentary nature] : Status 1- Restricted in physically strenuous activity but ambulatory and able to carry out work of a light or sedentary nature, e.g., light house work, office work [Normal] : affect appropriate [de-identified] : normal respiratory pattern

## 2020-09-22 NOTE — HISTORY OF PRESENT ILLNESS
[Home] : at home, [unfilled] , at the time of the visit. [Medical Office: ([facility])___] : at the medical office located in  [Spouse] : spouse [Family Member] : family member [Verbal consent obtained from patient] : the patient, [unfilled] [Disease: _____________________] : Disease: [unfilled] [T: ___] : T[unfilled] [N: ___] : N[unfilled] [M: ___] : M[unfilled] [AJCC Stage: ____] : AJCC Stage: [unfilled] [de-identified] : 75 M presents for management of metastatic rectal cancer in the setting of ongoing treatment for osteomyelitis. \par \par Hira presented in August 2020 with lower back pain and perianal drainage x 4 weeks. Initial imaging and evaluation demonstrated a complex perianal fistula. Underwent surgical evaluation, rectal EUA on 8/7/20 and noted to have a rectal mass. Bx c/w adenocarcinoma. Colonoscopy on 8/12 noted partially obstructed rectal mass. CT A/P noted erosive changes in L4-5 concerning for osteomyelitis and CT chest c/w multiple b/l pulmonary nodules likely metastatic disease. On 8/10/20 underwent a FNA of lung nodule by IR. Bx confirmed metastatic adenocarcinoma from the rectum. MRI Pelvis on 8/17 showed a complex perianal fistulous disease with multiple tracts to the skin, difficult to evaluate for underlying mass. On 8/14/20 due to concern for obstructing mass, he underwent a laparoscopic diverting loop sigmoid colostomy. In terms of the osteomyelitis, although cultures negative, plan to continue 6 week course of Vancomycin/Ertapenem via PICC line. Had a total 30 lb weight loss since June. \par \par Referred to medical oncology for further management. \par \par  [de-identified] : adenocarcinoma  [de-identified] : CEA 9.3 [de-identified] : Ab stopped yesterday. Pt has discussed with family and does not want to move forward with any disease modifying therapy at this time. he wants his PICC line removed. \par Does not want to participate in any conversation today. \par Discussion took place with family and wife separately from pt. GOC discussed. Plan for supportive care and eventual hospice referral.

## 2020-09-29 PROCEDURE — 88342 IMHCHEM/IMCYTCHM 1ST ANTB: CPT | Mod: 26,59

## 2020-09-29 PROCEDURE — 88341 IMHCHEM/IMCYTCHM EA ADD ANTB: CPT | Mod: 26

## 2020-10-01 NOTE — ASSESSMENT
[FreeTextEntry1] : 74 yo M with recent hospitalization at [facility] where was found to have adenoCA, and vertebral OM\par Completing IV vanco/ertapenem for empiric treatment of OM\par Seems improved on this telephone visit\par Completing 6 weeks of IV abx today would stop as patient doing well\par Overall, Spinal OM, malignancy\par - DC Vanco/Erta today as planned\par - I conveyed to [facility] care to stop antibiotic services; I advised to retain PICC as the line may be needed for chemo (with oncology)\par - Patient should follow up with me in 1 month to ensure continued stabilization and improvement (pending chemo vs palliative planning)\par

## 2020-10-01 NOTE — DATA REVIEWED
[FreeTextEntry1] : Reviewed [facility] records 8/10 IR asp cultures remain negative\par \par MR: 8/5\par IMPRESSION: Discitis/myelitis is seen at the L5-S1 level with some epidural and left paraspinal extension suspected. There is evidence of a collection seen in the ventral aspect of the spinal canal at the S1 level as described above.

## 2020-10-01 NOTE — HISTORY OF PRESENT ILLNESS
[Home] : at home, [unfilled] , at the time of the visit. [Medical Office: ([facility])___] : at the medical office located in  [Family Member] : family member [Verbal consent obtained from patient] : the patient, [unfilled] [FreeTextEntry1] : 76 yo M who was seen at [facility] for spinal infection. During hospitalization, mutliple issues including dx of adenoca with possible mets, and and had back pain with suspicion for spinal OM. Had aspiration to L spine lesion but was negative, now is currently on empiric IV abx to complete today. Patient states feels improved. No new focal complaints. Following with other physicians regarding chemo plans. Otherwise doing well from ID perspective.

## 2020-10-13 PROCEDURE — 88360 TUMOR IMMUNOHISTOCHEM/MANUAL: CPT | Mod: 26

## 2020-10-13 PROCEDURE — 88342 IMHCHEM/IMCYTCHM 1ST ANTB: CPT | Mod: 26,59

## 2020-12-09 ENCOUNTER — APPOINTMENT (OUTPATIENT)
Age: 75
End: 2020-12-09

## 2020-12-09 ENCOUNTER — APPOINTMENT (OUTPATIENT)
Age: 75
End: 2020-12-09
Payer: MEDICARE

## 2020-12-09 PROCEDURE — 99442: CPT

## 2020-12-09 RX ORDER — ERTAPENEM SODIUM 1 G/1
1 INJECTION, POWDER, LYOPHILIZED, FOR SOLUTION INTRAMUSCULAR; INTRAVENOUS
Qty: 3 | Refills: 0 | Status: DISCONTINUED | COMMUNITY
Start: 2020-08-17 | End: 2020-12-09

## 2020-12-09 RX ORDER — VANCOMYCIN HYDROCHLORIDE 10 G/100ML
10 INJECTION, POWDER, LYOPHILIZED, FOR SOLUTION INTRAVENOUS
Qty: 1 | Refills: 0 | Status: DISCONTINUED | COMMUNITY
Start: 2020-08-17 | End: 2020-12-09

## 2020-12-09 RX ORDER — OXYCODONE 5 MG/1
5 TABLET ORAL
Qty: 20 | Refills: 0 | Status: DISCONTINUED | COMMUNITY
Start: 2020-08-17 | End: 2020-12-09

## 2020-12-09 RX ORDER — GABAPENTIN 100 MG/1
100 CAPSULE ORAL 3 TIMES DAILY
Qty: 45 | Refills: 0 | Status: DISCONTINUED | COMMUNITY
Start: 2020-08-24 | End: 2020-12-09

## 2020-12-12 ENCOUNTER — OUTPATIENT (OUTPATIENT)
Dept: OUTPATIENT SERVICES | Facility: HOSPITAL | Age: 75
LOS: 1 days | Discharge: HOME | End: 2020-12-12

## 2020-12-12 DIAGNOSIS — C19 MALIGNANT NEOPLASM OF RECTOSIGMOID JUNCTION: ICD-10-CM

## 2020-12-12 DIAGNOSIS — Z94.5 SKIN TRANSPLANT STATUS: Chronic | ICD-10-CM

## 2020-12-16 ENCOUNTER — APPOINTMENT (OUTPATIENT)
Age: 75
End: 2020-12-16
Payer: MEDICARE

## 2020-12-16 PROCEDURE — 99442: CPT

## 2021-01-02 NOTE — HISTORY OF PRESENT ILLNESS
[Home] : at home, [unfilled] , at the time of the visit. [Medical Office: ([facility])___] : at the medical office located in  [Spouse] : spouse [Verbal consent obtained from patient] : the patient, [unfilled] [Disease: _____________________] : Disease: [unfilled] [T: ___] : T[unfilled] [N: ___] : N[unfilled] [M: ___] : M[unfilled] [AJCC Stage: ____] : AJCC Stage: [unfilled] [de-identified] : 75 M presents for management of metastatic rectal cancer in the setting of ongoing treatment for osteomyelitis. \par \par Hira presented in August 2020 with lower back pain and perianal drainage x 4 weeks. Initial imaging and evaluation demonstrated a complex perianal fistula. Underwent surgical evaluation, rectal EUA on 8/7/20 and noted to have a rectal mass. Bx c/w adenocarcinoma. Colonoscopy on 8/12 noted partially obstructed rectal mass. CT A/P noted erosive changes in L4-5 concerning for osteomyelitis and CT chest c/w multiple b/l pulmonary nodules likely metastatic disease. On 8/10/20 underwent a FNA of lung nodule by IR. Bx confirmed metastatic adenocarcinoma from the rectum. MRI Pelvis on 8/17 showed a complex perianal fistulous disease with multiple tracts to the skin, difficult to evaluate for underlying mass. On 8/14/20 due to concern for obstructing mass, he underwent a laparoscopic diverting loop sigmoid colostomy. In terms of the osteomyelitis, although cultures negative, plan to continue 6 week course of Vancomycin/Ertapenem via PICC line. Had a total 30 lb weight loss since June. \par \par Referred to medical oncology for further management. \par \par Pt refused systemic chemotherapy and declined hospice. \par  [de-identified] : adenocarcinoma  [de-identified] : CEA 9.3 [FreeTextEntry1] : supportive care [de-identified] : c/o increasing back pain, takes Oxycodone 3-4 times a day with minimal relief. Denies any LE weakness or saddle anesthesia. Moving bowels regularly with good control. Appetite is good. Reports weight as stable. Energy level is somewhat reduced due to pain. We reviewed pain management plan and also discussed obtaining repeat MRI to eval worsening back pain.

## 2021-01-02 NOTE — ASSESSMENT
[Supportive] : Goals of care discussed with patient: Supportive [FreeTextEntry1] : discussed services again, refuses hospice

## 2021-01-04 ENCOUNTER — APPOINTMENT (OUTPATIENT)
Dept: HEMATOLOGY ONCOLOGY | Facility: CLINIC | Age: 76
End: 2021-01-04
Payer: MEDICARE

## 2021-01-04 PROCEDURE — 99442: CPT

## 2021-01-10 NOTE — ASSESSMENT
[Supportive] : Goals of care discussed with patient: Supportive [FreeTextEntry1] : refuses hospice at this time

## 2021-01-10 NOTE — HISTORY OF PRESENT ILLNESS
[Home] : at home, [unfilled] , at the time of the visit. [Medical Office: ([facility])___] : at the medical office located in  [Spouse] : spouse [Verbal consent obtained from patient] : the patient, [unfilled] [Disease: _____________________] : Disease: [unfilled] [T: ___] : T[unfilled] [N: ___] : N[unfilled] [M: ___] : M[unfilled] [AJCC Stage: ____] : AJCC Stage: [unfilled] [de-identified] : 75 M presents for management of metastatic rectal cancer in the setting of ongoing treatment for osteomyelitis. \par \par Hira presented in August 2020 with lower back pain and perianal drainage x 4 weeks. Initial imaging and evaluation demonstrated a complex perianal fistula. Underwent surgical evaluation, rectal EUA on 8/7/20 and noted to have a rectal mass. Bx c/w adenocarcinoma. Colonoscopy on 8/12 noted partially obstructed rectal mass. CT A/P noted erosive changes in L4-5 concerning for osteomyelitis and CT chest c/w multiple b/l pulmonary nodules likely metastatic disease. On 8/10/20 underwent a FNA of lung nodule by IR. Bx confirmed metastatic adenocarcinoma from the rectum. MRI Pelvis on 8/17 showed a complex perianal fistulous disease with multiple tracts to the skin, difficult to evaluate for underlying mass. On 8/14/20 due to concern for obstructing mass, he underwent a laparoscopic diverting loop sigmoid colostomy. In terms of the osteomyelitis, although cultures negative, plan to continue 6 week course of Vancomycin/Ertapenem via PICC line. Had a total 30 lb weight loss since June. \par \par Referred to medical oncology for further management. \par \par Pt refused systemic chemotherapy and declined hospice. \par  [de-identified] : adenocarcinoma  [de-identified] : CEA 9.3 [de-identified] : Pain much better controlled with Methadone. Eating well. Moving around ok.  [FreeTextEntry1] : supportive care

## 2021-01-15 ENCOUNTER — OUTPATIENT (OUTPATIENT)
Dept: OUTPATIENT SERVICES | Facility: HOSPITAL | Age: 76
LOS: 1 days | Discharge: HOME | End: 2021-01-15

## 2021-01-15 DIAGNOSIS — Z94.5 SKIN TRANSPLANT STATUS: Chronic | ICD-10-CM

## 2021-01-15 DIAGNOSIS — C19 MALIGNANT NEOPLASM OF RECTOSIGMOID JUNCTION: ICD-10-CM

## 2021-01-20 ENCOUNTER — APPOINTMENT (OUTPATIENT)
Dept: HEMATOLOGY ONCOLOGY | Facility: CLINIC | Age: 76
End: 2021-01-20
Payer: MEDICARE

## 2021-01-20 PROCEDURE — 99442: CPT

## 2021-01-20 NOTE — HISTORY OF PRESENT ILLNESS
[Disease: _____________________] : Disease: [unfilled] [T: ___] : T[unfilled] [N: ___] : N[unfilled] [M: ___] : M[unfilled] [AJCC Stage: ____] : AJCC Stage: [unfilled] [Home] : at home, [unfilled] , at the time of the visit. [Medical Office: ([facility])___] : at the medical office located in  [Verbal consent obtained from patient] : the patient, [unfilled] [de-identified] : 75 M presents for management of metastatic rectal cancer in the setting of ongoing treatment for osteomyelitis. \par \par Hira presented in August 2020 with lower back pain and perianal drainage x 4 weeks. Initial imaging and evaluation demonstrated a complex perianal fistula. Underwent surgical evaluation, rectal EUA on 8/7/20 and noted to have a rectal mass. Bx c/w adenocarcinoma. Colonoscopy on 8/12 noted partially obstructed rectal mass. CT A/P noted erosive changes in L4-5 concerning for osteomyelitis and CT chest c/w multiple b/l pulmonary nodules likely metastatic disease. On 8/10/20 underwent a FNA of lung nodule by IR. Bx confirmed metastatic adenocarcinoma from the rectum. MRI Pelvis on 8/17 showed a complex perianal fistulous disease with multiple tracts to the skin, difficult to evaluate for underlying mass. On 8/14/20 due to concern for obstructing mass, he underwent a laparoscopic diverting loop sigmoid colostomy. In terms of the osteomyelitis, although cultures negative, plan to continue 6 week course of Vancomycin/Ertapenem via PICC line. Had a total 30 lb weight loss since June. \par \par Referred to medical oncology for further management. \par \par Pt refused systemic chemotherapy and declined hospice. \par  [de-identified] : adenocarcinoma  [de-identified] : CEA 9.3 [de-identified] : is having difficulty staying asleep, wakes up and then difficulty getting back to sleep. Occasionally napping during the day. Wakes up with pain in the middle of the night but does not take anything for it. Does not want to be "addicted to medications". Moving bowels ok. Does not want to have repeat imaging done.

## 2021-02-11 DIAGNOSIS — M54.9 DORSALGIA, UNSPECIFIED: ICD-10-CM

## 2021-02-11 DIAGNOSIS — M46.20 OSTEOMYELITIS OF VERTEBRA, SITE UNSPECIFIED: ICD-10-CM

## 2021-02-11 RX ORDER — METHADONE HYDROCHLORIDE 5 MG/1
5 TABLET ORAL
Qty: 60 | Refills: 0 | Status: DISCONTINUED | COMMUNITY
Start: 2020-12-09 | End: 2021-02-11

## 2021-02-19 ENCOUNTER — OUTPATIENT (OUTPATIENT)
Dept: OUTPATIENT SERVICES | Facility: HOSPITAL | Age: 76
LOS: 1 days | Discharge: HOME | End: 2021-02-19

## 2021-02-19 DIAGNOSIS — C19 MALIGNANT NEOPLASM OF RECTOSIGMOID JUNCTION: ICD-10-CM

## 2021-02-19 DIAGNOSIS — Z94.5 SKIN TRANSPLANT STATUS: Chronic | ICD-10-CM

## 2021-02-19 NOTE — HISTORY OF PRESENT ILLNESS
[Home] : at home, [unfilled] , at the time of the visit. [Medical Office: ([facility])___] : at the medical office located in  [Verbal consent obtained from patient] : the patient, [unfilled] [Disease: _____________________] : Disease: [unfilled] [T: ___] : T[unfilled] [N: ___] : N[unfilled] [M: ___] : M[unfilled] [AJCC Stage: ____] : AJCC Stage: [unfilled] [de-identified] : 75 M presents for management of metastatic rectal cancer in the setting of ongoing treatment for osteomyelitis. \par \par Hira presented in August 2020 with lower back pain and perianal drainage x 4 weeks. Initial imaging and evaluation demonstrated a complex perianal fistula. Underwent surgical evaluation, rectal EUA on 8/7/20 and noted to have a rectal mass. Bx c/w adenocarcinoma. Colonoscopy on 8/12 noted partially obstructed rectal mass. CT A/P noted erosive changes in L4-5 concerning for osteomyelitis and CT chest c/w multiple b/l pulmonary nodules likely metastatic disease. On 8/10/20 underwent a FNA of lung nodule by IR. Bx confirmed metastatic adenocarcinoma from the rectum. MRI Pelvis on 8/17 showed a complex perianal fistulous disease with multiple tracts to the skin, difficult to evaluate for underlying mass. On 8/14/20 due to concern for obstructing mass, he underwent a laparoscopic diverting loop sigmoid colostomy. In terms of the osteomyelitis, although cultures negative, plan to continue 6 week course of Vancomycin/Ertapenem via PICC line. Had a total 30 lb weight loss since June. \par \par Referred to medical oncology for further management. \par \par Pt refused systemic chemotherapy and declined hospice. \par  [de-identified] : adenocarcinoma  [de-identified] : CEA 9.3 [de-identified] : admits to initially not taking pain meds regularly due to just being stubborn. Once pain increased, he started taking regimen of Methadone regularly and now barely requires any PRN oxycodone. moving his bowels. Able to ambulate and perform ADLs. Good appetite.  [FreeTextEntry1] : supportive care

## 2021-02-19 NOTE — ASSESSMENT
[Supportive] : Goals of care discussed with patient: Supportive [Palliative Care Plan] : not applicable at this time

## 2021-02-22 ENCOUNTER — APPOINTMENT (OUTPATIENT)
Dept: HEMATOLOGY ONCOLOGY | Facility: CLINIC | Age: 76
End: 2021-02-22
Payer: MEDICARE

## 2021-02-22 PROCEDURE — 99442: CPT

## 2021-03-17 ENCOUNTER — OUTPATIENT (OUTPATIENT)
Dept: OUTPATIENT SERVICES | Facility: HOSPITAL | Age: 76
LOS: 1 days | Discharge: HOME | End: 2021-03-17

## 2021-03-17 DIAGNOSIS — C19 MALIGNANT NEOPLASM OF RECTOSIGMOID JUNCTION: ICD-10-CM

## 2021-03-17 DIAGNOSIS — Z94.5 SKIN TRANSPLANT STATUS: Chronic | ICD-10-CM

## 2021-03-18 NOTE — HISTORY OF PRESENT ILLNESS
[Home] : at home, [unfilled] , at the time of the visit. [Medical Office: ([facility])___] : at the medical office located in  [Verbal consent obtained from patient] : the patient, [unfilled] [Disease: _____________________] : Disease: [unfilled] [T: ___] : T[unfilled] [N: ___] : N[unfilled] [M: ___] : M[unfilled] [AJCC Stage: ____] : AJCC Stage: [unfilled] [de-identified] : 75 M presents for management of metastatic rectal cancer in the setting of ongoing treatment for osteomyelitis. \par \par Hira presented in August 2020 with lower back pain and perianal drainage x 4 weeks. Initial imaging and evaluation demonstrated a complex perianal fistula. Underwent surgical evaluation, rectal EUA on 8/7/20 and noted to have a rectal mass. Bx c/w adenocarcinoma. Colonoscopy on 8/12 noted partially obstructed rectal mass. CT A/P noted erosive changes in L4-5 concerning for osteomyelitis and CT chest c/w multiple b/l pulmonary nodules likely metastatic disease. On 8/10/20 underwent a FNA of lung nodule by IR. Bx confirmed metastatic adenocarcinoma from the rectum. MRI Pelvis on 8/17 showed a complex perianal fistulous disease with multiple tracts to the skin, difficult to evaluate for underlying mass. On 8/14/20 due to concern for obstructing mass, he underwent a laparoscopic diverting loop sigmoid colostomy. In terms of the osteomyelitis, although cultures negative, plan to continue 6 week course of Vancomycin/Ertapenem via PICC line. Had a total 30 lb weight loss since June. \par \par Referred to medical oncology for further management. \par \par Pt refused systemic chemotherapy and declined hospice. \par  [de-identified] : adenocarcinoma  [de-identified] : CEA 9.3 [de-identified] : pain is manageable, takes Oxycodone 2-3 times per day. Taking Methadone BID. Moving bowels. Appetite is stable. \par Needs refill oxycodone. + nausea, no vomiting. \par

## 2021-03-22 ENCOUNTER — APPOINTMENT (OUTPATIENT)
Dept: HEMATOLOGY ONCOLOGY | Facility: CLINIC | Age: 76
End: 2021-03-22
Payer: MEDICARE

## 2021-03-22 PROCEDURE — 99442: CPT

## 2021-03-25 RX ORDER — ONDANSETRON 4 MG/1
4 TABLET ORAL
Qty: 30 | Refills: 2 | Status: ACTIVE | COMMUNITY
Start: 2021-03-25 | End: 1900-01-01

## 2021-03-25 RX ORDER — ONDANSETRON 4 MG/1
4 TABLET ORAL
Qty: 45 | Refills: 1 | Status: DISCONTINUED | COMMUNITY
Start: 2021-03-22 | End: 2021-03-25

## 2021-04-08 ENCOUNTER — APPOINTMENT (OUTPATIENT)
Dept: HEMATOLOGY ONCOLOGY | Facility: CLINIC | Age: 76
End: 2021-04-08
Payer: MEDICARE

## 2021-04-08 DIAGNOSIS — C20 MALIGNANT NEOPLASM OF RECTUM: ICD-10-CM

## 2021-04-08 DIAGNOSIS — R11.0 NAUSEA: ICD-10-CM

## 2021-04-08 PROCEDURE — 99441: CPT

## 2021-04-11 NOTE — HISTORY OF PRESENT ILLNESS
[Home] : at home, [unfilled] , at the time of the visit. [Medical Office: ([facility])___] : at the medical office located in  [Verbal consent obtained from patient] : the patient, [unfilled] [Disease: _____________________] : Disease: [unfilled] [T: ___] : T[unfilled] [N: ___] : N[unfilled] [M: ___] : M[unfilled] [AJCC Stage: ____] : AJCC Stage: [unfilled] [de-identified] : 75 M presents for management of metastatic rectal cancer in the setting of ongoing treatment for osteomyelitis. \par \par Hira presented in August 2020 with lower back pain and perianal drainage x 4 weeks. Initial imaging and evaluation demonstrated a complex perianal fistula. Underwent surgical evaluation, rectal EUA on 8/7/20 and noted to have a rectal mass. Bx c/w adenocarcinoma. Colonoscopy on 8/12 noted partially obstructed rectal mass. CT A/P noted erosive changes in L4-5 concerning for osteomyelitis and CT chest c/w multiple b/l pulmonary nodules likely metastatic disease. On 8/10/20 underwent a FNA of lung nodule by IR. Bx confirmed metastatic adenocarcinoma from the rectum. MRI Pelvis on 8/17 showed a complex perianal fistulous disease with multiple tracts to the skin, difficult to evaluate for underlying mass. On 8/14/20 due to concern for obstructing mass, he underwent a laparoscopic diverting loop sigmoid colostomy. In terms of the osteomyelitis, although cultures negative, plan to continue 6 week course of Vancomycin/Ertapenem via PICC line. Had a total 30 lb weight loss since June. \par \par Referred to medical oncology for further management. \par \par Pt refused systemic chemotherapy and declined hospice. \par \par  [de-identified] : adenocarcinoma  [de-identified] : CEA 9.3 [FreeTextEntry1] : supportive care [de-identified] : + nausea which is not relieved by Reglan. Not taking Methadone BID consistently, misses doses. Takes Oxycodone twice per day. Pain levels overall have been rising. \par Occasional confusion. Able to care for himself, perform all ADLs. Appetite is not very good.

## 2021-04-11 NOTE — REVIEW OF SYSTEMS
[Fatigue] : fatigue [Joint Pain] : joint pain [Joint Stiffness] : joint stiffness [Negative] : Allergic/Immunologic [Abdominal Pain] : no abdominal pain

## 2021-04-25 PROBLEM — C20: Status: ACTIVE | Noted: 2020-08-24

## 2021-04-25 PROBLEM — R11.0 NAUSEA: Status: ACTIVE | Noted: 2021-03-22

## 2021-04-25 NOTE — HISTORY OF PRESENT ILLNESS
[Home] : at home, [unfilled] , at the time of the visit. [Medical Office: ([facility])___] : at the medical office located in  [Verbal consent obtained from patient] : the patient, [unfilled] [Disease: _____________________] : Disease: [unfilled] [T: ___] : T[unfilled] [N: ___] : N[unfilled] [M: ___] : M[unfilled] [AJCC Stage: ____] : AJCC Stage: [unfilled] [de-identified] : 75 M presents for management of metastatic rectal cancer in the setting of ongoing treatment for osteomyelitis. \par \par iHra presented in August 2020 with lower back pain and perianal drainage x 4 weeks. Initial imaging and evaluation demonstrated a complex perianal fistula. Underwent surgical evaluation, rectal EUA on 8/7/20 and noted to have a rectal mass. Bx c/w adenocarcinoma. Colonoscopy on 8/12 noted partially obstructed rectal mass. CT A/P noted erosive changes in L4-5 concerning for osteomyelitis and CT chest c/w multiple b/l pulmonary nodules likely metastatic disease. On 8/10/20 underwent a FNA of lung nodule by IR. Bx confirmed metastatic adenocarcinoma from the rectum. MRI Pelvis on 8/17 showed a complex perianal fistulous disease with multiple tracts to the skin, difficult to evaluate for underlying mass. On 8/14/20 due to concern for obstructing mass, he underwent a laparoscopic diverting loop sigmoid colostomy. In terms of the osteomyelitis, although cultures negative, plan to continue 6 week course of Vancomycin/Ertapenem via PICC line. Had a total 30 lb weight loss since June. \par \par Referred to medical oncology for further management. \par \par Pt refused systemic chemotherapy and declined hospice. \par \par  [de-identified] : adenocarcinoma  [de-identified] : CEA 9.3 [de-identified] : pain is controlled with methadone and oxycodone (2-3 per day). Appetite is fair, weight is stable. remains independent in ADLs. \par Nausea is controlled.  [FreeTextEntry1] : supportive care

## 2021-05-13 ENCOUNTER — NON-APPOINTMENT (OUTPATIENT)
Age: 76
End: 2021-05-13

## 2021-05-13 RX ORDER — METOCLOPRAMIDE 10 MG/1
10 TABLET ORAL
Qty: 30 | Refills: 3 | Status: COMPLETED | COMMUNITY
Start: 2020-08-24 | End: 2021-05-13

## 2021-05-13 RX ORDER — PROCHLORPERAZINE MALEATE 10 MG/1
10 TABLET ORAL EVERY 6 HOURS
Qty: 60 | Refills: 1 | Status: ACTIVE | COMMUNITY
Start: 2021-05-13 | End: 1900-01-01

## 2021-06-03 RX ORDER — OXYCODONE 10 MG/1
10 TABLET ORAL
Qty: 90 | Refills: 0 | Status: ACTIVE | COMMUNITY
Start: 2020-08-24 | End: 1900-01-01

## 2021-06-09 RX ORDER — METHADONE HYDROCHLORIDE 5 MG/1
5 TABLET ORAL
Qty: 120 | Refills: 0 | Status: ACTIVE | COMMUNITY
Start: 2021-02-11 | End: 1900-01-01

## 2021-06-10 ENCOUNTER — INPATIENT (INPATIENT)
Facility: HOSPITAL | Age: 76
LOS: 4 days | Discharge: HOSPICE | End: 2021-06-15
Attending: INTERNAL MEDICINE | Admitting: INTERNAL MEDICINE
Payer: MEDICARE

## 2021-06-10 VITALS
DIASTOLIC BLOOD PRESSURE: 91 MMHG | TEMPERATURE: 98 F | RESPIRATION RATE: 18 BRPM | HEIGHT: 70 IN | OXYGEN SATURATION: 100 % | HEART RATE: 105 BPM | SYSTOLIC BLOOD PRESSURE: 143 MMHG

## 2021-06-10 DIAGNOSIS — E87.5 HYPERKALEMIA: ICD-10-CM

## 2021-06-10 DIAGNOSIS — Z29.9 ENCOUNTER FOR PROPHYLACTIC MEASURES, UNSPECIFIED: ICD-10-CM

## 2021-06-10 DIAGNOSIS — N19 UNSPECIFIED KIDNEY FAILURE: ICD-10-CM

## 2021-06-10 DIAGNOSIS — R82.81 PYURIA: ICD-10-CM

## 2021-06-10 DIAGNOSIS — E87.8 OTHER DISORDERS OF ELECTROLYTE AND FLUID BALANCE, NOT ELSEWHERE CLASSIFIED: ICD-10-CM

## 2021-06-10 DIAGNOSIS — N17.9 ACUTE KIDNEY FAILURE, UNSPECIFIED: ICD-10-CM

## 2021-06-10 DIAGNOSIS — R52 PAIN, UNSPECIFIED: ICD-10-CM

## 2021-06-10 DIAGNOSIS — Z51.5 ENCOUNTER FOR PALLIATIVE CARE: ICD-10-CM

## 2021-06-10 DIAGNOSIS — C20 MALIGNANT NEOPLASM OF RECTUM: Chronic | ICD-10-CM

## 2021-06-10 DIAGNOSIS — C20 MALIGNANT NEOPLASM OF RECTUM: ICD-10-CM

## 2021-06-10 DIAGNOSIS — Z93.3 COLOSTOMY STATUS: Chronic | ICD-10-CM

## 2021-06-10 DIAGNOSIS — Z94.5 SKIN TRANSPLANT STATUS: Chronic | ICD-10-CM

## 2021-06-10 LAB
ALBUMIN SERPL ELPH-MCNC: 3.1 G/DL — LOW (ref 3.3–5)
ALP SERPL-CCNC: 99 U/L — SIGNIFICANT CHANGE UP (ref 40–120)
ALT FLD-CCNC: 6 U/L — SIGNIFICANT CHANGE UP (ref 4–41)
ANION GAP SERPL CALC-SCNC: 20 MMOL/L — HIGH (ref 7–14)
ANION GAP SERPL CALC-SCNC: 23 MMOL/L — HIGH (ref 7–14)
ANION GAP SERPL CALC-SCNC: 28 MMOL/L — HIGH (ref 7–14)
ANION GAP SERPL CALC-SCNC: 29 MMOL/L — HIGH (ref 7–14)
APPEARANCE UR: ABNORMAL
AST SERPL-CCNC: 15 U/L — SIGNIFICANT CHANGE UP (ref 4–40)
BACTERIA # UR AUTO: ABNORMAL
BASOPHILS # BLD AUTO: 0.05 K/UL — SIGNIFICANT CHANGE UP (ref 0–0.2)
BASOPHILS NFR BLD AUTO: 0.4 % — SIGNIFICANT CHANGE UP (ref 0–2)
BILIRUB SERPL-MCNC: 0.3 MG/DL — SIGNIFICANT CHANGE UP (ref 0.2–1.2)
BILIRUB UR-MCNC: NEGATIVE — SIGNIFICANT CHANGE UP
BLOOD GAS VENOUS COMPREHENSIVE RESULT: SIGNIFICANT CHANGE UP
BUN SERPL-MCNC: 100 MG/DL — HIGH (ref 7–23)
BUN SERPL-MCNC: 102 MG/DL — HIGH (ref 7–23)
BUN SERPL-MCNC: 78 MG/DL — HIGH (ref 7–23)
BUN SERPL-MCNC: 98 MG/DL — HIGH (ref 7–23)
CALCIUM SERPL-MCNC: 10.2 MG/DL — SIGNIFICANT CHANGE UP (ref 8.4–10.5)
CALCIUM SERPL-MCNC: 10.5 MG/DL — SIGNIFICANT CHANGE UP (ref 8.4–10.5)
CALCIUM SERPL-MCNC: 9.6 MG/DL — SIGNIFICANT CHANGE UP (ref 8.4–10.5)
CALCIUM SERPL-MCNC: 9.9 MG/DL — SIGNIFICANT CHANGE UP (ref 8.4–10.5)
CHLORIDE SERPL-SCNC: 83 MMOL/L — LOW (ref 98–107)
CHLORIDE SERPL-SCNC: 84 MMOL/L — LOW (ref 98–107)
CHLORIDE SERPL-SCNC: 89 MMOL/L — LOW (ref 98–107)
CHLORIDE SERPL-SCNC: 95 MMOL/L — LOW (ref 98–107)
CHLORIDE UR-SCNC: 20 MMOL/L — SIGNIFICANT CHANGE UP
CO2 SERPL-SCNC: 16 MMOL/L — LOW (ref 22–31)
CO2 SERPL-SCNC: 17 MMOL/L — LOW (ref 22–31)
CO2 SERPL-SCNC: 17 MMOL/L — LOW (ref 22–31)
CO2 SERPL-SCNC: 20 MMOL/L — LOW (ref 22–31)
COLOR SPEC: ABNORMAL
CREAT SERPL-MCNC: 14.71 MG/DL — HIGH (ref 0.5–1.3)
CREAT SERPL-MCNC: 16.25 MG/DL — HIGH (ref 0.5–1.3)
CREAT SERPL-MCNC: 16.53 MG/DL — HIGH (ref 0.5–1.3)
CREAT SERPL-MCNC: 9.54 MG/DL — HIGH (ref 0.5–1.3)
DIALYSIS INSTRUMENT RESULT - HEPATITIS B SURFACE ANTIGEN: NEGATIVE — SIGNIFICANT CHANGE UP
DIFF PNL FLD: ABNORMAL
EOSINOPHIL # BLD AUTO: 0.02 K/UL — SIGNIFICANT CHANGE UP (ref 0–0.5)
EOSINOPHIL NFR BLD AUTO: 0.2 % — SIGNIFICANT CHANGE UP (ref 0–6)
GLUCOSE SERPL-MCNC: 80 MG/DL — SIGNIFICANT CHANGE UP (ref 70–99)
GLUCOSE SERPL-MCNC: 85 MG/DL — SIGNIFICANT CHANGE UP (ref 70–99)
GLUCOSE SERPL-MCNC: 91 MG/DL — SIGNIFICANT CHANGE UP (ref 70–99)
GLUCOSE SERPL-MCNC: 96 MG/DL — SIGNIFICANT CHANGE UP (ref 70–99)
GLUCOSE UR QL: NEGATIVE — SIGNIFICANT CHANGE UP
HCT VFR BLD CALC: 43.7 % — SIGNIFICANT CHANGE UP (ref 39–50)
HCT VFR BLD CALC: 49 % — SIGNIFICANT CHANGE UP (ref 39–50)
HGB BLD-MCNC: 14.9 G/DL — SIGNIFICANT CHANGE UP (ref 13–17)
HGB BLD-MCNC: 16.5 G/DL — SIGNIFICANT CHANGE UP (ref 13–17)
IANC: 9.28 K/UL — HIGH (ref 1.5–8.5)
IMM GRANULOCYTES NFR BLD AUTO: 0.5 % — SIGNIFICANT CHANGE UP (ref 0–1.5)
KETONES UR-MCNC: NEGATIVE — SIGNIFICANT CHANGE UP
LEUKOCYTE ESTERASE UR-ACNC: ABNORMAL
LYMPHOCYTES # BLD AUTO: 0.58 K/UL — LOW (ref 1–3.3)
LYMPHOCYTES # BLD AUTO: 5.2 % — LOW (ref 13–44)
MAGNESIUM SERPL-MCNC: 2 MG/DL — SIGNIFICANT CHANGE UP (ref 1.6–2.6)
MCHC RBC-ENTMCNC: 31 PG — SIGNIFICANT CHANGE UP (ref 27–34)
MCHC RBC-ENTMCNC: 31.4 PG — SIGNIFICANT CHANGE UP (ref 27–34)
MCHC RBC-ENTMCNC: 33.7 GM/DL — SIGNIFICANT CHANGE UP (ref 32–36)
MCHC RBC-ENTMCNC: 34.1 GM/DL — SIGNIFICANT CHANGE UP (ref 32–36)
MCV RBC AUTO: 92 FL — SIGNIFICANT CHANGE UP (ref 80–100)
MCV RBC AUTO: 92.1 FL — SIGNIFICANT CHANGE UP (ref 80–100)
MONOCYTES # BLD AUTO: 1.24 K/UL — HIGH (ref 0–0.9)
MONOCYTES NFR BLD AUTO: 11 % — SIGNIFICANT CHANGE UP (ref 2–14)
NEUTROPHILS # BLD AUTO: 9.28 K/UL — HIGH (ref 1.8–7.4)
NEUTROPHILS NFR BLD AUTO: 82.7 % — HIGH (ref 43–77)
NITRITE UR-MCNC: NEGATIVE — SIGNIFICANT CHANGE UP
NRBC # BLD: 0 /100 WBCS — SIGNIFICANT CHANGE UP
NRBC # BLD: 0 /100 WBCS — SIGNIFICANT CHANGE UP
NRBC # FLD: 0 K/UL — SIGNIFICANT CHANGE UP
NRBC # FLD: 0 K/UL — SIGNIFICANT CHANGE UP
OSMOLALITY UR: 345 MOSM/KG — SIGNIFICANT CHANGE UP (ref 50–1200)
PH UR: 6 — SIGNIFICANT CHANGE UP (ref 5–8)
PHOSPHATE SERPL-MCNC: 6.4 MG/DL — HIGH (ref 2.5–4.5)
PLATELET # BLD AUTO: 213 K/UL — SIGNIFICANT CHANGE UP (ref 150–400)
PLATELET # BLD AUTO: 302 K/UL — SIGNIFICANT CHANGE UP (ref 150–400)
POTASSIUM SERPL-MCNC: 4.6 MMOL/L — SIGNIFICANT CHANGE UP (ref 3.5–5.3)
POTASSIUM SERPL-MCNC: 5.2 MMOL/L — SIGNIFICANT CHANGE UP (ref 3.5–5.3)
POTASSIUM SERPL-MCNC: 6.7 MMOL/L — CRITICAL HIGH (ref 3.5–5.3)
POTASSIUM SERPL-MCNC: 6.8 MMOL/L — CRITICAL HIGH (ref 3.5–5.3)
POTASSIUM SERPL-SCNC: 4.6 MMOL/L — SIGNIFICANT CHANGE UP (ref 3.5–5.3)
POTASSIUM SERPL-SCNC: 5.2 MMOL/L — SIGNIFICANT CHANGE UP (ref 3.5–5.3)
POTASSIUM SERPL-SCNC: 6.7 MMOL/L — CRITICAL HIGH (ref 3.5–5.3)
POTASSIUM SERPL-SCNC: 6.8 MMOL/L — CRITICAL HIGH (ref 3.5–5.3)
POTASSIUM UR-SCNC: 47.7 MMOL/L — SIGNIFICANT CHANGE UP
PROT SERPL-MCNC: 7.7 G/DL — SIGNIFICANT CHANGE UP (ref 6–8.3)
PROT UR-MCNC: ABNORMAL
RBC # BLD: 4.75 M/UL — SIGNIFICANT CHANGE UP (ref 4.2–5.8)
RBC # BLD: 5.32 M/UL — SIGNIFICANT CHANGE UP (ref 4.2–5.8)
RBC # FLD: 14.5 % — SIGNIFICANT CHANGE UP (ref 10.3–14.5)
RBC # FLD: 14.5 % — SIGNIFICANT CHANGE UP (ref 10.3–14.5)
RBC CASTS # UR COMP ASSIST: >50 /HPF — SIGNIFICANT CHANGE UP (ref 0–4)
SARS-COV-2 RNA SPEC QL NAA+PROBE: SIGNIFICANT CHANGE UP
SODIUM SERPL-SCNC: 128 MMOL/L — LOW (ref 135–145)
SODIUM SERPL-SCNC: 129 MMOL/L — LOW (ref 135–145)
SODIUM SERPL-SCNC: 129 MMOL/L — LOW (ref 135–145)
SODIUM SERPL-SCNC: 135 MMOL/L — SIGNIFICANT CHANGE UP (ref 135–145)
SODIUM UR-SCNC: 34 MMOL/L — SIGNIFICANT CHANGE UP
SP GR SPEC: 1.01 — SIGNIFICANT CHANGE UP (ref 1.01–1.02)
UROBILINOGEN FLD QL: SIGNIFICANT CHANGE UP
WBC # BLD: 11.23 K/UL — HIGH (ref 3.8–10.5)
WBC # BLD: 7.79 K/UL — SIGNIFICANT CHANGE UP (ref 3.8–10.5)
WBC # FLD AUTO: 11.23 K/UL — HIGH (ref 3.8–10.5)
WBC # FLD AUTO: 7.79 K/UL — SIGNIFICANT CHANGE UP (ref 3.8–10.5)
WBC UR QL: >50 /HPF — SIGNIFICANT CHANGE UP (ref 0–5)

## 2021-06-10 PROCEDURE — 76770 US EXAM ABDO BACK WALL COMP: CPT | Mod: 26

## 2021-06-10 PROCEDURE — 99222 1ST HOSP IP/OBS MODERATE 55: CPT

## 2021-06-10 PROCEDURE — 99223 1ST HOSP IP/OBS HIGH 75: CPT

## 2021-06-10 PROCEDURE — 99223 1ST HOSP IP/OBS HIGH 75: CPT | Mod: GC

## 2021-06-10 PROCEDURE — 74176 CT ABD & PELVIS W/O CONTRAST: CPT | Mod: 26

## 2021-06-10 PROCEDURE — 93010 ELECTROCARDIOGRAM REPORT: CPT

## 2021-06-10 PROCEDURE — 99285 EMERGENCY DEPT VISIT HI MDM: CPT | Mod: 25

## 2021-06-10 RX ORDER — ACETAMINOPHEN 500 MG
650 TABLET ORAL EVERY 4 HOURS
Refills: 0 | Status: DISCONTINUED | OUTPATIENT
Start: 2021-06-10 | End: 2021-06-15

## 2021-06-10 RX ORDER — METHADONE HYDROCHLORIDE 40 MG/1
7.5 TABLET ORAL THREE TIMES A DAY
Refills: 0 | Status: DISCONTINUED | OUTPATIENT
Start: 2021-06-10 | End: 2021-06-10

## 2021-06-10 RX ORDER — LACTULOSE 10 G/15ML
10 SOLUTION ORAL
Refills: 0 | Status: DISCONTINUED | OUTPATIENT
Start: 2021-06-10 | End: 2021-06-11

## 2021-06-10 RX ORDER — SENNA PLUS 8.6 MG/1
2 TABLET ORAL AT BEDTIME
Refills: 0 | Status: DISCONTINUED | OUTPATIENT
Start: 2021-06-10 | End: 2021-06-15

## 2021-06-10 RX ORDER — HEPARIN SODIUM 5000 [USP'U]/ML
5000 INJECTION INTRAVENOUS; SUBCUTANEOUS EVERY 8 HOURS
Refills: 0 | Status: DISCONTINUED | OUTPATIENT
Start: 2021-06-10 | End: 2021-06-13

## 2021-06-10 RX ORDER — SODIUM CHLORIDE 9 MG/ML
1000 INJECTION, SOLUTION INTRAVENOUS
Refills: 0 | Status: DISCONTINUED | OUTPATIENT
Start: 2021-06-10 | End: 2021-06-11

## 2021-06-10 RX ORDER — METHADONE HYDROCHLORIDE 40 MG/1
10 TABLET ORAL AT BEDTIME
Refills: 0 | Status: DISCONTINUED | OUTPATIENT
Start: 2021-06-10 | End: 2021-06-10

## 2021-06-10 RX ORDER — HYDROMORPHONE HYDROCHLORIDE 2 MG/ML
0.5 INJECTION INTRAMUSCULAR; INTRAVENOUS; SUBCUTANEOUS ONCE
Refills: 0 | Status: DISCONTINUED | OUTPATIENT
Start: 2021-06-10 | End: 2021-06-10

## 2021-06-10 RX ORDER — CALCIUM GLUCONATE 100 MG/ML
1 VIAL (ML) INTRAVENOUS ONCE
Refills: 0 | Status: COMPLETED | OUTPATIENT
Start: 2021-06-10 | End: 2021-06-10

## 2021-06-10 RX ORDER — METHADONE HYDROCHLORIDE 40 MG/1
10 TABLET ORAL AT BEDTIME
Refills: 0 | Status: DISCONTINUED | OUTPATIENT
Start: 2021-06-10 | End: 2021-06-15

## 2021-06-10 RX ORDER — DEXTROSE 50 % IN WATER 50 %
50 SYRINGE (ML) INTRAVENOUS ONCE
Refills: 0 | Status: COMPLETED | OUTPATIENT
Start: 2021-06-10 | End: 2021-06-10

## 2021-06-10 RX ORDER — METHADONE HYDROCHLORIDE 40 MG/1
5 TABLET ORAL
Refills: 0 | Status: DISCONTINUED | OUTPATIENT
Start: 2021-06-10 | End: 2021-06-10

## 2021-06-10 RX ORDER — CEFTRIAXONE 500 MG/1
1000 INJECTION, POWDER, FOR SOLUTION INTRAMUSCULAR; INTRAVENOUS ONCE
Refills: 0 | Status: COMPLETED | OUTPATIENT
Start: 2021-06-10 | End: 2021-06-10

## 2021-06-10 RX ORDER — SODIUM BICARBONATE 1 MEQ/ML
0.25 SYRINGE (ML) INTRAVENOUS
Qty: 150 | Refills: 0 | Status: DISCONTINUED | OUTPATIENT
Start: 2021-06-10 | End: 2021-06-10

## 2021-06-10 RX ORDER — ONDANSETRON 8 MG/1
4 TABLET, FILM COATED ORAL ONCE
Refills: 0 | Status: COMPLETED | OUTPATIENT
Start: 2021-06-10 | End: 2021-06-10

## 2021-06-10 RX ORDER — CEFTRIAXONE 500 MG/1
1000 INJECTION, POWDER, FOR SOLUTION INTRAMUSCULAR; INTRAVENOUS EVERY 24 HOURS
Refills: 0 | Status: DISCONTINUED | OUTPATIENT
Start: 2021-06-11 | End: 2021-06-12

## 2021-06-10 RX ORDER — LORATADINE 10 MG/1
1 TABLET ORAL
Qty: 0 | Refills: 0 | DISCHARGE

## 2021-06-10 RX ORDER — HYDROMORPHONE HYDROCHLORIDE 2 MG/ML
0.5 INJECTION INTRAMUSCULAR; INTRAVENOUS; SUBCUTANEOUS
Refills: 0 | Status: DISCONTINUED | OUTPATIENT
Start: 2021-06-10 | End: 2021-06-14

## 2021-06-10 RX ORDER — METHADONE HYDROCHLORIDE 40 MG/1
5 TABLET ORAL
Refills: 0 | Status: DISCONTINUED | OUTPATIENT
Start: 2021-06-10 | End: 2021-06-15

## 2021-06-10 RX ORDER — SODIUM ZIRCONIUM CYCLOSILICATE 10 G/10G
10 POWDER, FOR SUSPENSION ORAL ONCE
Refills: 0 | Status: DISCONTINUED | OUTPATIENT
Start: 2021-06-10 | End: 2021-06-15

## 2021-06-10 RX ORDER — SODIUM CHLORIDE 9 MG/ML
1000 INJECTION INTRAMUSCULAR; INTRAVENOUS; SUBCUTANEOUS ONCE
Refills: 0 | Status: COMPLETED | OUTPATIENT
Start: 2021-06-10 | End: 2021-06-10

## 2021-06-10 RX ORDER — INSULIN HUMAN 100 [IU]/ML
10 INJECTION, SOLUTION SUBCUTANEOUS ONCE
Refills: 0 | Status: COMPLETED | OUTPATIENT
Start: 2021-06-10 | End: 2021-06-10

## 2021-06-10 RX ORDER — POLYETHYLENE GLYCOL 3350 17 G/17G
17 POWDER, FOR SOLUTION ORAL
Refills: 0 | Status: DISCONTINUED | OUTPATIENT
Start: 2021-06-10 | End: 2021-06-15

## 2021-06-10 RX ADMIN — Medication 50 MILLILITER(S): at 06:20

## 2021-06-10 RX ADMIN — HYDROMORPHONE HYDROCHLORIDE 0.5 MILLIGRAM(S): 2 INJECTION INTRAMUSCULAR; INTRAVENOUS; SUBCUTANEOUS at 06:20

## 2021-06-10 RX ADMIN — HYDROMORPHONE HYDROCHLORIDE 0.5 MILLIGRAM(S): 2 INJECTION INTRAMUSCULAR; INTRAVENOUS; SUBCUTANEOUS at 22:36

## 2021-06-10 RX ADMIN — ONDANSETRON 4 MILLIGRAM(S): 8 TABLET, FILM COATED ORAL at 17:17

## 2021-06-10 RX ADMIN — HYDROMORPHONE HYDROCHLORIDE 0.5 MILLIGRAM(S): 2 INJECTION INTRAMUSCULAR; INTRAVENOUS; SUBCUTANEOUS at 22:21

## 2021-06-10 RX ADMIN — ONDANSETRON 4 MILLIGRAM(S): 8 TABLET, FILM COATED ORAL at 06:20

## 2021-06-10 RX ADMIN — POLYETHYLENE GLYCOL 3350 17 GRAM(S): 17 POWDER, FOR SOLUTION ORAL at 18:52

## 2021-06-10 RX ADMIN — HYDROMORPHONE HYDROCHLORIDE 0.5 MILLIGRAM(S): 2 INJECTION INTRAMUSCULAR; INTRAVENOUS; SUBCUTANEOUS at 06:50

## 2021-06-10 RX ADMIN — Medication 100 GRAM(S): at 08:01

## 2021-06-10 RX ADMIN — METHADONE HYDROCHLORIDE 10 MILLIGRAM(S): 40 TABLET ORAL at 22:21

## 2021-06-10 RX ADMIN — HEPARIN SODIUM 5000 UNIT(S): 5000 INJECTION INTRAVENOUS; SUBCUTANEOUS at 22:21

## 2021-06-10 RX ADMIN — HYDROMORPHONE HYDROCHLORIDE 0.5 MILLIGRAM(S): 2 INJECTION INTRAMUSCULAR; INTRAVENOUS; SUBCUTANEOUS at 03:45

## 2021-06-10 RX ADMIN — CEFTRIAXONE 100 MILLIGRAM(S): 500 INJECTION, POWDER, FOR SOLUTION INTRAMUSCULAR; INTRAVENOUS at 07:32

## 2021-06-10 RX ADMIN — HYDROMORPHONE HYDROCHLORIDE 0.5 MILLIGRAM(S): 2 INJECTION INTRAMUSCULAR; INTRAVENOUS; SUBCUTANEOUS at 18:58

## 2021-06-10 RX ADMIN — HYDROMORPHONE HYDROCHLORIDE 0.5 MILLIGRAM(S): 2 INJECTION INTRAMUSCULAR; INTRAVENOUS; SUBCUTANEOUS at 19:13

## 2021-06-10 RX ADMIN — ONDANSETRON 4 MILLIGRAM(S): 8 TABLET, FILM COATED ORAL at 03:45

## 2021-06-10 RX ADMIN — SODIUM CHLORIDE 1000 MILLILITER(S): 9 INJECTION INTRAMUSCULAR; INTRAVENOUS; SUBCUTANEOUS at 07:32

## 2021-06-10 RX ADMIN — SENNA PLUS 2 TABLET(S): 8.6 TABLET ORAL at 22:21

## 2021-06-10 RX ADMIN — INSULIN HUMAN 10 UNIT(S): 100 INJECTION, SOLUTION SUBCUTANEOUS at 06:20

## 2021-06-10 RX ADMIN — HEPARIN SODIUM 5000 UNIT(S): 5000 INJECTION INTRAVENOUS; SUBCUTANEOUS at 15:42

## 2021-06-10 RX ADMIN — SODIUM CHLORIDE 75 MILLILITER(S): 9 INJECTION, SOLUTION INTRAVENOUS at 10:47

## 2021-06-10 RX ADMIN — HYDROMORPHONE HYDROCHLORIDE 0.5 MILLIGRAM(S): 2 INJECTION INTRAMUSCULAR; INTRAVENOUS; SUBCUTANEOUS at 04:15

## 2021-06-10 NOTE — ED PROVIDER NOTE - ATTENDING CONTRIBUTION TO CARE
MD Burgess:  I performed a face to face bedside interview with patient regarding history of present illness, review of symptoms and past medical history. I completed an independent physical exam(documented below).  I have discussed patient's plan of care with resident.   I agree with note as stated above, having amended the EMR as needed to reflect my findings. I have discussed the assessment and plan of care.  This includes during the time I functioned as the attending physician for this patient.  PE:  Gen: Alert, uncomfortable appearing  Head: NC, AT,  EOMI, normal lids/conjunctiva  ENT:  normal hearing, patent oropharynx without erythema/exudate  Neck: +supple, no tenderness/meningismus/JVD, +Trachea midline  Chest: no chest wall tenderness, equal chest rise  Pulm: Bilateral BS, normal resp effort, no wheeze/stridor/retractions  CV: RRR, no M/R/G, +dist pulses  Abd: +BS, soft, ND, diffuse ttp,no rebound  Rectal: deferred  Mskel: no edema/erythema/cyanosis  Skin: no rash  Neuro: AAOx3,  MDM:   75yo M w/ pmh as above c/o abd pain x 3wks, not relieved w/ home methadone/oxycodone. Denies f/c/vomiting/dark or bloody stools. On ROS, also c/o dribbling urine and retention x few hours. Labs, meds, CT.

## 2021-06-10 NOTE — CONSULT NOTE ADULT - PROBLEM SELECTOR RECOMMENDATION 3
no further dmt  spoke with pt's primary onc  agree with plan of no HD and no further dmt  would rec hospice care  wife in agreement

## 2021-06-10 NOTE — ED PROVIDER NOTE - NS ED ROS FT
ROS:  GENERAL: No fever, no chills  EYES: no change in vision  HEENT: no trouble swallowing, no trouble speaking  CARDIAC: no chest pain  PULMONARY: no cough, no shortness of breath  GI: +abd pain.  no nausea, no vomiting, no diarrhea, no constipation  : No dysuria, no frequency, no change in appearance, or odor of urine  SKIN: no rashes  NEURO: no headache, no weakness  MSK: No joint pain    Galileo Batres PGY3

## 2021-06-10 NOTE — ED PROVIDER NOTE - CARE PLAN
Principal Discharge DX:	Pyuria  Secondary Diagnosis:	Hyperkalemia  Secondary Diagnosis:	ALPHONSE (acute kidney injury)

## 2021-06-10 NOTE — H&P ADULT - NSHPSOCIALHISTORY_GEN_ALL_CORE
Patient lives with wife. Ambulates without a device. Denies past or current tobacco, alcohol or drug use.

## 2021-06-10 NOTE — H&P ADULT - PROBLEM SELECTOR PLAN 1
- Nephrology consulted, reccs appreciated; renal failure in the setting of urinary retention/ obstructive uropathy  - S/P garcia catheter placement in ER with 300cc of turbid urine drained, continue garcia for 24 hrs   - Monitor - Nephrology consulted, reccs appreciated; renal failure in the setting of urinary retention/ obstructive uropathy  - S/P garcia catheter placement in ER with 300cc of turbid urine drained, continue garcia for 24 hrs   - Monitor BMP  - Renal US pending to evaluate hydroureteronephrosis Admit to medicine  Patient with ~1 month of urinary hesitancy, dysuria, and decreased urine output as well as abdominal pain now noted to have a Cr of ~16 with multiple electrolyte abnormalities.   - Nephrology consulted, recs appreciated; renal failure in the setting of urinary retention/ obstructive uropathy  - S/P garcia catheter placement in ER with 300cc of turbid urine drained, continue garcia for 24 hrs   - S/P 1L NS in ED, ordered for D5W c Bicarb 150 mEQ @ 75cc/hr per nephro recs  - Monitor BMP - next @ ~11AM  - Renal US pending to evaluate hydroureteronephrosis  - Will need Goals of care discussion to determine if HD an option if no significant improvement in renal function - at this time patient unwilling to participate in conversation.

## 2021-06-10 NOTE — CONSULT NOTE ADULT - ASSESSMENT
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 now in acute renal failure.  asked to see for pain management and goc

## 2021-06-10 NOTE — H&P ADULT - NSHPLABSRESULTS_GEN_ALL_CORE
16.5   11.23 )-----------( 302      ( 10 Kwasi 2021 04:16 )             49.0     LIVER FUNCTIONS - ( 10 Kwasi 2021 04:16 )  Alb: 3.1 g/dL / Pro: 7.7 g/dL / ALK PHOS: 99 U/L / ALT: 6 U/L / AST: 15 U/L / GGT: x           06-10    129<L>  |  89<L>  |  98<H>  ----------------------------<  80  5.2   |  17<L>  |  14.71<H>    Ca    9.6      10 Kwasi 2021 07:38    TPro  7.7  /  Alb  3.1<L>  /  TBili  0.3  /  DBili  x   /  AST  15  /  ALT  6   /  AlkPhos  99  06-10    Urinalysis Basic - ( 10 Kwasi 2021 07:38 )    Color: Light Orange / Appearance: Turbid / S.012 / pH: x  Gluc: x / Ketone: Negative  / Bili: Negative / Urobili: <2 mg/dL   Blood: x / Protein: >600 mg/dL / Nitrite: Negative   Leuk Esterase: Large / RBC: >50 /HPF / WBC >50 /HPF   Sq Epi: x / Non Sq Epi: x / Bacteria: Moderate    EKG: NSR 96bpm LAD QTc 469ms    CT A&P no cont:  IMPRESSION:  Interval progression in degree of intrapulmonary and intra-abdominal primary and metastatic disease from known colon cancer.  Leos catheter balloon inflated in the prostatic urethra, needs readjustment. Underdistended bladder with air secondary to instrumentation.  No bowel obstruction. Redemonstration of masslike thickening at the level of the recto anal region consistent with perirectal/perianal fistulous disease, cannot exclude underlying masses from known colon cancer.  Mild left hydroureteronephrosis with mild left perinephric stranding without obstructing calculus. No renal calculi.  Enlarged seminal vesicles, may be inflamed. Recommend clinical correlation.  Lateral wall thickening suggestive of cystitis. Intraluminal bladder air, likely iatrogenic.

## 2021-06-10 NOTE — CONSULT NOTE ADULT - SUBJECTIVE AND OBJECTIVE BOX
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 Patient describes it as intermittent, sharp, 8/10 localized to the central abdomen. Patient has been taking his home methadone and oxycodone which brings pain to 5/10. Walking makes the pain worse. Patient also admits to insomnia, anorexia, fatigue, nausea, occasional episodes of anxiety and depression, and urinary complaints including decreased urinary frequency, dribbling and hesitancy for at least 3-4 weeks. +Decreased appetite and poor PO intake though thinks he is drinking adequate water. Denies fevers, chills, lightheadedness, dizziness, HA, cough, vision changes, chest pain, SOB, RODGERS, vomiting, constipation, weight loss or weight gain.  Leos catheter placed in ER with 300cc of turbid urine drained.  (10 Kwasi 2021 09:52)    Metastatic Rectal Cancer History   - 8/2020 lower back pain and perianal drainage x 4 weeks. Imaging revealed complex perianal fistual-> surgery evaluation -> rectal EUA 8/7/2020 showing rectal mass bx c/w adenocarcinoma   - 8/12/2020 colonoscopy with partially obstructed rectal mass   - CT A/P erosive changes L4-L5 c/f osteomyelitis and CT Chest c/w multiple b/l pulmonary nodules likely metastatic disease   - 8/10/2020 FNA lung nodule biopsy confirmed metastatic rectal adenocarcinoma   - 8/17/20 MRI pelvis complex perianal fistulous disease with multiple tracts to the skin   - 8/14/20 s/p laprascopic diverting loop sigmoid colostomy   - treated for 6 weeks for osteomyelitis   - Refused systemic chemotherapy and declined hospice   - Methadone and oxycodone for pain control     PAST MEDICAL & SURGICAL HISTORY:  Seasonal Allergies    Basal Cell Cancer; Right side distal nose    H/O: Obesity    Rectal adenocarcinoma    Basal Cell Carcinoma of Anterior Chest; s/p MOHS surgery 8/10 proximal to suprasternal notch    H/O skin graft    S/P colostomy        Allergies    dust (Rhinitis; Headache)  No Known Drug Allergies    Intolerances        MEDICATIONS  (STANDING):  dextrose 5% 1000 milliLiter(s) (75 mL/Hr) IV Continuous <Continuous>  heparin   Injectable 5000 Unit(s) SubCutaneous every 8 hours  lactulose Syrup 10 Gram(s) Oral four times a day  methadone    Tablet 5 milliGRAM(s) Oral <User Schedule>  methadone    Tablet 10 milliGRAM(s) Oral at bedtime  polyethylene glycol 3350 17 Gram(s) Oral two times a day  senna 2 Tablet(s) Oral at bedtime  sodium zirconium cyclosilicate 10 Gram(s) Oral once    MEDICATIONS  (PRN):  acetaminophen   Tablet .. 650 milliGRAM(s) Oral every 4 hours PRN Mild Pain (1 - 3)      FAMILY HISTORY:  No pertinent family history in first degree relatives        SOCIAL HISTORY: No EtOH, no tobacco    REVIEW OF SYSTEMS:    CONSTITUTIONAL: No weakness, fevers or chills  EYES/ENT: No visual changes;  No vertigo or throat pain   NECK: No pain or stiffness  RESPIRATORY: No cough, wheezing, hemoptysis; No shortness of breath  CARDIOVASCULAR: No chest pain or palpitations  GASTROINTESTINAL: No abdominal or epigastric pain. No nausea, vomiting, or hematemesis; No diarrhea or constipation. No melena or hematochezia.  GENITOURINARY: No dysuria, frequency or hematuria  NEUROLOGICAL: No numbness or weakness  SKIN: No itching, burning, rashes, or lesions   All other review of systems is negative unless indicated above.    Height (cm): 175.3 (06-10 @ 09:52)  Weight (kg): 77.1 (06-10 @ 09:52)  BMI (kg/m2): 25.1 (06-10 @ 09:52)  BSA (m2): 1.93 (06-10 @ 09:52)    T(F): 98 (06-10-21 @ 05:11), Max: 98 (06-10-21 @ 05:11)  HR: 78 (06-10-21 @ 10:50)  BP: 101/63 (06-10-21 @ 10:50)  RR: 11 (06-10-21 @ 10:50)  SpO2: 96% (06-10-21 @ 10:50)  Wt(kg): --    GENERAL: NAD, well-developed  HEAD:  Atraumatic, Normocephalic  EYES: EOMI, PERRLA, conjunctiva and sclera clear  NECK: Supple, No JVD  CHEST/LUNG: Clear to auscultation bilaterally; No wheeze  HEART: Regular rate and rhythm; No murmurs, rubs, or gallops  ABDOMEN: Soft, Nontender, Nondistended; Bowel sounds present  EXTREMITIES:  2+ Peripheral Pulses, No clubbing, cyanosis, or edema  NEUROLOGY: non-focal  SKIN: No rashes or lesions                          16.5   11.23 )-----------( 302      ( 10 Kwasi 2021 04:16 )             49.0       06-10    129<L>  |  89<L>  |  98<H>  ----------------------------<  80  5.2   |  17<L>  |  14.71<H>    Ca    9.6      10 Kwasi 2021 07:38    TPro  7.7  /  Alb  3.1<L>  /  TBili  0.3  /  DBili  x   /  AST  15  /  ALT  6   /  AlkPhos  99  06-10

## 2021-06-10 NOTE — CONSULT NOTE ADULT - ATTENDING COMMENTS
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 Found to have obstructive uropathy and acute renal failure and progression of disease on imaging. He was diagnosed in 8/20 and had surgical bypass for obstruction but did not want any chemotherapy. His disease is progressing and he is now open to having hospice. His wife is supportive so would manage acute symptoms and then discharge home with hospice.
metastatic rectal cancer presents with obstructive uropathy renal failure, Hyperkalemia and acidosis   Garcia draining   CT done pending results   K 6.8 now 5.2  continue with garcia  start Bicarbonate gtt   avoid ACE/ARB/Contrasts/NSAIDs   repeat BMP in 4 hours   needs US of kidneys   will follow

## 2021-06-10 NOTE — ED ADULT NURSE REASSESSMENT NOTE - NS ED NURSE REASSESS COMMENT FT1
16 fr garcia placed using sterile technique. 1100cc of very cloudy urine noted. No complaints of chest pain, headache, nausea, dizziness, vomiting  SOB, fever, chills verbalized.

## 2021-06-10 NOTE — CONSULT NOTE ADULT - PROBLEM SELECTOR RECOMMENDATION 9
now with garcia catheter now with garcia catheter   spoke with wife extensively at bedside  she and her children do not want to pursue HD  they understand pt's prognosis is grave from underlying met cancer and do not want to prolong it with HD

## 2021-06-10 NOTE — CONSULT NOTE ADULT - PROBLEM SELECTOR RECOMMENDATION 9
Pt. with renal failure in the setting of urinary retention/obstructive uropathy. Exact duration of renal failure however unknown. Scr on admission elevated at 16.25 with prior normal SCr in September 2020. Pt. with history of metastatic rectal adenocarcinoma. Pt. s/p garcia catheter placement in ER with 300 cc of turbid urine drained. Pt. underwent CT abd and pevlis in ER with results currently pending. Pt. likely has ALPHONSE in the setting of acute urinary retention. Recommend UA and follow up on imaging to rule out hydronephrosis. Continue Garcia catheter for 24 hours. Agree with fluid bolus, however switch to bicarb gtt (150 mEQs of NaHCO2 in 1L D5W) at 75cc/hr as maintenance given hyperkalemia and acidosis. Monitor serum creatinine. Pt. is a poor candidate for long-term dialysis given metastatic cancer, however if hyperkalemia persists will need to establish GOC prior to considering RRT in the short term.

## 2021-06-10 NOTE — H&P ADULT - ATTENDING COMMENTS
Pt seen and examined at bedside on 6/10, comfortably laying in bed. Wife, Princess, at bedside.   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   #ARF: multifactorial, resolving after garcia insertion, seen by renal, c/w IVF as recommended. Monitor BMP q6-8   -UTI: c/w Ceftriaxone, f/u UCx   -Constipation 2/2 opioids: CT w/stool, not on bowel regimen at home, start miralax, senna and lactulose, monitor stool count     #Hyperkalemia 2/2 ARF: resolving s/p cocktail in ER, monitor BMP     #Pain control: c/w home methadone, palliative consulted to assist with regimen    D/w ACP, pt and wife

## 2021-06-10 NOTE — H&P ADULT - MUSCULOSKELETAL
details… detailed exam ROM intact/no joint swelling/no joint erythema/no calf tenderness/normal strength

## 2021-06-10 NOTE — H&P ADULT - NSICDXPASTMEDICALHX_GEN_ALL_CORE_FT
PAST MEDICAL HISTORY:  Basal Cell Cancer; Right side distal nose     H/O: Obesity     Rectal adenocarcinoma     Seasonal Allergies

## 2021-06-10 NOTE — ED PROVIDER NOTE - PHYSICAL EXAMINATION
Gen: AAOx3, non-toxic  Head: NCAT  HEENT: EOMI, oral mucosa moist, normal conjunctiva  Lung: CTAB, no respiratory distress, no wheezes/rhonchi/rales B/L, speaking in full sentences  CV: RRR, no murmurs, rubs or gallops  Abd: soft, mild diffuse TTP, no rebound or guarding   MSK: no visible deformities  Neuro: No focal sensory or motor deficits  Skin: Warm, well perfused, no rash  Psych: normal affect.     Galileo Batres PGY3

## 2021-06-10 NOTE — PATIENT PROFILE ADULT - SURGICAL SITE INCISION
[facility] Progress Note    Patient: Nessa Sumner Date: 2019   : 1935 Attending: Erick Robert MD   Admit Date: 2019 Room/Bed: /A         Chief Complaint: s/p fall     Interval History: this morning opens eyes to name. Per nurse, patient appropriately saying her name. Responds to all questions with \"ya\" to the writer. No commands. Spontaneous movement on all extremities, less movement on LUE. Remains on optiflow. EEG prelim with no seizures after 1030pm last night.      PHYSICAL EXAM:  Visit Vitals  /69   Pulse 85   Temp 98.8 °F (37.1 °C)   Resp 24   Ht 5' 2\" (1.575 m)   Wt 103.9 kg   SpO2 98%   BMI 41.90 kg/m²     General: Well-developed, well-nourished female.  Motor: 5 - Localizes to pain  Verbal:2 - Incomprehensible sounds  Eyes: 4 - Spontaneous eye opening  Total GCS: 11  Skin: Warm with normal turgor  Head: normocephalic  Eyes: Eyelids and conjunctivae appear normal, no discharge  Nose: No flaring or discharge present. Nasal trumpet and nasal cannula present.   Neck: Supple  Neurologic:  Mental Status: Patient with eyes spontaneously open. No commands.   Cranial Nerves:  The pupils are 3 mm, equally round and reactive to light.    Facial movements are symmetric at rest.  Motor:  Tone is normal in all four extremities. No involuntary movements.  BUE: spontaneous movement, localizing to peripheral pain  BLE: spontaneous movement, localizes to peripheral pain     ASSESSMENT:    Nessa Sumner is a 84 year old female who presented after a fall with head trauma and was found to have a right frontal contusion with associated SAH and SDH. PTA bASA and fish oil were stopped. Patient had right gaze deviation and reported weakness, concerning for seizures - cEEG initiated with confirmation of seizure activity. Interval head CTs with stable contusions; slight increase in IVH.  with O2 drop, mucus plug retrieved. No seizures on vEEG overnight. Patient is being monitored in  the Neuro ICU with gradual improvement in exam; ongoing seizure management.    PLAN:  1. No neurosurgical intervention warranted at this time   2. Continue to monitor patient's mental status and neuro exam   3. Seizure management per CC: remains on cEEG for +seizure activity; on Keppra and Vimpat (last seizure 11/23 at 10:30pm)  4. IV Rocephin for UTI  5. PT/OT, mobilize as tolerated.  6. Appreciate continued medical management by critical care  7. Neurosurgery will continue to follow.    Discussed plan of care with Dr. Arcos, Dr. Robert, nursing, family.    REG Gil  Cranial Neurosurgery    For questions please contact the answering service at [phone] and page on call NP/PA.   yes

## 2021-06-10 NOTE — CONSULT NOTE ADULT - REASON FOR ADMISSION
Renal failure and hyperkalemia in the setting of obstructive uropathy
Renal failure and hyperkalemia in the setting of obstructive uropathy

## 2021-06-10 NOTE — ED PROVIDER NOTE - PSH
Basal Cell Carcinoma of Anterior Chest; s/p MOHS surgery 8/10 proximal to suprasternal notch    H/O skin graft

## 2021-06-10 NOTE — H&P ADULT - PROBLEM SELECTOR PLAN 2
- Pt received insulin, dextrose and calcium gluconate in ER   - - Pt received insulin, dextrose, calcium gluconate, and Lokelma   - Monitor EKGs Noted to have hyperKalemia to 6.8 without EKG changes in setting of acute renal failure  Also hypochloremic hyponatremia   - Received Insulin 10u IV x1 and D50 with Calcium Gluc 1 Gm; ordered for Lokelma 10mg x1  - Can test urine lytes and osmols  - Hyponatremia likely multifactorial from Poor PO intake and acute renal failure - monitor BMP

## 2021-06-10 NOTE — CONSULT NOTE ADULT - ASSESSMENT
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 Found to be in renal failure and progression of disease on imaging. Oncology consulted for further recommendations.     #Obstructive Uropathy with Acute Kidney Failure   - Only baseline creatine is from August 2020 which was normal 0.72   - presenting with abdominal pain, found to have creatinine of 16.25,  and potassium of 6.8   - CT A/P showing left mild hydronephrosis with perinephric stranding but no obstructing calculi   - Nephrology consulted who believe likely etiology obstructive uropathy from metastatic disease   - Appreciate nephro recs: R/B US, c/w bicarb drip, monitor RFT closely and avoid nephrotoxic agents   - UA positive with cystitis and perinephric stranding seen on imaging   - f/u cultures and c/w antibiotics   - Need further GOC discussions as patient is not a candidate for HD with metastatic rectal adenocarcinoma     # Metastatic Rectal Cancer History   - Diagnosed in August 2020 after having perianal drainage x 4 weeks. Found to have complex perianal fistual and rectal mass with biopsy confirming rectal adenocarcinoma. Obstruction seen on colonoscopy and s/p diverting loop sigmoid colostomy. At time of diagnosis pulmonary metastasis seen with FNA lung nodule biopsy confirming   - Patient has been refusing systemic chemotherapy and hospice  - continue with pain management was on methadone and oxycodone   - CT A/P: POD of intrapulmonary and intraabdominal metastasis with rectosigmoid thickening   - Please consult palliative care for further GOC discussions. 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 Found to be in renal failure and progression of disease on imaging. Oncology consulted for further recommendations.     #Obstructive Uropathy with Acute Kidney Failure   - Only baseline creatine is from August 2020 which was normal 0.72   - presenting with abdominal pain, found to have creatinine of 16.25,  and potassium of 6.8   - CT A/P showing left mild hydronephrosis with perinephric stranding but no obstructing calculi   - Nephrology consulted: likely etiology obstructive uropathy from metastatic disease   - Appreciate nephro recs: R/B US, c/w bicarb drip, monitor RFT closely and avoid nephrotoxic agents   - UA positive with cystitis and perinephric stranding seen on imaging   - f/u cultures and c/w antibiotics   - Not a candidate for HD   - Appreciate palliaitive recommendations: DNR/DNI, hospice     # Metastatic Rectal Cancer History   - Diagnosed in August 2020 after having perianal drainage x 4 weeks. Found to have complex perianal fistual and rectal mass with biopsy confirming rectal adenocarcinoma. Obstruction seen on colonoscopy and s/p diverting loop sigmoid colostomy. At time of diagnosis pulmonary metastasis seen with FNA lung nodule biopsy confirming   - Patient has been refusing systemic chemotherapy and hospice  - continue with pain management per palliative  - CT A/P: POD of intrapulmonary and intraabdominal metastasis with rectosigmoid thickening   - Patient has POD on imaging, refused systemic therapy and would benefit from hospice care  - Appreciate palliative care consult and recommendations: patient now DNR/DNI, hospice     Paul Simpson MD   Hematology/Oncology Fellow   pager [phone]   After 5pm and on weekends page on call fellow  77 yo M pmhx stage 4 rectal adenocarcinoma with metastases to the lungs and stomach (not on any current treatment regimen), 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 Found to be in renal failure and progression of disease on imaging. Oncology consulted for further recommendations.     #Obstructive Uropathy with Acute Kidney Failure   - Only baseline creatine is from August 2020 which was normal 0.72   - presenting with abdominal pain, found to have creatinine of 16.25,  and potassium of 6.8   - CT A/P showing left mild hydronephrosis with perinephric stranding but no obstructing calculi   - Nephrology consulted: likely etiology obstructive uropathy from metastatic disease   - Appreciate nephro recs: R/B US, c/w bicarb drip, monitor RFT closely and avoid nephrotoxic agents   - UA positive with cystitis and perinephric stranding seen on imaging   - f/u cultures and c/w antibiotics   - Not a candidate for HD   - Appreciate palliaitive recommendations: DNR/DNI, hospice     # Metastatic Rectal Cancer History   - Diagnosed in August 2020 after having perianal drainage x 4 weeks. Found to have complex perianal fistual and rectal mass with biopsy confirming rectal adenocarcinoma. Obstruction seen on colonoscopy and s/p diverting loop sigmoid colostomy. At time of diagnosis pulmonary metastasis seen with FNA lung nodule biopsy confirming   - Patient has been refusing systemic chemotherapy and hospice  - continue with pain management per palliative  - CT A/P: POD of intrapulmonary and intraabdominal metastasis with rectosigmoid thickening   - Patient has POD on imaging, refused systemic therapy and would benefit from hospice care  - Appreciate palliative care consult and recommendations: patient now DNR/DNI, hospice   - Dr. Ellis aware of admission and plan for hospice     Paul Simpson MD   Hematology/Oncology Fellow   pager [phone]   After 5pm and on weekends page on call fellow

## 2021-06-10 NOTE — H&P ADULT - NEGATIVE IMMUNOLOGICAL SYMPTOMS
no recurring infections/no persistent infections/no recurring pneumonia
no peripheral edema/no claudication/no chest pain/no orthopnea/no dyspnea on exertion/no palpitations/no paroxysmal nocturnal dyspnea

## 2021-06-10 NOTE — ED PROVIDER NOTE - OBJECTIVE STATEMENT
76M with PMH including stage 4 colon ca with mets to stomach and lungs p/w sharp abdominal pain over the past 3 weeks. Denies any other symptoms including fever, vomiting, or change in ostomy output. Pt states he was told his cancer was terminal and as such is not on treatment. Has been taking his home methadone and oxycodone with little relief.

## 2021-06-10 NOTE — ED ADULT TRIAGE NOTE - CHIEF COMPLAINT QUOTE
Patient experiencing lower back pain and abdominal pain for several months with increased nausea and loss of appetite.  History of colon and abdominal cancer but not currently receiving tx.

## 2021-06-10 NOTE — H&P ADULT - GASTROINTESTINAL DETAILS
umbilical hernia noted/soft/no distention/no masses palpable/bowel sounds normal/no rebound tenderness/no guarding/no rigidity umbilical hernia noted, reducible non-tender/soft/no distention/no masses palpable/bowel sounds normal/no rebound tenderness/no guarding/no rigidity

## 2021-06-10 NOTE — H&P ADULT - PROBLEM SELECTOR PLAN 4
- Patient follows with outpatient oncologist, is not on any current treatment regimen    - Will discuss MOLST orders and hospice care  - Nausea and pain management - Patient follows with outpatient oncologist, is not on any current treatment regimen    - Will discuss MOLST orders however at this time patient not amenable to conversation of GOC  - Nausea and pain management

## 2021-06-10 NOTE — CONSULT NOTE ADULT - PROBLEM SELECTOR RECOMMENDATION 2
Pt. with moderate hyperkalemia in the setting of renal failure/acidemia and urinary retention. Serum potassium elevated at 6.8 on arrival confirmed to be elevated at 6.7 on repeat. Pt. received Insulin/D50 and Calcium Gluconate in ER. Pt. receiving 1L of NS in ER at the time of evaluation. Consider one dose of Lokelma 10GM. Bicarb gtt as outlined above for maintenance IVF. Can give one amp of bicarb as bolus. Check venous pH. Repeat Labs in 2-3 hours. If hyperkalemia persists will need to consider RRT if in line with GOC.    Case discussed with on call attending    If you have any questions, please feel free to contact me  Kristopher Carpio  Nephrology Fellow  [phone]  (After 5pm or on weekends please page the on-call fellow)
continue methadone 5, 5, 10mg  discontinue oxy for now  add dilaudid 0.5mg iv q 2 hours prn  bowel regimen

## 2021-06-10 NOTE — H&P ADULT - NSICDXPASTSURGICALHX_GEN_ALL_CORE_FT
PAST SURGICAL HISTORY:  Basal Cell Carcinoma of Anterior Chest; s/p MOHS surgery 8/10 proximal to suprasternal notch     H/O skin graft     S/P colostomy

## 2021-06-10 NOTE — H&P ADULT - ASSESSMENT
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

## 2021-06-10 NOTE — CONSULT NOTE ADULT - SUBJECTIVE AND OBJECTIVE BOX
HPI:  75 yo M pmhx stage 4 rectal adenocarcinoma with metastases to the lungs and stomach (not on any current treatment regimen), complex perianal fistula s/p sigmoid colostomy (2020) and prior hx of BCC of anterior chest and forehead c/o of abdominal pain x 1 month. Patient describes it as intermittent, sharp, 8/10 localized to the central abdomen. Patient has been taking his home methadone and oxycodone which brings pain to 5/10. Walking makes the pain worse. Patient also admits to insomnia, anorexia, fatigue, nausea, occasional episodes of anxiety and depression, and urinary complaints including decreased urinary frequency, dribbling and hesitancy for at least 3-4 weeks. +Decreased appetite and poor PO intake though thinks he is drinking adequate water. Denies fevers, chills, lightheadedness, dizziness, HA, cough, vision changes, chest pain, SOB, RODGERS, vomiting, constipation, weight loss or weight gain.  Leos catheter placed in ER with 300cc of turbid urine drained.  (10 Kwasi 2021 09:52)    Pt lethargic, confused at times.  unable to tell me where his pain is usually.  wife at bedside and states in back.  She has been giving methadone 5mg, 5mg, 10mg with oxy ir 5mg prn (about 4 times a day) at home.  Pt is no longer getting dmt but had refused hospice as he didn't want anyone in his home.        PERTINENT PM/SXH:   Seasonal Allergies    Basal Cell Cancer; Right side distal nose    H/O: Obesity    Rectal adenocarcinoma      Basal Cell Carcinoma of Anterior Chest; s/p MOHS surgery upper chest proximal suprasternal  notch    Basal Cell Carcinoma of Anterior Chest; s/p MOHS surgery 8/10 proximal to suprasternal notch    H/O skin graft    S/P colostomy    Rectal adenocarcinoma      FAMILY HISTORY:  No pertinent family history in first degree relatives      ITEMS NOT CHECKED ARE NOT PRESENT    SOCIAL HISTORY:   Significant other/partner:  [x ]  Children:  [x ]  Episcopal/Spirituality:  Substance hx:  [ ]   Tobacco hx:  [ ]   Alcohol hx: [ ]   Home Opioid hx:  [ ] I-Stop Reference No:  Living Situation: [x ]Home  [ ]Long term care  [ ]Rehab [ ]Other    ADVANCE DIRECTIVES:    DNR  Yes    MOLST  [ ]  Living Will  [ ]   DECISION MAKER(s):  [x ] Health Care Proxy(s)  [ ] Surrogate(s)  [ ] Guardian           Name(s): Phone Number(s):  Brigida Stephens    BASELINE (I)ADL(s) (prior to admission):  [place]: [ ]Total  [ x] Moderate [ ]Dependent    Allergies    dust (Rhinitis; Headache)  No Known Drug Allergies    Intolerances    MEDICATIONS  (STANDING):  dextrose 5% 1000 milliLiter(s) (75 mL/Hr) IV Continuous <Continuous>  heparin   Injectable 5000 Unit(s) SubCutaneous every 8 hours  lactulose Syrup 10 Gram(s) Oral four times a day  methadone    Tablet 5 milliGRAM(s) Oral <User Schedule>  methadone    Tablet 10 milliGRAM(s) Oral at bedtime  polyethylene glycol 3350 17 Gram(s) Oral two times a day  senna 2 Tablet(s) Oral at bedtime  sodium zirconium cyclosilicate 10 Gram(s) Oral once    MEDICATIONS  (PRN):  acetaminophen   Tablet .. 650 milliGRAM(s) Oral every 4 hours PRN Mild Pain (1 - 3)  HYDROmorphone  Injectable 0.5 milliGRAM(s) IV Push every 2 hours PRN Severe Pain (7 - 10)    PRESENT SYMPTOMS: [ ]Unable to obtain due to poor mentation   Source if other than patient:  [ ]Family   [ ]Team     Pain (Impact on QOL):  yes  Location - back        Minimal acceptable level (0-10 scale): unable to assess                   Aggrevating factors -  movement  Quality -  dull  Radiation -none  Severity (0-10 scale) - unable to assess   Timing -  as per wife, constant    PAIN AD Score:     [email or web address] (press ctrl +  left click to view)    Dyspnea:                           [ ]Mild [ ]Moderate [ ]Severe  Anxiety:                             [ ]Mild [ ]Moderate [ ]Severe  Fatigue:                             [ ]Mild [ ]Moderate [x ]Severe  Nausea:                             [ ]Mild [ ]Moderate [ ]Severe  Loss of appetite:              [ ]Mild [ ]Moderate [ x]Severe  Constipation:                    [ ]Mild [ ]Moderate [ ]Severe    Other Symptoms:  [x ]All other review of systems negative     Karnofsky Performance Score/Palliative Performance Status Version 2:   50      %  PHYSICAL EXAM:  Vital Signs Last 24 Hrs  T(C): 36.7 (10 Kwasi 2021 05:11), Max: 36.7 (10 Kwasi 2021 05:11)  T(F): 98 (10 Kwasi 2021 05:11), Max: 98 (10 Kwasi 2021 05:11)  HR: 78 (10 Kwasi 2021 10:50) (78 - 105)  BP: 101/63 (10 Kwasi 2021 10:50) (101/63 - 144/87)  BP(mean): --  RR: 11 (10 Kwasi 2021 10:50) (11 - 18)  SpO2: 96% (10 Kwasi 2021 10:50) (95% - 100%) I&O's Summary    10 Kwasi 2021 07:01  -  10 Kwasi 2021 15:11  --------------------------------------------------------  IN: 0 mL / OUT: 2300 mL / NET: -2300 mL    GENERAL:  [ x]Alert  [x ]Oriented x  1 [x ]Lethargic  [ ]Cachexia  [ ]Unarousable  [ ]Verbal  [ ]Non-Verbal  Behavioral:   [ ] Anxiety  [ ] Delirium [ ] Agitation [ ] Other  HEENT:  [ ]Normal   [x]Dry mouth   [ ]ET Tube/Trach  [ ]Oral lesions  PULMONARY:   [x ]Clear [ ]Tachypnea  [ ]Audible excessive secretions   [ ]Rhonchi        [ ]Right [ ]Left [ ]Bilateral  [ ]Crackles        [ ]Right [ ]Left [ ]Bilateral  [ ]Wheezing     [ ]Right [ ]Left [ ]Bilateral  CARDIOVASCULAR:    [x ]Regular [ ]Irregular [ ]Tachy  [ ]Raz [ ]Murmur [ ]Other  GASTROINTESTINAL:  [x ]Soft  [ ]Distended   [x ]+BS  [ ]Non tender [ ]Tender  [ ]PEG [ ]OGT/ NGT  Last BM: +ostomy- nothing in it  GENITOURINARY:  [ ]Normal [ ] Incontinent   [ ]Oliguria/Anuria   [x ]Leos  MUSCULOSKELETAL:   [ ]Normal   [x ]Weakness  [ ]Bed/Wheelchair bound [ ]Edema  NEUROLOGIC:   [ ]No focal deficits  [x ] Cognitive impairment  [ ] Dysphagia [ ]Dysarthria [ ] Paresis [ ]Other   SKIN:   [x ]Normal   [ ]Pressure ulcer(s)  [ ]Rash    CRITICAL CARE:  [ ] Shock Present  [ ]Septic [ ]Cardiogenic [ ]Neurologic [ ]Hypovolemic  [ ]  Vasopressors [ ]  Inotropes   [ ] Respiratory failure present  [ ] Acute  [ ] Chronic [ ] Hypoxic  [ ] Hypercarbic [ ] Other  [ ] Other organ failure     LABS:                        14.9   7.79  )-----------( 213      ( 10 Kwasi 2021 14:48 )             43.7   06-10    129<L>  |  89<L>  |  98<H>  ----------------------------<  80  5.2   |  17<L>  |  14.71<H>    Ca    9.6      10 Kwasi 2021 07:38    TPro  7.7  /  Alb  3.1<L>  /  TBili  0.3  /  DBili  x   /  AST  15  /  ALT  6   /  AlkPhos  99  06-10      Urinalysis Basic - ( 10 Kwasi 2021 07:38 )    Color: Light Orange / Appearance: Turbid / S.012 / pH: x  Gluc: x / Ketone: Negative  / Bili: Negative / Urobili: <2 mg/dL   Blood: x / Protein: >600 mg/dL / Nitrite: Negative   Leuk Esterase: Large / RBC: >50 /HPF / WBC >50 /HPF   Sq Epi: x / Non Sq Epi: x / Bacteria: Moderate      RADIOLOGY & ADDITIONAL STUDIES:    PROTEIN CALORIE MALNUTRITION:   [ ] PPSV2 < or = to 30% [ ] significant weight loss  [ ] poor nutritional intake [ ] catabolic state [ ] anasarca     Albumin, Serum: 3.1 g/dL (06-10-21 @ 04:16)  Artificial Nutrition [ ]     REFERRALS:   [ ]Chaplaincy  [ ] Hospice  [ ]Child Life  [ ]Social Work  [ ]Case management [ ]Holistic Therapy   Goals of Care Discussion Document:

## 2021-06-10 NOTE — CONSULT NOTE ADULT - SUBJECTIVE AND OBJECTIVE BOX
[facility] DIVISION OF KIDNEY DISEASES AND HYPERTENSION -- [phone]  -- INITIAL CONSULT NOTE  --------------------------------------------------------------------------------  HPI:        PAST HISTORY  --------------------------------------------------------------------------------  PAST MEDICAL & SURGICAL HISTORY:  Seasonal Allergies  Basal Cell Cancer; Right side distal nose  H/O: Obesity  Basal Cell Carcinoma of Anterior Chest; s/p MOHS surgery 8/10 proximal to suprasternal notch  H/O skin graft    FAMILY HISTORY:  No pertinent family history in first degree relatives    PAST SOCIAL HISTORY: Denies current alcohol use    ALLERGIES & MEDICATIONS  --------------------------------------------------------------------------------  Allergies    dust (Rhinitis; Headache)  No Known Drug Allergies    Intolerances    Standing Inpatient Medications  calcium gluconate IVPB 1 Gram(s) IV Intermittent Once  cefTRIAXone   IVPB 1000 milliGRAM(s) IV Intermittent once  sodium chloride 0.9% Bolus 1000 milliLiter(s) IV Bolus once    REVIEW OF SYSTEMS  --------------------------------------------------------------------------------  As noted in HPI    VITALS/PHYSICAL EXAM  --------------------------------------------------------------------------------  T(C): 36.7 (06-10-21 @ 05:11), Max: 36.7 (06-10-21 @ 05:11)  HR: 85 (06-10-21 @ 05:11) (85 - 105)  BP: 144/87 (06-10-21 @ 05:11) (143/91 - 144/87)  RR: 16 (06-10-21 @ 05:11) (16 - 18)  SpO2: 95% (06-10-21 @ 05:11) (95% - 100%)  Wt(kg): --  Height (cm): 177.8 (06-10-21 @ 01:01)    Physical Exam:  	Gen: Ill appearing male in NAD  	HEENT: Anicteric, Dry mucous membranes  	Pulm: CTA B/L  	CV: S1S2  	Abd: Soft, +BS , Left sided colostomy bag +               : Leos with Turbid urine  	Ext: No LE edema B/L  	Neuro: Awake  	Skin: Warm and dry    LABS/STUDIES  --------------------------------------------------------------------------------              16.5   11.23 >-----------<  302      [06-10-21 @ 04:16]              49.0     129  |  83  |  100  ----------------------------<  85      [06-10-21 @ 04:16]  6.8   |  17  |  16.25        Ca     10.5     [06-10-21 @ 04:16]    TPro  7.7  /  Alb  3.1  /  TBili  0.3  /  DBili  x   /  AST  15  /  ALT  6   /  AlkPhos  99  [06-10-21 @ 04:16]    Creatinine Trend:  SCr 16.25 [06-10 @ 04:16]    Urinalysis - [08-05-20 @ 13:26]      Color YELLOW / Appearance CLEAR / SG 1.025 / pH 6.0      Gluc NEGATIVE / Ketone TRACE  / Bili NEGATIVE / Urobili SMALL       Blood NEGATIVE / Protein NEGATIVE / Leuk Est NEGATIVE / Nitrite NEGATIVE      RBC  / WBC  / Hyaline  / Gran  / Sq Epi  / Non Sq Epi  / Bacteria  [facility] DIVISION OF KIDNEY DISEASES AND HYPERTENSION -- [phone]  -- INITIAL CONSULT NOTE  --------------------------------------------------------------------------------  HPI: 76 year old male with rectal adenocarcinoma metastatic to the lungs and prior history of basal cell skin carcinoma admitted with malaise, nausea and ongoing worsening abdominal pain. Nephrology consultation requested for hyperkalemia and renal failure as evident on admission labs. On review of outpatient Oncology notes pt. with metastatic rectal adenocarcinoma currently not a candidate for chemotherapy (? patient preference) and has previously declined to enroll in Hospice. Pt. is on opiate pain medications for chronic abdominal pain. Of note, pt. with history of sigmoid colostomy since Aug 2020 after initially presenting with rectal mass/fistula. Scr on admission elevated at 16.25 with serum potassium of 6.8 (mildly hemolyzed). On review of labs on [facility] Scr was WNL at 0.87 on 9/19/2020.     Pt. was seen at bedside in ER this AM. Pt. has poor insight about medical history and unable to provide details about his cancer. Pt. with no prior history of kidney disease. Pt. states has had worsening belly pain for few weeks and has had nausea and poor appetite since the last 4-5 days. Pt. denies dysuria but complained of frequent dribbling. Leos catheter placed in the ER with 300cc of turbid urine drained while evaluation at the bedside.    PAST HISTORY  --------------------------------------------------------------------------------  PAST MEDICAL & SURGICAL HISTORY:  Seasonal Allergies  Basal Cell Cancer; Right side distal nose  H/O: Obesity  Basal Cell Carcinoma of Anterior Chest; s/p MOHS surgery 8/10 proximal to suprasternal notch  H/O skin graft    FAMILY HISTORY:  No pertinent family history in first degree relatives    PAST SOCIAL HISTORY: Denies current alcohol use    ALLERGIES & MEDICATIONS  --------------------------------------------------------------------------------  Allergies    dust (Rhinitis; Headache)  No Known Drug Allergies    Intolerances    Standing Inpatient Medications  calcium gluconate IVPB 1 Gram(s) IV Intermittent Once  cefTRIAXone   IVPB 1000 milliGRAM(s) IV Intermittent once  sodium chloride 0.9% Bolus 1000 milliLiter(s) IV Bolus once    REVIEW OF SYSTEMS  --------------------------------------------------------------------------------  As noted in HPI    VITALS/PHYSICAL EXAM  --------------------------------------------------------------------------------  T(C): 36.7 (06-10-21 @ 05:11), Max: 36.7 (06-10-21 @ 05:11)  HR: 85 (06-10-21 @ 05:11) (85 - 105)  BP: 144/87 (06-10-21 @ 05:11) (143/91 - 144/87)  RR: 16 (06-10-21 @ 05:11) (16 - 18)  SpO2: 95% (06-10-21 @ 05:11) (95% - 100%)  Wt(kg): --  Height (cm): 177.8 (06-10-21 @ 01:01)    Physical Exam:  	Gen: Ill appearing male in NAD  	HEENT: Anicteric, Dry mucous membranes  	Pulm: CTA B/L  	CV: S1S2  	Abd: Soft, +BS , Left sided colostomy bag +               : Leos with Turbid urine  	Ext: No LE edema B/L  	Neuro: Awake  	Skin: Warm and dry    LABS/STUDIES  --------------------------------------------------------------------------------              16.5   11.23 >-----------<  302      [06-10-21 @ 04:16]              49.0     129  |  83  |  100  ----------------------------<  85      [06-10-21 @ 04:16]  6.8   |  17  |  16.25        Ca     10.5     [06-10-21 @ 04:16]    TPro  7.7  /  Alb  3.1  /  TBili  0.3  /  DBili  x   /  AST  15  /  ALT  6   /  AlkPhos  99  [06-10-21 @ 04:16]    Creatinine Trend:  SCr 16.25 [06-10 @ 04:16]    Urinalysis - [08-05-20 @ 13:26]      Color YELLOW / Appearance CLEAR / SG 1.025 / pH 6.0      Gluc NEGATIVE / Ketone TRACE  / Bili NEGATIVE / Urobili SMALL       Blood NEGATIVE / Protein NEGATIVE / Leuk Est NEGATIVE / Nitrite NEGATIVE      RBC  / WBC  / Hyaline  / Gran  / Sq Epi  / Non Sq Epi  / Bacteria

## 2021-06-10 NOTE — H&P ADULT - HISTORY OF PRESENT ILLNESS
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 Patient describes it as intermittent, sharp, 8/10 localized to the central abdomen. Patient has been taking his home methadone and oxycodone which brings pain to 5/10. Walking makes the pain worse. Patient also admits to insomnia, anorexia, fatigue, nausea, occasional episodes of anxiety and depression, and urinary complaints including decreased urinary frequency, dribbling and hesitancy x . Patient is not currently receiving treatment for Ca. Denies fevers, chills, lightheadedness, dizziness, HA, cough, vision changes, chest pain, SOB, RODGERS, vomiting, constipation, dysuria, weight loss or weight gain.  Leos catheter placed in ER with 300cc of turbid urine drained.  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 Patient describes it as intermittent, sharp, 8/10 localized to the central abdomen. Patient has been taking his home methadone and oxycodone which brings pain to 5/10. Walking makes the pain worse. Patient also admits to insomnia, anorexia, fatigue, nausea, occasional episodes of anxiety and depression, and urinary complaints including decreased urinary frequency, dribbling and hesitancy. Pt could not specify when urinary symptoms began. Denies fevers, chills, lightheadedness, dizziness, HA, cough, vision changes, chest pain, SOB, RODGERS, vomiting, constipation, dysuria, weight loss or weight gain.  Leos catheter placed in ER with 300cc of turbid urine drained.  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 Patient describes it as intermittent, sharp, 8/10 localized to the central abdomen. Patient has been taking his home methadone and oxycodone which brings pain to 5/10. Walking makes the pain worse. Patient also admits to insomnia, anorexia, fatigue, nausea, occasional episodes of anxiety and depression, and urinary complaints including decreased urinary frequency, dribbling and hesitancy for at least 3-4 weeks. +Decreased appetite and poor PO intake though thinks he is drinking adequate water. Denies fevers, chills, lightheadedness, dizziness, HA, cough, vision changes, chest pain, SOB, RODGERS, vomiting, constipation, weight loss or weight gain.  Leos catheter placed in ER with 300cc of turbid urine drained.

## 2021-06-10 NOTE — H&P ADULT - NEGATIVE NEUROLOGICAL SYMPTOMS
no transient paralysis/no paresthesias/no syncope/no tremors/no vertigo/no loss of sensation/no difficulty walking/no headache/no loss of consciousness/no confusion

## 2021-06-10 NOTE — ED PROVIDER NOTE - CLINICAL SUMMARY MEDICAL DECISION MAKING FREE TEXT BOX
76M with PMH including stage 4 colon ca with mets to stomach and lungs (no plans for treatment) p/w sharp abdominal pain over the past 3 weeks. Denies any other symptoms including fever, vomiting, or change in ostomy output. Pt with mild diffuse TTP without rebound/guarding. Labs, CT, & likely admit for pain control.

## 2021-06-11 LAB
A1C WITH ESTIMATED AVERAGE GLUCOSE RESULT: 5.4 % — SIGNIFICANT CHANGE UP (ref 4–5.6)
ANION GAP SERPL CALC-SCNC: 13 MMOL/L — SIGNIFICANT CHANGE UP (ref 7–14)
BUN SERPL-MCNC: 41 MG/DL — HIGH (ref 7–23)
CALCIUM SERPL-MCNC: 9.1 MG/DL — SIGNIFICANT CHANGE UP (ref 8.4–10.5)
CHLORIDE SERPL-SCNC: 99 MMOL/L — SIGNIFICANT CHANGE UP (ref 98–107)
CHOLEST SERPL-MCNC: 112 MG/DL — SIGNIFICANT CHANGE UP
CO2 SERPL-SCNC: 28 MMOL/L — SIGNIFICANT CHANGE UP (ref 22–31)
COVID-19 SPIKE DOMAIN AB INTERP: NEGATIVE — SIGNIFICANT CHANGE UP
COVID-19 SPIKE DOMAIN ANTIBODY RESULT: 0.4 U/ML — SIGNIFICANT CHANGE UP
CREAT SERPL-MCNC: 2.75 MG/DL — HIGH (ref 0.5–1.3)
CULTURE RESULTS: NO GROWTH — SIGNIFICANT CHANGE UP
ESTIMATED AVERAGE GLUCOSE: 108 MG/DL — SIGNIFICANT CHANGE UP (ref 68–114)
GLUCOSE SERPL-MCNC: 115 MG/DL — HIGH (ref 70–99)
HCT VFR BLD CALC: 44.8 % — SIGNIFICANT CHANGE UP (ref 39–50)
HDLC SERPL-MCNC: 36 MG/DL — LOW
HGB BLD-MCNC: 15.1 G/DL — SIGNIFICANT CHANGE UP (ref 13–17)
LIPID PNL WITH DIRECT LDL SERPL: 56 MG/DL — SIGNIFICANT CHANGE UP
MAGNESIUM SERPL-MCNC: 1.9 MG/DL — SIGNIFICANT CHANGE UP (ref 1.6–2.6)
MCHC RBC-ENTMCNC: 31 PG — SIGNIFICANT CHANGE UP (ref 27–34)
MCHC RBC-ENTMCNC: 33.7 GM/DL — SIGNIFICANT CHANGE UP (ref 32–36)
MCV RBC AUTO: 92 FL — SIGNIFICANT CHANGE UP (ref 80–100)
NON HDL CHOLESTEROL: 76 MG/DL — SIGNIFICANT CHANGE UP
NRBC # BLD: 0 /100 WBCS — SIGNIFICANT CHANGE UP
NRBC # FLD: 0 K/UL — SIGNIFICANT CHANGE UP
PHOSPHATE SERPL-MCNC: 3.1 MG/DL — SIGNIFICANT CHANGE UP (ref 2.5–4.5)
PLATELET # BLD AUTO: 193 K/UL — SIGNIFICANT CHANGE UP (ref 150–400)
POTASSIUM SERPL-MCNC: 3.3 MMOL/L — LOW (ref 3.5–5.3)
POTASSIUM SERPL-SCNC: 3.3 MMOL/L — LOW (ref 3.5–5.3)
RBC # BLD: 4.87 M/UL — SIGNIFICANT CHANGE UP (ref 4.2–5.8)
RBC # FLD: 14.3 % — SIGNIFICANT CHANGE UP (ref 10.3–14.5)
SARS-COV-2 IGG+IGM SERPL QL IA: 0.4 U/ML — SIGNIFICANT CHANGE UP
SARS-COV-2 IGG+IGM SERPL QL IA: NEGATIVE — SIGNIFICANT CHANGE UP
SODIUM SERPL-SCNC: 140 MMOL/L — SIGNIFICANT CHANGE UP (ref 135–145)
SPECIMEN SOURCE: SIGNIFICANT CHANGE UP
TRIGL SERPL-MCNC: 101 MG/DL — SIGNIFICANT CHANGE UP
WBC # BLD: 7.78 K/UL — SIGNIFICANT CHANGE UP (ref 3.8–10.5)
WBC # FLD AUTO: 7.78 K/UL — SIGNIFICANT CHANGE UP (ref 3.8–10.5)

## 2021-06-11 PROCEDURE — 99233 SBSQ HOSP IP/OBS HIGH 50: CPT | Mod: GC

## 2021-06-11 PROCEDURE — 99232 SBSQ HOSP IP/OBS MODERATE 35: CPT

## 2021-06-11 RX ORDER — LACTULOSE 10 G/15ML
10 SOLUTION ORAL
Refills: 0 | Status: DISCONTINUED | OUTPATIENT
Start: 2021-06-11 | End: 2021-06-15

## 2021-06-11 RX ORDER — ONDANSETRON 8 MG/1
4 TABLET, FILM COATED ORAL ONCE
Refills: 0 | Status: COMPLETED | OUTPATIENT
Start: 2021-06-11 | End: 2021-06-11

## 2021-06-11 RX ORDER — ONDANSETRON 8 MG/1
4 TABLET, FILM COATED ORAL ONCE
Refills: 0 | Status: DISCONTINUED | OUTPATIENT
Start: 2021-06-11 | End: 2021-06-11

## 2021-06-11 RX ORDER — SODIUM CHLORIDE 9 MG/ML
1000 INJECTION, SOLUTION INTRAVENOUS
Refills: 0 | Status: DISCONTINUED | OUTPATIENT
Start: 2021-06-11 | End: 2021-06-15

## 2021-06-11 RX ORDER — POTASSIUM CHLORIDE 20 MEQ
40 PACKET (EA) ORAL EVERY 4 HOURS
Refills: 0 | Status: COMPLETED | OUTPATIENT
Start: 2021-06-11 | End: 2021-06-11

## 2021-06-11 RX ORDER — METOCLOPRAMIDE HCL 10 MG
10 TABLET ORAL ONCE
Refills: 0 | Status: COMPLETED | OUTPATIENT
Start: 2021-06-11 | End: 2021-06-11

## 2021-06-11 RX ADMIN — HYDROMORPHONE HYDROCHLORIDE 0.5 MILLIGRAM(S): 2 INJECTION INTRAMUSCULAR; INTRAVENOUS; SUBCUTANEOUS at 06:26

## 2021-06-11 RX ADMIN — HYDROMORPHONE HYDROCHLORIDE 0.5 MILLIGRAM(S): 2 INJECTION INTRAMUSCULAR; INTRAVENOUS; SUBCUTANEOUS at 17:09

## 2021-06-11 RX ADMIN — Medication 10 MILLIGRAM(S): at 11:49

## 2021-06-11 RX ADMIN — SENNA PLUS 2 TABLET(S): 8.6 TABLET ORAL at 22:46

## 2021-06-11 RX ADMIN — HYDROMORPHONE HYDROCHLORIDE 0.5 MILLIGRAM(S): 2 INJECTION INTRAMUSCULAR; INTRAVENOUS; SUBCUTANEOUS at 11:41

## 2021-06-11 RX ADMIN — HYDROMORPHONE HYDROCHLORIDE 0.5 MILLIGRAM(S): 2 INJECTION INTRAMUSCULAR; INTRAVENOUS; SUBCUTANEOUS at 06:11

## 2021-06-11 RX ADMIN — METHADONE HYDROCHLORIDE 5 MILLIGRAM(S): 40 TABLET ORAL at 13:38

## 2021-06-11 RX ADMIN — SODIUM CHLORIDE 75 MILLILITER(S): 9 INJECTION, SOLUTION INTRAVENOUS at 11:41

## 2021-06-11 RX ADMIN — SODIUM CHLORIDE 75 MILLILITER(S): 9 INJECTION, SOLUTION INTRAVENOUS at 00:28

## 2021-06-11 RX ADMIN — HEPARIN SODIUM 5000 UNIT(S): 5000 INJECTION INTRAVENOUS; SUBCUTANEOUS at 13:39

## 2021-06-11 RX ADMIN — CEFTRIAXONE 100 MILLIGRAM(S): 500 INJECTION, POWDER, FOR SOLUTION INTRAMUSCULAR; INTRAVENOUS at 06:12

## 2021-06-11 RX ADMIN — ONDANSETRON 4 MILLIGRAM(S): 8 TABLET, FILM COATED ORAL at 20:32

## 2021-06-11 RX ADMIN — HYDROMORPHONE HYDROCHLORIDE 0.5 MILLIGRAM(S): 2 INJECTION INTRAMUSCULAR; INTRAVENOUS; SUBCUTANEOUS at 20:27

## 2021-06-11 RX ADMIN — HYDROMORPHONE HYDROCHLORIDE 0.5 MILLIGRAM(S): 2 INJECTION INTRAMUSCULAR; INTRAVENOUS; SUBCUTANEOUS at 11:56

## 2021-06-11 RX ADMIN — HEPARIN SODIUM 5000 UNIT(S): 5000 INJECTION INTRAVENOUS; SUBCUTANEOUS at 22:47

## 2021-06-11 RX ADMIN — HYDROMORPHONE HYDROCHLORIDE 0.5 MILLIGRAM(S): 2 INJECTION INTRAMUSCULAR; INTRAVENOUS; SUBCUTANEOUS at 20:39

## 2021-06-11 RX ADMIN — HYDROMORPHONE HYDROCHLORIDE 0.5 MILLIGRAM(S): 2 INJECTION INTRAMUSCULAR; INTRAVENOUS; SUBCUTANEOUS at 00:44

## 2021-06-11 RX ADMIN — HYDROMORPHONE HYDROCHLORIDE 0.5 MILLIGRAM(S): 2 INJECTION INTRAMUSCULAR; INTRAVENOUS; SUBCUTANEOUS at 17:23

## 2021-06-11 RX ADMIN — METHADONE HYDROCHLORIDE 5 MILLIGRAM(S): 40 TABLET ORAL at 08:56

## 2021-06-11 RX ADMIN — ONDANSETRON 4 MILLIGRAM(S): 8 TABLET, FILM COATED ORAL at 08:55

## 2021-06-11 RX ADMIN — HEPARIN SODIUM 5000 UNIT(S): 5000 INJECTION INTRAVENOUS; SUBCUTANEOUS at 06:11

## 2021-06-11 RX ADMIN — METHADONE HYDROCHLORIDE 10 MILLIGRAM(S): 40 TABLET ORAL at 22:46

## 2021-06-11 RX ADMIN — HYDROMORPHONE HYDROCHLORIDE 0.5 MILLIGRAM(S): 2 INJECTION INTRAMUSCULAR; INTRAVENOUS; SUBCUTANEOUS at 00:29

## 2021-06-11 RX ADMIN — Medication 40 MILLIEQUIVALENT(S): at 13:39

## 2021-06-11 RX ADMIN — ONDANSETRON 4 MILLIGRAM(S): 8 TABLET, FILM COATED ORAL at 06:31

## 2021-06-11 NOTE — PROGRESS NOTE ADULT - PROBLEM SELECTOR PLAN 4
- Patient follows with outpatient oncologist, is not on any current treatment regimen    - MOLST in chart

## 2021-06-11 NOTE — PROGRESS NOTE ADULT - SUBJECTIVE AND OBJECTIVE BOX
Patient is a 76y old  Male who presents with a chief complaint of Renal failure and hyperkalemia in the setting of obstructive uropathy (2021 11:56)      SUBJECTIVE / OVERNIGHT EVENTS: Pt noted abdominal pain this AM with relief with pain meds  ADDITIONAL REVIEW OF SYSTEMS: denies N/V     MEDICATIONS  (STANDING):  cefTRIAXone   IVPB 1000 milliGRAM(s) IV Intermittent every 24 hours  heparin   Injectable 5000 Unit(s) SubCutaneous every 8 hours  lactated ringers. 1000 milliLiter(s) (75 mL/Hr) IV Continuous <Continuous>  lactulose Syrup 10 Gram(s) Oral four times a day  methadone    Tablet 5 milliGRAM(s) Oral <User Schedule>  methadone    Tablet 10 milliGRAM(s) Oral at bedtime  polyethylene glycol 3350 17 Gram(s) Oral two times a day  potassium chloride   Powder 40 milliEquivalent(s) Oral every 4 hours  senna 2 Tablet(s) Oral at bedtime  sodium zirconium cyclosilicate 10 Gram(s) Oral once    MEDICATIONS  (PRN):  acetaminophen   Tablet .. 650 milliGRAM(s) Oral every 4 hours PRN Mild Pain (1 - 3)  HYDROmorphone  Injectable 0.5 milliGRAM(s) IV Push every 2 hours PRN Severe Pain (7 - 10)      CAPILLARY BLOOD GLUCOSE        I&O's Summary    10 Kwasi 2021 07:  -  2021 07:00  --------------------------------------------------------  IN: 1400 mL / OUT: 3480 mL / NET: -2080 mL    2021 07:01  -  2021 12:26  --------------------------------------------------------  IN: 280 mL / OUT: 500 mL / NET: -220 mL        PHYSICAL EXAM:  Vital Signs Last 24 Hrs  T(C): 36.4 (2021 08:29), Max: 36.7 (10 Kwasi 2021 15:48)  T(F): 97.5 (2021 08:29), Max: 98 (10 Kwasi 2021 15:48)  HR: 79 (2021 08:29) (78 - 96)  BP: 117/78 (2021 08:29) (97/43 - 123/83)  BP(mean): --  RR: 18 (2021 08:29) (15 - 18)  SpO2: 96% (2021 08:29) (95% - 99%)      PHYSICAL EXAM:  GENERAL: NAD   HEAD:  Atraumatic, Normocephalic  EYES: EOMI, conjunctiva and sclera clear  NECK: Supple, No JVD  CHEST/LUNG: Clear to auscultation bilaterally; No wheeze  HEART: Regular rate and rhythm; No murmurs, rubs, or gallops  ABDOMEN: Soft, Nontender, Nondistended; Bowel sounds present +garcia  EXTREMITIES:   No clubbing, cyanosis, or edema    LABS:                        15.1   7.78  )-----------( 193      ( 2021 06:53 )             44.8     06-11    140  |  99  |  41<H>  ----------------------------<  115<H>  3.3<L>   |  28  |  2.75<H>    Ca    9.1      2021 06:53  Phos  3.1     06-11  Mg     1.9     06-11    TPro  7.7  /  Alb  3.1<L>  /  TBili  0.3  /  DBili  x   /  AST  15  /  ALT  6   /  AlkPhos  99  06-10          Urinalysis Basic - ( 10 Kwasi 2021 07:38 )    Color: Light Orange / Appearance: Turbid / S.012 / pH: x  Gluc: x / Ketone: Negative  / Bili: Negative / Urobili: <2 mg/dL   Blood: x / Protein: >600 mg/dL / Nitrite: Negative   Leuk Esterase: Large / RBC: >50 /HPF / WBC >50 /HPF   Sq Epi: x / Non Sq Epi: x / Bacteria: Moderate        Culture - Urine (collected 10 Kwasi 2021 06:26)  Source: .Urine Clean Catch (Midstream)  Final Report (2021 11:04):    No growth        RADIOLOGY & ADDITIONAL TESTS:  Results Reviewed:   Imaging Personally Reviewed:  Electrocardiogram Personally Reviewed:    COORDINATION OF CARE:  Care Discussed with Consultants/Other Providers [Y/N]:  Prior or Outpatient Records Reviewed [Y/N]:

## 2021-06-11 NOTE — PROVIDER CONTACT NOTE (OTHER) - SITUATION
Patient refusing lactulose and Miralax. Patient educated on risk and benefits. No output in colostomy this shift

## 2021-06-11 NOTE — PROGRESS NOTE ADULT - PROBLEM SELECTOR PLAN 4
overall prognosis is poor   goal is for home with hospice  referral made  pt is dnr/dni- molst in chart

## 2021-06-11 NOTE — PROVIDER CONTACT NOTE (OTHER) - SITUATION
As reported by tele tech, patient had burst of . Patient returned to . Patient asymptomatic and was sleeping during event As reported by tele tech, patient had burst of . Patient returned to A.fib . Patient asymptomatic and was sleeping during event

## 2021-06-11 NOTE — PROGRESS NOTE ADULT - ASSESSMENT
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 now in acute renal failure.  asked to see for pain management and goc

## 2021-06-11 NOTE — PROGRESS NOTE ADULT - ASSESSMENT
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

## 2021-06-11 NOTE — PROGRESS NOTE ADULT - PROBLEM SELECTOR PLAN 1
- secondary to obstructive uropathy  - Patient with ~1 month of urinary hesitancy, dysuria, and decreased urine output as well as abdominal pain now noted to have a Cr of ~16    - Cr downtrended 2.75  - Renal US LT hydro  - palliative recs appreciated- pain meds Oxy IR 5-10 q4 PRN breakthru pain and methadone methadone 5mg, 5mg, 10mg Detail Level: Zone

## 2021-06-11 NOTE — PROGRESS NOTE ADULT - SUBJECTIVE AND OBJECTIVE BOX
SUBJECTIVE AND OBJECTIVE:  pt states he received pain meds for his back pain which is well controlled.  Confused.  +urine output  INTERVAL HPI/OVERNIGHT EVENTS:    DNR on chart: Yes      Allergies    dust (Rhinitis; Headache)  No Known Drug Allergies    Intolerances    MEDICATIONS  (STANDING):  cefTRIAXone   IVPB 1000 milliGRAM(s) IV Intermittent every 24 hours  heparin   Injectable 5000 Unit(s) SubCutaneous every 8 hours  lactated ringers. 1000 milliLiter(s) (75 mL/Hr) IV Continuous <Continuous>  lactulose Syrup 10 Gram(s) Oral four times a day  methadone    Tablet 5 milliGRAM(s) Oral <User Schedule>  methadone    Tablet 10 milliGRAM(s) Oral at bedtime  polyethylene glycol 3350 17 Gram(s) Oral two times a day  potassium chloride   Powder 40 milliEquivalent(s) Oral every 4 hours  senna 2 Tablet(s) Oral at bedtime  sodium zirconium cyclosilicate 10 Gram(s) Oral once    MEDICATIONS  (PRN):  acetaminophen   Tablet .. 650 milliGRAM(s) Oral every 4 hours PRN Mild Pain (1 - 3)  HYDROmorphone  Injectable 0.5 milliGRAM(s) IV Push every 2 hours PRN Severe Pain (7 - 10)      ITEMS UNCHECKED ARE NOT PRESENT    PRESENT SYMPTOMS: [ ]Unable to obtain due to poor mentation   Source if other than patient:  [ ]Family   [ ]Team     Pain (Impact on QOL):  yes  Location: back  Minimal acceptable level (0-10 scale):     4/10      Aggravating factors:  none  Quality:  dull  Radiation:  none  Severity (0-10 scale):  4/10  Timing: comes and goes    Dyspnea:                           [ ]Mild [ ]Moderate [ ]Severe  Anxiety:                             [ ]Mild [ ]Moderate [ ]Severe  Fatigue:                             [ ]Mild [ ]Moderate [ x]Severe  Nausea:                             [ ]Mild [x ]Moderate [ ]Severe  Loss of appetite:              [ ]Mild [ ]Moderate [ x]Severe  Constipation:                    [ ]Mild [ ]Moderate [ ]Severe    PAIN AD Score:	  [email or web address].[email or web address].[email or web address]/painad.pdf (Ctrl + left click to view)    Other Symptoms:  [x ]All other review of systems negative     Karnofsky Performance Score/Palliative Performance Status Version 2:   50      %    http://palliative.info/resource_material/[email or web address].pdf  PHYSICAL EXAM:  Vital Signs Last 24 Hrs  T(C): 36.4 (2021 08:29), Max: 36.7 (10 Kwasi 2021 15:48)  T(F): 97.5 (2021 08:29), Max: 98 (10 Kwasi 2021 15:48)  HR: 79 (2021 08:29) (78 - 96)  BP: 117/78 (2021 08:29) (97/43 - 123/83)  BP(mean): --  RR: 18 (2021 08:29) (15 - 18)  SpO2: 96% (2021 08:29) (95% - 99%) I&O's Summary    10 Kwasi 2021 07:01  -  2021 07:00  --------------------------------------------------------  IN: 1400 mL / OUT: 3480 mL / NET: -2080 mL     GENERAL:  [x ]Alert  [x]Oriented x 1  [x ]Lethargic  [ ]Cachexia  [ ]Unarousable  [ ]Verbal  [ ]Non-Verbal    Behavioral:   [ ] Anxiety  [x ] Delirium [ ] Agitation [ ] Other    HEENT:  [ ]Normal   [ x]Dry mouth   [ ]ET Tube/Trach  [ ]Oral lesions    PULMONARY:   [x ]Clear [ ]Tachypnea  [ ]Audible excessive secretions   [ ]Rhonchi        [ ]Right [ ]Left [ ]Bilateral  [ ]Crackles        [ ]Right [ ]Left [ ]Bilateral  [ ]Wheezing     [ ]Right [ ]Left [ ]Bilateral    CARDIOVASCULAR:    [x ]Regular [ ]Irregular [ ]Tachy  [ ]Raz [ ]Murmur [ ]Other    GASTROINTESTINAL:  [x ]Soft  [x ]Distended   [ ]+BS  [ ]Non tender [ ]Tender  [ ]PEG [ ]OGT/ NGT   Last BM: +ostomy   GENITOURINARY:  [ ]Normal [ ] Incontinent   [ ]Oliguria/Anuria   [x ]Leos    MUSCULOSKELETAL:   [ ]Normal   x[ ]Weakness  [ ]Bed/Wheelchair bound [ ]Edema    NEUROLOGIC:   [ ]No focal deficits  [x ] Cognitive impairment  [ ] Dysphagia [ ]Dysarthria [ ] Paresis [ ]Other     SKIN:   [ x]Normal   [ ]Pressure ulcer(s)  [ ]Rash    CRITICAL CARE:  [ ] Shock Present  [ ]Septic [ ]Cardiogenic [ ]Neurologic [ ]Hypovolemic  [ ]  Vasopressors [ ]  Inotropes   [ ] Respiratory failure present  [ ] Acute  [ ] Chronic [ ] Hypoxic  [ ] Hypercarbic [ ] Other  [ ] Other organ failure     LABS:                        15.1   7.78  )-----------( 193      ( 2021 06:53 )             44.8   06-    140  |  99  |  41<H>  ----------------------------<  115<H>  3.3<L>   |  28  |  2.75<H>    Ca    9.1      2021 06:53  Phos  3.1       Mg     1.9         TPro  7.7  /  Alb  3.1<L>  /  TBili  0.3  /  DBili  x   /  AST  15  /  ALT  6   /  AlkPhos  99  06-10      Urinalysis Basic - ( 10 Kwasi 2021 07:38 )    Color: Light Orange / Appearance: Turbid / S.012 / pH: x  Gluc: x / Ketone: Negative  / Bili: Negative / Urobili: <2 mg/dL   Blood: x / Protein: >600 mg/dL / Nitrite: Negative   Leuk Esterase: Large / RBC: >50 /HPF / WBC >50 /HPF   Sq Epi: x / Non Sq Epi: x / Bacteria: Moderate      RADIOLOGY & ADDITIONAL STUDIES:    Protein Calorie Malnutrition Present: [ ] yes [ ] no  [ ] PPSV2 < or = 30%  [ ] significant weight loss [ ] poor nutritional intake [ ] anasarca [ ] catabolic state Albumin, Serum: 3.1 g/dL (06-10-21 @ 04:16)  Artificial Nutrition [ ]     REFERRALS:   [ ]Chaplaincy  [ ] Hospice  [ ]Child Life  [ ]Social Work  [ ]Case management [ ]Holistic Therapy   Goals of Care Document:

## 2021-06-11 NOTE — PROGRESS NOTE ADULT - ASSESSMENT
Problem/Recommendation - 1:  Problem: Renal failure, unspecified chronicity. Recommendation: Pt. with renal failure in the setting of urinary retention/obstructive uropathy. Exact duration of renal failure however unknown. Scr on admission elevated at 16.25 with prior normal SCr in September 2020.   Pt. with history of metastatic rectal adenocarcinoma.   Pt. s/p garcia catheter placement in ER with 300 cc of turbid urine drained.   Pt. likely has ALPHONSE in the setting of acute urinary retention.   Left Mild  hydronephrosis.   Continue Garcia catheter       Problem/Recommendation - 2:  ·  Problem: Hyperkalemia.  Recommendation: Pt. with moderate hyperkalemia in the setting of renal failure/acidemia and urinary retention. Serum potassium elevated at 6.8 on arrival confirmed to be elevated at 6.7 on repeat. Pt. received Insulin/D50 and Calcium Gluconate in ER. Pt. receiving 1L of NS in ER at the time of evaluation. Consider one dose of Lokelma 10GM. Bicarb gtt as outlined above for maintenance IVF. Can give one amp of bicarb as bolus.   now resolved     6/11/21  metastatic rectal cancer presents with obstructive uropathy renal failure, Hyperkalemia and acidosis   Garcia draining with improving cr    Continue with garcai  DC Bicarbonate gtt as Co2 28  change to LR as hypokalemia    avoid ACE/ARB/Contrasts/NSAIDs   US of kidneys noted     Ramón Strong MD, FACP.  Attending Nephrologist  Office: [phone]  Pager: [phone]  Email: [email or web address]

## 2021-06-11 NOTE — PROGRESS NOTE ADULT - SUBJECTIVE AND OBJECTIVE BOX
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 Patient describes it as intermittent, sharp, 8/10 localized to the central abdomen. Patient has been taking his home methadone and oxycodone which brings pain to 5/10. Walking makes the pain worse. Patient also admits to insomnia, anorexia, fatigue, nausea, occasional episodes of anxiety and depression, and urinary complaints including decreased urinary frequency, dribbling and hesitancy for at least 3-4 weeks. +Decreased appetite and poor PO intake though thinks he is drinking adequate water. Denies fevers, chills, lightheadedness, dizziness, HA, cough, vision changes, chest pain, SOB, RODGERS, vomiting, constipation, weight loss or weight gain.  Garcia catheter placed in ER with 300cc of turbid urine drained.  (10 Kwasi 2021 09:52)          24 hour events:    on gtt   good urine output garcia       PAST HISTORY  --------------------------------------------------------------------------------  No significant changes to PMH, PSH, FHx, SHx, unless otherwise noted    ALLERGIES & MEDICATIONS  --------------------------------------------------------------------------------  Allergies    dust (Rhinitis; Headache)  No Known Drug Allergies    Intolerances      Standing Inpatient Medications  cefTRIAXone   IVPB 1000 milliGRAM(s) IV Intermittent every 24 hours  dextrose 5% 1000 milliLiter(s) IV Continuous <Continuous>  heparin   Injectable 5000 Unit(s) SubCutaneous every 8 hours  lactulose Syrup 10 Gram(s) Oral four times a day  methadone    Tablet 5 milliGRAM(s) Oral <User Schedule>  methadone    Tablet 10 milliGRAM(s) Oral at bedtime  polyethylene glycol 3350 17 Gram(s) Oral two times a day  potassium chloride   Powder 40 milliEquivalent(s) Oral every 4 hours  senna 2 Tablet(s) Oral at bedtime  sodium zirconium cyclosilicate 10 Gram(s) Oral once    PRN Inpatient Medications  acetaminophen   Tablet .. 650 milliGRAM(s) Oral every 4 hours PRN  HYDROmorphone  Injectable 0.5 milliGRAM(s) IV Push every 2 hours PRN      REVIEW OF SYSTEMS  --------------------------------------------------------------------------------  Gen: No  fevers/chills  Skin: No rashes  Respiratory: no SOB  CV: No chest pain,   GI: No abdominal pain  :  no oliguria   MSK: No joint pain/ No swelling;     All other systems were reviewed and are negative, except as noted.      VITALS/PHYSICAL EXAM  --------------------------------------------------------------------------------  T(C): 36.4 (06-11-21 @ 08:29), Max: 36.7 (06-10-21 @ 15:48)  HR: 79 (06-11-21 @ 08:29) (78 - 96)  BP: 117/78 (06-11-21 @ 08:29) (97/43 - 123/83)  RR: 18 (06-11-21 @ 08:29) (11 - 18)  SpO2: 96% (06-11-21 @ 08:29) (95% - 99%)  Wt(kg): --  Height (cm): 175.3 (06-10-21 @ 09:52)  Weight (kg): 77.1 (06-10-21 @ 09:52)  BMI (kg/m2): 25.1 (06-10-21 @ 09:52)  BSA (m2): 1.93 (06-10-21 @ 09:52)      06-10-21 @ 07:01  -  06-11-21 @ 07:00  --------------------------------------------------------  IN: 1400 mL / OUT: 3480 mL / NET: -2080 mL      Physical Exam:  	Gen NAD  	HEENT: no JVD  	Pulm: CTABL  	CV: S1S2,  	Abd: Soft, jose  	Ext:   - edema B/L LE   	Neuro: Awake and alert  	Skin: Warm and dry             LABS/STUDIES  --------------------------------------------------------------------------------              15.1   7.78  >-----------<  193      [06-11-21 @ 06:53]              44.8     Hemoglobin: 15.1 g/dL (06-11-21 @ 06:53)  Hemoglobin: 14.9 g/dL (06-10-21 @ 14:48)    Platelet Count - Automated: 193 K/uL (06-11-21 @ 06:53)  Platelet Count - Automated: 213 K/uL (06-10-21 @ 14:48)    140  |  99  |  41  ----------------------------<  115      [06-11-21 @ 06:53]  3.3   |  28  |  2.75        Ca     9.1     [06-11-21 @ 06:53]      Mg     1.9     [06-11-21 @ 06:53]      Phos  3.1     [06-11-21 @ 06:53]    TPro  7.7  /  Alb  3.1  /  TBili  0.3  /  DBili  x   /  AST  15  /  ALT  6   /  AlkPhos  99  [06-10-21 @ 04:16]          Creatinine, Serum: 2.75 mg/dL (06-11-21 @ 06:53)  Creatinine, Serum: 9.54 mg/dL (06-10-21 @ 14:48)  Creatinine, Serum: 14.71 mg/dL (06-10-21 @ 07:38)  Creatinine, Serum: 16.53 mg/dL (06-10-21 @ 06:29)  Creatinine, Serum: 16.25 mg/dL (06-10-21 @ 04:16)    SODIUM TREND:  Sodium 140 [06-11 @ 06:53]  Sodium 135 [06-10 @ 14:48]  Sodium 129 [06-10 @ 07:38]  Sodium 128 [06-10 @ 06:29]  Sodium 129 [06-10 @ 04:16]        SCr 2.75 [06-11 @ 06:53]  SCr 9.54 [06-10 @ 14:48]  SCr 14.71 [06-10 @ 07:38]  SCr 16.53 [06-10 @ 06:29]  SCr 16.25 [06-10 @ 04:16]    Urinalysis - [06-10-21 @ 07:38]      Color Light Orange / Appearance Turbid / SG 1.012 / pH 6.0      Gluc Negative / Ketone Negative  / Bili Negative / Urobili <2 mg/dL       Blood Moderate / Protein >600 mg/dL / Leuk Est Large / Nitrite Negative      RBC >50 / WBC >50 / Hyaline  / Gran  / Sq Epi  / Non Sq Epi  / Bacteria Moderate    Urine Sodium 34      [06-10-21 @ 14:48]  Urine Potassium 47.7      [06-10-21 @ 14:48]  Urine Chloride 20      [06-10-21 @ 14:48]  Urine Osmolality 345      [06-10-21 @ 14:48]    Lipid: chol 112, , HDL 36, LDL --      [06-11-21 @ 06:53]

## 2021-06-12 ENCOUNTER — TRANSCRIPTION ENCOUNTER (OUTPATIENT)
Age: 76
End: 2021-06-12

## 2021-06-12 LAB
ANION GAP SERPL CALC-SCNC: 10 MMOL/L — SIGNIFICANT CHANGE UP (ref 7–14)
BUN SERPL-MCNC: 16 MG/DL — SIGNIFICANT CHANGE UP (ref 7–23)
CALCIUM SERPL-MCNC: 9.8 MG/DL — SIGNIFICANT CHANGE UP (ref 8.4–10.5)
CHLORIDE SERPL-SCNC: 96 MMOL/L — LOW (ref 98–107)
CO2 SERPL-SCNC: 31 MMOL/L — SIGNIFICANT CHANGE UP (ref 22–31)
CREAT SERPL-MCNC: 1.08 MG/DL — SIGNIFICANT CHANGE UP (ref 0.5–1.3)
GLUCOSE SERPL-MCNC: 86 MG/DL — SIGNIFICANT CHANGE UP (ref 70–99)
HCT VFR BLD CALC: 46.1 % — SIGNIFICANT CHANGE UP (ref 39–50)
HGB BLD-MCNC: 14.5 G/DL — SIGNIFICANT CHANGE UP (ref 13–17)
MAGNESIUM SERPL-MCNC: 1.6 MG/DL — SIGNIFICANT CHANGE UP (ref 1.6–2.6)
MCHC RBC-ENTMCNC: 30 PG — SIGNIFICANT CHANGE UP (ref 27–34)
MCHC RBC-ENTMCNC: 31.5 GM/DL — LOW (ref 32–36)
MCV RBC AUTO: 95.2 FL — SIGNIFICANT CHANGE UP (ref 80–100)
NRBC # BLD: 0 /100 WBCS — SIGNIFICANT CHANGE UP
NRBC # FLD: 0 K/UL — SIGNIFICANT CHANGE UP
PHOSPHATE SERPL-MCNC: 2.9 MG/DL — SIGNIFICANT CHANGE UP (ref 2.5–4.5)
PLATELET # BLD AUTO: 207 K/UL — SIGNIFICANT CHANGE UP (ref 150–400)
POTASSIUM SERPL-MCNC: 3.6 MMOL/L — SIGNIFICANT CHANGE UP (ref 3.5–5.3)
POTASSIUM SERPL-SCNC: 3.6 MMOL/L — SIGNIFICANT CHANGE UP (ref 3.5–5.3)
RBC # BLD: 4.84 M/UL — SIGNIFICANT CHANGE UP (ref 4.2–5.8)
RBC # FLD: 14.1 % — SIGNIFICANT CHANGE UP (ref 10.3–14.5)
SODIUM SERPL-SCNC: 137 MMOL/L — SIGNIFICANT CHANGE UP (ref 135–145)
WBC # BLD: 8.75 K/UL — SIGNIFICANT CHANGE UP (ref 3.8–10.5)
WBC # FLD AUTO: 8.75 K/UL — SIGNIFICANT CHANGE UP (ref 3.8–10.5)

## 2021-06-12 PROCEDURE — 99232 SBSQ HOSP IP/OBS MODERATE 35: CPT

## 2021-06-12 RX ORDER — ONDANSETRON 8 MG/1
4 TABLET, FILM COATED ORAL ONCE
Refills: 0 | Status: COMPLETED | OUTPATIENT
Start: 2021-06-12 | End: 2021-06-12

## 2021-06-12 RX ORDER — ONDANSETRON 8 MG/1
4 TABLET, FILM COATED ORAL EVERY 8 HOURS
Refills: 0 | Status: DISCONTINUED | OUTPATIENT
Start: 2021-06-12 | End: 2021-06-13

## 2021-06-12 RX ADMIN — HYDROMORPHONE HYDROCHLORIDE 0.5 MILLIGRAM(S): 2 INJECTION INTRAMUSCULAR; INTRAVENOUS; SUBCUTANEOUS at 14:05

## 2021-06-12 RX ADMIN — ONDANSETRON 4 MILLIGRAM(S): 8 TABLET, FILM COATED ORAL at 23:07

## 2021-06-12 RX ADMIN — HYDROMORPHONE HYDROCHLORIDE 0.5 MILLIGRAM(S): 2 INJECTION INTRAMUSCULAR; INTRAVENOUS; SUBCUTANEOUS at 21:16

## 2021-06-12 RX ADMIN — ONDANSETRON 4 MILLIGRAM(S): 8 TABLET, FILM COATED ORAL at 11:26

## 2021-06-12 RX ADMIN — HYDROMORPHONE HYDROCHLORIDE 0.5 MILLIGRAM(S): 2 INJECTION INTRAMUSCULAR; INTRAVENOUS; SUBCUTANEOUS at 17:43

## 2021-06-12 RX ADMIN — METHADONE HYDROCHLORIDE 5 MILLIGRAM(S): 40 TABLET ORAL at 15:15

## 2021-06-12 RX ADMIN — HEPARIN SODIUM 5000 UNIT(S): 5000 INJECTION INTRAVENOUS; SUBCUTANEOUS at 15:16

## 2021-06-12 RX ADMIN — HYDROMORPHONE HYDROCHLORIDE 0.5 MILLIGRAM(S): 2 INJECTION INTRAMUSCULAR; INTRAVENOUS; SUBCUTANEOUS at 14:23

## 2021-06-12 RX ADMIN — HYDROMORPHONE HYDROCHLORIDE 0.5 MILLIGRAM(S): 2 INJECTION INTRAMUSCULAR; INTRAVENOUS; SUBCUTANEOUS at 09:18

## 2021-06-12 RX ADMIN — HYDROMORPHONE HYDROCHLORIDE 0.5 MILLIGRAM(S): 2 INJECTION INTRAMUSCULAR; INTRAVENOUS; SUBCUTANEOUS at 21:45

## 2021-06-12 RX ADMIN — POLYETHYLENE GLYCOL 3350 17 GRAM(S): 17 POWDER, FOR SOLUTION ORAL at 17:44

## 2021-06-12 RX ADMIN — HYDROMORPHONE HYDROCHLORIDE 0.5 MILLIGRAM(S): 2 INJECTION INTRAMUSCULAR; INTRAVENOUS; SUBCUTANEOUS at 06:01

## 2021-06-12 RX ADMIN — METHADONE HYDROCHLORIDE 5 MILLIGRAM(S): 40 TABLET ORAL at 08:23

## 2021-06-12 RX ADMIN — ONDANSETRON 4 MILLIGRAM(S): 8 TABLET, FILM COATED ORAL at 02:15

## 2021-06-12 RX ADMIN — HYDROMORPHONE HYDROCHLORIDE 0.5 MILLIGRAM(S): 2 INJECTION INTRAMUSCULAR; INTRAVENOUS; SUBCUTANEOUS at 03:05

## 2021-06-12 RX ADMIN — HYDROMORPHONE HYDROCHLORIDE 0.5 MILLIGRAM(S): 2 INJECTION INTRAMUSCULAR; INTRAVENOUS; SUBCUTANEOUS at 17:58

## 2021-06-12 RX ADMIN — POLYETHYLENE GLYCOL 3350 17 GRAM(S): 17 POWDER, FOR SOLUTION ORAL at 05:38

## 2021-06-12 RX ADMIN — SODIUM CHLORIDE 75 MILLILITER(S): 9 INJECTION, SOLUTION INTRAVENOUS at 00:26

## 2021-06-12 RX ADMIN — HEPARIN SODIUM 5000 UNIT(S): 5000 INJECTION INTRAVENOUS; SUBCUTANEOUS at 05:42

## 2021-06-12 RX ADMIN — CEFTRIAXONE 100 MILLIGRAM(S): 500 INJECTION, POWDER, FOR SOLUTION INTRAMUSCULAR; INTRAVENOUS at 06:43

## 2021-06-12 RX ADMIN — HYDROMORPHONE HYDROCHLORIDE 0.5 MILLIGRAM(S): 2 INJECTION INTRAMUSCULAR; INTRAVENOUS; SUBCUTANEOUS at 09:33

## 2021-06-12 RX ADMIN — SENNA PLUS 2 TABLET(S): 8.6 TABLET ORAL at 21:17

## 2021-06-12 RX ADMIN — HYDROMORPHONE HYDROCHLORIDE 0.5 MILLIGRAM(S): 2 INJECTION INTRAMUSCULAR; INTRAVENOUS; SUBCUTANEOUS at 05:51

## 2021-06-12 RX ADMIN — METHADONE HYDROCHLORIDE 10 MILLIGRAM(S): 40 TABLET ORAL at 21:16

## 2021-06-12 RX ADMIN — HEPARIN SODIUM 5000 UNIT(S): 5000 INJECTION INTRAVENOUS; SUBCUTANEOUS at 21:17

## 2021-06-12 RX ADMIN — HYDROMORPHONE HYDROCHLORIDE 0.5 MILLIGRAM(S): 2 INJECTION INTRAMUSCULAR; INTRAVENOUS; SUBCUTANEOUS at 02:49

## 2021-06-12 NOTE — PROGRESS NOTE ADULT - SUBJECTIVE AND OBJECTIVE BOX
Patient is a 76y old  Male who presents with a chief complaint of Renal failure and hyperkalemia in the setting of obstructive uropathy (11 Jun 2021 12:25)      SUBJECTIVE / OVERNIGHT EVENTS:      Patient seen and examined at bedside, reports chronic back pain stable, no dyspnea or CP. Plan for TOV tonight.     MEDICATIONS  (STANDING):  heparin   Injectable 5000 Unit(s) SubCutaneous every 8 hours  lactated ringers. 1000 milliLiter(s) (75 mL/Hr) IV Continuous <Continuous>  methadone    Tablet 5 milliGRAM(s) Oral <User Schedule>  methadone    Tablet 10 milliGRAM(s) Oral at bedtime  polyethylene glycol 3350 17 Gram(s) Oral two times a day  senna 2 Tablet(s) Oral at bedtime  sodium zirconium cyclosilicate 10 Gram(s) Oral once    MEDICATIONS  (PRN):  acetaminophen   Tablet .. 650 milliGRAM(s) Oral every 4 hours PRN Mild Pain (1 - 3)  HYDROmorphone  Injectable 0.5 milliGRAM(s) IV Push every 2 hours PRN Severe Pain (7 - 10)  lactulose Syrup 10 Gram(s) Oral four times a day PRN consitpation      Vital Signs Last 24 Hrs  T(C): 36.7 (12 Jun 2021 09:19), Max: 36.7 (12 Jun 2021 05:32)  T(F): 98 (12 Jun 2021 09:19), Max: 98.1 (12 Jun 2021 05:32)  HR: 62 (12 Jun 2021 09:19) (62 - 99)  BP: 130/83 (12 Jun 2021 09:19) (113/60 - 134/74)  BP(mean): --  RR: 16 (12 Jun 2021 09:19) (16 - 18)  SpO2: 100% (12 Jun 2021 09:19) (94% - 100%)  CAPILLARY BLOOD GLUCOSE        I&O's Summary    11 Jun 2021 07:01  -  12 Jun 2021 07:00  --------------------------------------------------------  IN: 1455 mL / OUT: 1675 mL / NET: -220 mL    12 Jun 2021 07:01  -  12 Jun 2021 14:51  --------------------------------------------------------  IN: 0 mL / OUT: 200 mL / NET: -200 mL        PHYSICAL EXAM:  Vital Signs Last 24 Hrs  T(C): 36.7 (06-12-21 @ 09:19)  T(F): 98 (06-12-21 @ 09:19), Max: 98.1 (06-12-21 @ 05:32)  HR: 62 (06-12-21 @ 09:19) (62 - 99)  BP: 130/83 (06-12-21 @ 09:19)  BP(mean): --  RR: 16 (06-12-21 @ 09:19) (16 - 18)  SpO2: 100% (06-12-21 @ 09:19) (94% - 100%)  Wt(kg): --    06-11 @ 07:01  -  06-12 @ 07:00  --------------------------------------------------------  IN: 1455 mL / OUT: 1675 mL / NET: -220 mL    06-12 @ 07:01  -  06-12 @ 14:51  --------------------------------------------------------  IN: 0 mL / OUT: 200 mL / NET: -200 mL      Constitutional: NAD, awake and alert  EYES: EOMI  ENT:  Normal Hearing, no tonsillar exudates   Neck: Soft and supple , no thyromegaly   Respiratory: Breath sounds are clear bilaterally, No wheezing, rales or rhonchi  Cardiovascular: S1 and S2, regular rate and rhythm, no Murmurs, gallops or rubs, no JVD,    Gastrointestinal: Bowel Sounds present, soft, nontender, nondistended, no guarding, no rebound  Extremities: No cyanosis or clubbing; warm to touch  Vascular: 2+ peripheral pulses lower ex  Neurological: No focal deficits, CN II-XII intact bilaterally, sensation to light touch intact in all extremities. Pupils are equally reactive to light and symmetrical in size.   Musculoskeletal: 5/5 strength b/l upper and lower extremities; no joint swelling.  Skin: No rashes  Psych: no depression or anhedonia, AAOx3  HEME: no bruises, no nose bleeds      LABS:                        14.5   8.75  )-----------( 207      ( 12 Jun 2021 07:21 )             46.1     06-12    137  |  96<L>  |  16  ----------------------------<  86  3.6   |  31  |  1.08    Ca    9.8      12 Jun 2021 07:21  Phos  2.9     06-12  Mg     1.6     06-12                RADIOLOGY & ADDITIONAL TESTS:    < from: CT Abdomen and Pelvis No Cont (06.10.21 @ 06:36) >  IMPRESSION:  Interval progression in degree of intrapulmonary and intra-abdominal primary and metastatic disease from known colon cancer.    Leos catheter balloon inflated in the prostatic urethra, needs readjustment. Underdistended bladder with air secondary to instrumentation.    No bowel obstruction. Redemonstration of masslike thickening at the level of the recto anal region consistent with perirectal/perianal fistulous disease, cannot exclude underlying masses from known colon cancer.    Mild left hydroureteronephrosis with mild left perinephric stranding without obstructing calculus. No renal calculi.    Enlarged seminal vesicles, may be inflamed. Recommend clinical correlation.    Lateral wall thickening suggestive of cystitis. Intraluminal bladder air, likely iatrogenic.    These findings were discussed with Dr. Morris at 6/10/2021 7:11 AM by  with read back confirmation.    < end of copied text >      Imaging Personally Reviewed:    Consultant(s) Notes Reviewed:      Care Discussed with Consultants/Other Providers:

## 2021-06-12 NOTE — PROGRESS NOTE ADULT - PROBLEM SELECTOR PLAN 1
- secondary to obstructive uropathy  - Patient with ~1 month of urinary hesitancy, dysuria, and decreased urine output as well as abdominal pain now noted to have a Cr of ~16 now downtrended 1.08 s/p garcia placement   - Renal US LT hydro  - plan for TOV tonight   - palliative recs appreciated- pain meds Oxy IR 5-10 q4 PRN breakthru pain and methadone methadone 5mg, 5mg, 10mg

## 2021-06-12 NOTE — PROGRESS NOTE ADULT - ASSESSMENT
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

## 2021-06-13 LAB
ANION GAP SERPL CALC-SCNC: 14 MMOL/L — SIGNIFICANT CHANGE UP (ref 7–14)
BUN SERPL-MCNC: 12 MG/DL — SIGNIFICANT CHANGE UP (ref 7–23)
CALCIUM SERPL-MCNC: 9.3 MG/DL — SIGNIFICANT CHANGE UP (ref 8.4–10.5)
CHLORIDE SERPL-SCNC: 98 MMOL/L — SIGNIFICANT CHANGE UP (ref 98–107)
CO2 SERPL-SCNC: 26 MMOL/L — SIGNIFICANT CHANGE UP (ref 22–31)
CREAT SERPL-MCNC: 0.86 MG/DL — SIGNIFICANT CHANGE UP (ref 0.5–1.3)
GLUCOSE SERPL-MCNC: 94 MG/DL — SIGNIFICANT CHANGE UP (ref 70–99)
HCT VFR BLD CALC: 44.4 % — SIGNIFICANT CHANGE UP (ref 39–50)
HGB BLD-MCNC: 14.5 G/DL — SIGNIFICANT CHANGE UP (ref 13–17)
MAGNESIUM SERPL-MCNC: 1.4 MG/DL — LOW (ref 1.6–2.6)
MCHC RBC-ENTMCNC: 30.7 PG — SIGNIFICANT CHANGE UP (ref 27–34)
MCHC RBC-ENTMCNC: 32.7 GM/DL — SIGNIFICANT CHANGE UP (ref 32–36)
MCV RBC AUTO: 93.9 FL — SIGNIFICANT CHANGE UP (ref 80–100)
NRBC # BLD: 0 /100 WBCS — SIGNIFICANT CHANGE UP
NRBC # FLD: 0 K/UL — SIGNIFICANT CHANGE UP
PHOSPHATE SERPL-MCNC: 3.4 MG/DL — SIGNIFICANT CHANGE UP (ref 2.5–4.5)
PLATELET # BLD AUTO: 186 K/UL — SIGNIFICANT CHANGE UP (ref 150–400)
POTASSIUM SERPL-MCNC: 3.2 MMOL/L — LOW (ref 3.5–5.3)
POTASSIUM SERPL-SCNC: 3.2 MMOL/L — LOW (ref 3.5–5.3)
RBC # BLD: 4.73 M/UL — SIGNIFICANT CHANGE UP (ref 4.2–5.8)
RBC # FLD: 13.8 % — SIGNIFICANT CHANGE UP (ref 10.3–14.5)
SODIUM SERPL-SCNC: 138 MMOL/L — SIGNIFICANT CHANGE UP (ref 135–145)
WBC # BLD: 7.96 K/UL — SIGNIFICANT CHANGE UP (ref 3.8–10.5)
WBC # FLD AUTO: 7.96 K/UL — SIGNIFICANT CHANGE UP (ref 3.8–10.5)

## 2021-06-13 PROCEDURE — 51040 INCISE & DRAIN BLADDER: CPT

## 2021-06-13 PROCEDURE — 51600 INJECTION FOR BLADDER X-RAY: CPT | Mod: 59

## 2021-06-13 PROCEDURE — 99232 SBSQ HOSP IP/OBS MODERATE 35: CPT

## 2021-06-13 RX ORDER — POTASSIUM CHLORIDE 20 MEQ
10 PACKET (EA) ORAL
Refills: 0 | Status: COMPLETED | OUTPATIENT
Start: 2021-06-13 | End: 2021-06-13

## 2021-06-13 RX ORDER — HYDROMORPHONE HYDROCHLORIDE 2 MG/ML
0.5 INJECTION INTRAMUSCULAR; INTRAVENOUS; SUBCUTANEOUS ONCE
Refills: 0 | Status: DISCONTINUED | OUTPATIENT
Start: 2021-06-13 | End: 2021-06-13

## 2021-06-13 RX ORDER — MAGNESIUM SULFATE 500 MG/ML
1 VIAL (ML) INJECTION ONCE
Refills: 0 | Status: COMPLETED | OUTPATIENT
Start: 2021-06-13 | End: 2021-06-13

## 2021-06-13 RX ORDER — LIDOCAINE HCL 20 MG/ML
5 VIAL (ML) INJECTION ONCE
Refills: 0 | Status: COMPLETED | OUTPATIENT
Start: 2021-06-13 | End: 2021-06-13

## 2021-06-13 RX ORDER — CEFTRIAXONE 500 MG/1
1000 INJECTION, POWDER, FOR SOLUTION INTRAMUSCULAR; INTRAVENOUS EVERY 24 HOURS
Refills: 0 | Status: DISCONTINUED | OUTPATIENT
Start: 2021-06-13 | End: 2021-06-15

## 2021-06-13 RX ORDER — POTASSIUM CHLORIDE 20 MEQ
40 PACKET (EA) ORAL ONCE
Refills: 0 | Status: COMPLETED | OUTPATIENT
Start: 2021-06-13 | End: 2021-06-13

## 2021-06-13 RX ORDER — ONDANSETRON 8 MG/1
4 TABLET, FILM COATED ORAL EVERY 6 HOURS
Refills: 0 | Status: DISCONTINUED | OUTPATIENT
Start: 2021-06-13 | End: 2021-06-15

## 2021-06-13 RX ADMIN — HYDROMORPHONE HYDROCHLORIDE 0.5 MILLIGRAM(S): 2 INJECTION INTRAMUSCULAR; INTRAVENOUS; SUBCUTANEOUS at 19:55

## 2021-06-13 RX ADMIN — HYDROMORPHONE HYDROCHLORIDE 0.5 MILLIGRAM(S): 2 INJECTION INTRAMUSCULAR; INTRAVENOUS; SUBCUTANEOUS at 05:28

## 2021-06-13 RX ADMIN — HYDROMORPHONE HYDROCHLORIDE 0.5 MILLIGRAM(S): 2 INJECTION INTRAMUSCULAR; INTRAVENOUS; SUBCUTANEOUS at 05:13

## 2021-06-13 RX ADMIN — Medication 5 MILLILITER(S): at 14:13

## 2021-06-13 RX ADMIN — HYDROMORPHONE HYDROCHLORIDE 0.5 MILLIGRAM(S): 2 INJECTION INTRAMUSCULAR; INTRAVENOUS; SUBCUTANEOUS at 09:18

## 2021-06-13 RX ADMIN — HYDROMORPHONE HYDROCHLORIDE 0.5 MILLIGRAM(S): 2 INJECTION INTRAMUSCULAR; INTRAVENOUS; SUBCUTANEOUS at 02:10

## 2021-06-13 RX ADMIN — HYDROMORPHONE HYDROCHLORIDE 0.5 MILLIGRAM(S): 2 INJECTION INTRAMUSCULAR; INTRAVENOUS; SUBCUTANEOUS at 17:34

## 2021-06-13 RX ADMIN — HYDROMORPHONE HYDROCHLORIDE 0.5 MILLIGRAM(S): 2 INJECTION INTRAMUSCULAR; INTRAVENOUS; SUBCUTANEOUS at 15:05

## 2021-06-13 RX ADMIN — HYDROMORPHONE HYDROCHLORIDE 0.5 MILLIGRAM(S): 2 INJECTION INTRAMUSCULAR; INTRAVENOUS; SUBCUTANEOUS at 02:25

## 2021-06-13 RX ADMIN — METHADONE HYDROCHLORIDE 5 MILLIGRAM(S): 40 TABLET ORAL at 14:00

## 2021-06-13 RX ADMIN — POLYETHYLENE GLYCOL 3350 17 GRAM(S): 17 POWDER, FOR SOLUTION ORAL at 05:13

## 2021-06-13 RX ADMIN — Medication 100 MILLIEQUIVALENT(S): at 15:06

## 2021-06-13 RX ADMIN — HYDROMORPHONE HYDROCHLORIDE 0.5 MILLIGRAM(S): 2 INJECTION INTRAMUSCULAR; INTRAVENOUS; SUBCUTANEOUS at 19:40

## 2021-06-13 RX ADMIN — ONDANSETRON 4 MILLIGRAM(S): 8 TABLET, FILM COATED ORAL at 10:11

## 2021-06-13 RX ADMIN — ONDANSETRON 4 MILLIGRAM(S): 8 TABLET, FILM COATED ORAL at 16:21

## 2021-06-13 RX ADMIN — Medication 5 MILLILITER(S): at 16:21

## 2021-06-13 RX ADMIN — Medication 100 MILLIEQUIVALENT(S): at 12:54

## 2021-06-13 RX ADMIN — HYDROMORPHONE HYDROCHLORIDE 0.5 MILLIGRAM(S): 2 INJECTION INTRAMUSCULAR; INTRAVENOUS; SUBCUTANEOUS at 12:54

## 2021-06-13 RX ADMIN — METHADONE HYDROCHLORIDE 5 MILLIGRAM(S): 40 TABLET ORAL at 07:47

## 2021-06-13 RX ADMIN — HEPARIN SODIUM 5000 UNIT(S): 5000 INJECTION INTRAVENOUS; SUBCUTANEOUS at 14:00

## 2021-06-13 RX ADMIN — HEPARIN SODIUM 5000 UNIT(S): 5000 INJECTION INTRAVENOUS; SUBCUTANEOUS at 05:13

## 2021-06-13 RX ADMIN — HYDROMORPHONE HYDROCHLORIDE 0.5 MILLIGRAM(S): 2 INJECTION INTRAMUSCULAR; INTRAVENOUS; SUBCUTANEOUS at 17:19

## 2021-06-13 RX ADMIN — HYDROMORPHONE HYDROCHLORIDE 0.5 MILLIGRAM(S): 2 INJECTION INTRAMUSCULAR; INTRAVENOUS; SUBCUTANEOUS at 18:33

## 2021-06-13 RX ADMIN — HYDROMORPHONE HYDROCHLORIDE 0.5 MILLIGRAM(S): 2 INJECTION INTRAMUSCULAR; INTRAVENOUS; SUBCUTANEOUS at 09:33

## 2021-06-13 RX ADMIN — HYDROMORPHONE HYDROCHLORIDE 0.5 MILLIGRAM(S): 2 INJECTION INTRAMUSCULAR; INTRAVENOUS; SUBCUTANEOUS at 18:48

## 2021-06-13 RX ADMIN — HYDROMORPHONE HYDROCHLORIDE 0.5 MILLIGRAM(S): 2 INJECTION INTRAMUSCULAR; INTRAVENOUS; SUBCUTANEOUS at 15:20

## 2021-06-13 RX ADMIN — Medication 100 MILLIEQUIVALENT(S): at 14:00

## 2021-06-13 RX ADMIN — CEFTRIAXONE 100 MILLIGRAM(S): 500 INJECTION, POWDER, FOR SOLUTION INTRAMUSCULAR; INTRAVENOUS at 17:19

## 2021-06-13 RX ADMIN — Medication 100 GRAM(S): at 10:11

## 2021-06-13 RX ADMIN — HYDROMORPHONE HYDROCHLORIDE 0.5 MILLIGRAM(S): 2 INJECTION INTRAMUSCULAR; INTRAVENOUS; SUBCUTANEOUS at 13:09

## 2021-06-13 NOTE — PROGRESS NOTE ADULT - SUBJECTIVE AND OBJECTIVE BOX
Patient is a 76y old  Male who presents with a chief complaint of Renal failure and hyperkalemia in the setting of obstructive uropathy (12 Jun 2021 14:50)      SUBJECTIVE / OVERNIGHT EVENTS:    Patient seen and examined at bedside, failed TOV overnight, pain otherwise stable.    MEDICATIONS  (STANDING):  heparin   Injectable 5000 Unit(s) SubCutaneous every 8 hours  lactated ringers. 1000 milliLiter(s) (75 mL/Hr) IV Continuous <Continuous>  methadone    Tablet 5 milliGRAM(s) Oral <User Schedule>  methadone    Tablet 10 milliGRAM(s) Oral at bedtime  polyethylene glycol 3350 17 Gram(s) Oral two times a day  potassium chloride  10 mEq/100 mL IVPB 10 milliEquivalent(s) IV Intermittent every 1 hour  senna 2 Tablet(s) Oral at bedtime  sodium zirconium cyclosilicate 10 Gram(s) Oral once    MEDICATIONS  (PRN):  acetaminophen   Tablet .. 650 milliGRAM(s) Oral every 4 hours PRN Mild Pain (1 - 3)  HYDROmorphone  Injectable 0.5 milliGRAM(s) IV Push every 2 hours PRN Severe Pain (7 - 10)  lactulose Syrup 10 Gram(s) Oral four times a day PRN consitpation  ondansetron Injectable 4 milliGRAM(s) IV Push every 6 hours PRN Nausea and/or Vomiting      Vital Signs Last 24 Hrs  T(C): 36.2 (13 Jun 2021 14:17), Max: 36.9 (12 Jun 2021 17:43)  T(F): 97.1 (13 Jun 2021 14:17), Max: 98.5 (12 Jun 2021 17:43)  HR: 65 (13 Jun 2021 14:17) (60 - 83)  BP: 128/75 (13 Jun 2021 14:17) (116/67 - 143/87)  BP(mean): --  RR: 18 (13 Jun 2021 14:17) (16 - 18)  SpO2: 98% (13 Jun 2021 14:17) (93% - 100%)  CAPILLARY BLOOD GLUCOSE      POCT Blood Glucose.: 116 mg/dL (12 Jun 2021 21:28)    I&O's Summary    12 Jun 2021 07:01  -  13 Jun 2021 07:00  --------------------------------------------------------  IN: 240 mL / OUT: 850 mL / NET: -610 mL    13 Jun 2021 07:01  -  13 Jun 2021 14:30  --------------------------------------------------------  IN: 218 mL / OUT: 0 mL / NET: 218 mL        PHYSICAL EXAM:  Vital Signs Last 24 Hrs  T(C): 36.2 (06-13-21 @ 14:17)  T(F): 97.1 (06-13-21 @ 14:17), Max: 98.5 (06-12-21 @ 17:43)  HR: 65 (06-13-21 @ 14:17) (60 - 83)  BP: 128/75 (06-13-21 @ 14:17)  BP(mean): --  RR: 18 (06-13-21 @ 14:17) (16 - 18)  SpO2: 98% (06-13-21 @ 14:17) (93% - 100%)  Wt(kg): --    06-12 @ 07:01  -  06-13 @ 07:00  --------------------------------------------------------  IN: 240 mL / OUT: 850 mL / NET: -610 mL    06-13 @ 07:01  -  06-13 @ 14:30  --------------------------------------------------------  IN: 218 mL / OUT: 0 mL / NET: 218 mL      Constitutional: NAD, awake and alert  EYES: EOMI  ENT:  Normal Hearing, no tonsillar exudates    Respiratory: Breath sounds are clear bilaterally, No wheezing, rales or rhonchi  Cardiovascular: S1 and S2, regular rate and rhythm, no Murmurs, gallops or rubs, no JVD,    Gastrointestinal: Bowel Sounds present, soft, nontender, nondistended, no guarding, no rebound  Extremities: No cyanosis or clubbing; warm to touch  Neurological: No focal deficits, CN II-XII intact bilaterally, sensation to light touch intact in all extremities. Pupils are equally reactive to light and symmetrical in size.   Musculoskeletal: 5/5 strength b/l upper and lower extremities; no joint swelling.  Psych: AAOx3  HEME: no bruises, no nose bleeds      LABS:                        14.5   7.96  )-----------( 186      ( 13 Jun 2021 07:13 )             44.4     06-13    138  |  98  |  12  ----------------------------<  94  3.2<L>   |  26  |  0.86    Ca    9.3      13 Jun 2021 07:13  Phos  3.4     06-13  Mg     1.4     06-13                RADIOLOGY & ADDITIONAL TESTS:    Imaging Personally Reviewed:    Consultant(s) Notes Reviewed:      Care Discussed with Consultants/Other Providers:

## 2021-06-13 NOTE — PROVIDER CONTACT NOTE (OTHER) - REASON
refusing lactulose and miralax
bladder scan 511
patient refused lactulose
Burst of PAT on telemetry monitor
bladder scan 285

## 2021-06-13 NOTE — PROVIDER CONTACT NOTE (OTHER) - ASSESSMENT
patient A&Ox4, Vital signs within normal limits, no distress noted at this time
Patient asymptomatic and was sleeping during event
Patient is A&OX4 Patient refusing lactulose and Miralax. Patient educated on risk and benefits. No output in colostomy this shift
VSS, bladder scan 285. has not voided since garcia removal
bladder scan 51

## 2021-06-13 NOTE — PROVIDER CONTACT NOTE (OTHER) - BACKGROUND
admitted for ALPHONSE, hyperkalemia and abdominal pain. Patient has stage 4 colon cancer mets to lungs and stomach. Patient has LUQ colostomy
Patient admitted for abdominal pain, renal failure and hyperkalemia. Patient has stage 4 colon cancer mets to lungs and stomach.
admitted for ALPHONSE, hyperkalemia and abdominal pain. Patient has stage 4 colon cancer mets to lungs and stomach. Patient has LUQ colostomy
colon ca
rectal

## 2021-06-13 NOTE — PROGRESS NOTE ADULT - ASSESSMENT
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

## 2021-06-13 NOTE — PROVIDER CONTACT NOTE (OTHER) - ACTION/TREATMENT ORDERED:
garcia cath
no interventions at this time
ACP made aware. Will continue to monitor.
ACP made aware. Will continue to monitor.
continue to monitor

## 2021-06-13 NOTE — PROGRESS NOTE ADULT - PROBLEM SELECTOR PLAN 1
- secondary to obstructive uropathy  - Patient with ~1 month of urinary hesitancy, dysuria, and decreased urine output as well as abdominal pain now noted to have a Cr of ~16 now downtrended 1.08 s/p garcia placement   - Renal US LT hydro  - failed TOV 6/12 night, reinserted garcia 6/13  - palliative recs appreciated- pain meds Oxy IR 5-10 q4 PRN breakthru pain and methadone methadone 5mg, 5mg, 10mg

## 2021-06-14 LAB
ANION GAP SERPL CALC-SCNC: 11 MMOL/L — SIGNIFICANT CHANGE UP (ref 7–14)
BUN SERPL-MCNC: 13 MG/DL — SIGNIFICANT CHANGE UP (ref 7–23)
CALCIUM SERPL-MCNC: 8.7 MG/DL — SIGNIFICANT CHANGE UP (ref 8.4–10.5)
CHLORIDE SERPL-SCNC: 100 MMOL/L — SIGNIFICANT CHANGE UP (ref 98–107)
CO2 SERPL-SCNC: 28 MMOL/L — SIGNIFICANT CHANGE UP (ref 22–31)
CREAT SERPL-MCNC: 1.07 MG/DL — SIGNIFICANT CHANGE UP (ref 0.5–1.3)
GLUCOSE SERPL-MCNC: 143 MG/DL — HIGH (ref 70–99)
HCT VFR BLD CALC: 47.6 % — SIGNIFICANT CHANGE UP (ref 39–50)
HGB BLD-MCNC: 15.5 G/DL — SIGNIFICANT CHANGE UP (ref 13–17)
MAGNESIUM SERPL-MCNC: 1.6 MG/DL — SIGNIFICANT CHANGE UP (ref 1.6–2.6)
MCHC RBC-ENTMCNC: 30.3 PG — SIGNIFICANT CHANGE UP (ref 27–34)
MCHC RBC-ENTMCNC: 32.6 GM/DL — SIGNIFICANT CHANGE UP (ref 32–36)
MCV RBC AUTO: 93 FL — SIGNIFICANT CHANGE UP (ref 80–100)
NRBC # BLD: 0 /100 WBCS — SIGNIFICANT CHANGE UP
NRBC # FLD: 0 K/UL — SIGNIFICANT CHANGE UP
PHOSPHATE SERPL-MCNC: 4 MG/DL — SIGNIFICANT CHANGE UP (ref 2.5–4.5)
PLATELET # BLD AUTO: 196 K/UL — SIGNIFICANT CHANGE UP (ref 150–400)
POTASSIUM SERPL-MCNC: 4 MMOL/L — SIGNIFICANT CHANGE UP (ref 3.5–5.3)
POTASSIUM SERPL-SCNC: 4 MMOL/L — SIGNIFICANT CHANGE UP (ref 3.5–5.3)
RBC # BLD: 5.12 M/UL — SIGNIFICANT CHANGE UP (ref 4.2–5.8)
RBC # FLD: 13.9 % — SIGNIFICANT CHANGE UP (ref 10.3–14.5)
SODIUM SERPL-SCNC: 139 MMOL/L — SIGNIFICANT CHANGE UP (ref 135–145)
WBC # BLD: 13.84 K/UL — HIGH (ref 3.8–10.5)
WBC # FLD AUTO: 13.84 K/UL — HIGH (ref 3.8–10.5)

## 2021-06-14 PROCEDURE — 99232 SBSQ HOSP IP/OBS MODERATE 35: CPT

## 2021-06-14 PROCEDURE — 99232 SBSQ HOSP IP/OBS MODERATE 35: CPT | Mod: GC

## 2021-06-14 RX ORDER — HYDROMORPHONE HYDROCHLORIDE 2 MG/ML
2 INJECTION INTRAMUSCULAR; INTRAVENOUS; SUBCUTANEOUS EVERY 4 HOURS
Refills: 0 | Status: DISCONTINUED | OUTPATIENT
Start: 2021-06-14 | End: 2021-06-15

## 2021-06-14 RX ORDER — ACETAMINOPHEN 500 MG
650 TABLET ORAL ONCE
Refills: 0 | Status: DISCONTINUED | OUTPATIENT
Start: 2021-06-14 | End: 2021-06-14

## 2021-06-14 RX ORDER — FENTANYL CITRATE 50 UG/ML
25 INJECTION INTRAVENOUS
Refills: 0 | Status: DISCONTINUED | OUTPATIENT
Start: 2021-06-14 | End: 2021-06-14

## 2021-06-14 RX ORDER — METOCLOPRAMIDE HCL 10 MG
10 TABLET ORAL ONCE
Refills: 0 | Status: DISCONTINUED | OUTPATIENT
Start: 2021-06-14 | End: 2021-06-14

## 2021-06-14 RX ORDER — HYDROMORPHONE HYDROCHLORIDE 2 MG/ML
4 INJECTION INTRAMUSCULAR; INTRAVENOUS; SUBCUTANEOUS EVERY 4 HOURS
Refills: 0 | Status: DISCONTINUED | OUTPATIENT
Start: 2021-06-14 | End: 2021-06-15

## 2021-06-14 RX ORDER — ONDANSETRON 8 MG/1
4 TABLET, FILM COATED ORAL ONCE
Refills: 0 | Status: DISCONTINUED | OUTPATIENT
Start: 2021-06-14 | End: 2021-06-14

## 2021-06-14 RX ADMIN — HYDROMORPHONE HYDROCHLORIDE 0.5 MILLIGRAM(S): 2 INJECTION INTRAMUSCULAR; INTRAVENOUS; SUBCUTANEOUS at 09:26

## 2021-06-14 RX ADMIN — HYDROMORPHONE HYDROCHLORIDE 0.5 MILLIGRAM(S): 2 INJECTION INTRAMUSCULAR; INTRAVENOUS; SUBCUTANEOUS at 09:41

## 2021-06-14 RX ADMIN — METHADONE HYDROCHLORIDE 5 MILLIGRAM(S): 40 TABLET ORAL at 13:54

## 2021-06-14 RX ADMIN — SENNA PLUS 2 TABLET(S): 8.6 TABLET ORAL at 20:34

## 2021-06-14 RX ADMIN — METHADONE HYDROCHLORIDE 5 MILLIGRAM(S): 40 TABLET ORAL at 07:01

## 2021-06-14 RX ADMIN — HYDROMORPHONE HYDROCHLORIDE 4 MILLIGRAM(S): 2 INJECTION INTRAMUSCULAR; INTRAVENOUS; SUBCUTANEOUS at 21:05

## 2021-06-14 RX ADMIN — METHADONE HYDROCHLORIDE 10 MILLIGRAM(S): 40 TABLET ORAL at 20:33

## 2021-06-14 RX ADMIN — HYDROMORPHONE HYDROCHLORIDE 4 MILLIGRAM(S): 2 INJECTION INTRAMUSCULAR; INTRAVENOUS; SUBCUTANEOUS at 14:39

## 2021-06-14 RX ADMIN — CEFTRIAXONE 100 MILLIGRAM(S): 500 INJECTION, POWDER, FOR SOLUTION INTRAMUSCULAR; INTRAVENOUS at 17:55

## 2021-06-14 RX ADMIN — HYDROMORPHONE HYDROCHLORIDE 0.5 MILLIGRAM(S): 2 INJECTION INTRAMUSCULAR; INTRAVENOUS; SUBCUTANEOUS at 01:54

## 2021-06-14 RX ADMIN — HYDROMORPHONE HYDROCHLORIDE 0.5 MILLIGRAM(S): 2 INJECTION INTRAMUSCULAR; INTRAVENOUS; SUBCUTANEOUS at 12:31

## 2021-06-14 RX ADMIN — HYDROMORPHONE HYDROCHLORIDE 4 MILLIGRAM(S): 2 INJECTION INTRAMUSCULAR; INTRAVENOUS; SUBCUTANEOUS at 20:34

## 2021-06-14 RX ADMIN — HYDROMORPHONE HYDROCHLORIDE 0.5 MILLIGRAM(S): 2 INJECTION INTRAMUSCULAR; INTRAVENOUS; SUBCUTANEOUS at 07:07

## 2021-06-14 RX ADMIN — ONDANSETRON 4 MILLIGRAM(S): 8 TABLET, FILM COATED ORAL at 01:49

## 2021-06-14 RX ADMIN — ONDANSETRON 4 MILLIGRAM(S): 8 TABLET, FILM COATED ORAL at 09:25

## 2021-06-14 RX ADMIN — POLYETHYLENE GLYCOL 3350 17 GRAM(S): 17 POWDER, FOR SOLUTION ORAL at 07:01

## 2021-06-14 RX ADMIN — HYDROMORPHONE HYDROCHLORIDE 0.5 MILLIGRAM(S): 2 INJECTION INTRAMUSCULAR; INTRAVENOUS; SUBCUTANEOUS at 12:46

## 2021-06-14 RX ADMIN — HYDROMORPHONE HYDROCHLORIDE 4 MILLIGRAM(S): 2 INJECTION INTRAMUSCULAR; INTRAVENOUS; SUBCUTANEOUS at 13:54

## 2021-06-14 RX ADMIN — HYDROMORPHONE HYDROCHLORIDE 0.5 MILLIGRAM(S): 2 INJECTION INTRAMUSCULAR; INTRAVENOUS; SUBCUTANEOUS at 02:24

## 2021-06-14 NOTE — DISCHARGE NOTE PROVIDER - HOSPITAL COURSE
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    Hospital course:     Renal failure, unspecified chronicity.    - secondary to obstructive uropathy  - Patient with ~1 month of urinary hesitancy, dysuria, and decreased urine output as well as abdominal pain now noted to have a Cr of ~16 on admission   - Renal US with left hydro  - S/p garcia placement with improvement/normalization of creatinine   - Pt failed TOV and garcia inserted in false lumen and subsequently taken to OR for suprapubic cath   - Palliative care consulted and assisted with pain control. Pain controlled prior to discharge     Electrolyte abnormality.    - Noted to have hyperkalemia to 6.8 without EKG changes in setting of acute renal failure on admission   - S/p tele monitoring  - Electrolytes normalized     Pyuria.    - UA w/ large leuk and neg nitrites, but ucx negative   - Elevated WBC suspect likely due to procedure, repeat UCx ----   - Ceftriaxone    Rectal adenocarcinoma.    - Oncology consulted   - Palliative consulted: plan for home hospice   - MOLST in chart.     Pt medically stable for discharge on _____ as per discussion with ____.  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    Hospital course:     Renal failure, unspecified chronicity.    - secondary to obstructive uropathy  - Patient with ~1 month of urinary hesitancy, dysuria, and decreased urine output as well as abdominal pain now noted to have a Cr of ~16 on admission   - Renal US with left hydro  - S/p garcia placement with improvement/normalization of creatinine   - Pt failed TOV and garcia inserted in false lumen and subsequently taken to OR for suprapubic cath   - Palliative care consulted and assisted with pain control. Pain controlled prior to discharge     Electrolyte abnormality.    - Noted to have hyperkalemia to 6.8 without EKG changes in setting of acute renal failure on admission   - S/p tele monitoring  - Electrolytes normalized     Pyuria.    - UA w/ large leuk and neg nitrites, but ucx negative   - Elevated WBC suspect likely due to procedure, repeat UCx sent  - Ceftriaxone, completed course during hospitalization     Rectal adenocarcinoma.    - Oncology consulted   - Palliative consulted: plan for home hospice   - MOLST in chart.     Pt medically stable for discharge on 6/15/21 as per discussion with Dr. Whipple.  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    Hospital course:     Renal failure, unspecified chronicity.    - secondary to obstructive uropathy  - Patient with ~1 month of urinary hesitancy, dysuria, and decreased urine output as well as abdominal pain now noted to have a Cr of ~16 on admission   - Renal US with left hydro  - S/p garcia placement with improvement/normalization of creatinine   - Pt failed TOV and garcia inserted in false lumen and subsequently taken to OR for suprapubic cath   - Palliative care consulted and assisted with pain control. Pain controlled prior to discharge     Electrolyte abnormality.    - Noted to have hyperkalemia to 6.8 without EKG changes in setting of acute renal failure on admission   - S/p tele monitoring  - Electrolytes normalized     Pyuria.    - UA w/ large leuk and neg nitrites, but ucx negative   - Elevated WBC suspect likely due to procedure, repeat UCx sent in OR; negative  - Ceftriaxone, completed course during hospitalization     Rectal adenocarcinoma.    - Oncology consulted   - Palliative consulted: plan for home hospice   - MOLST in chart.     Pt medically stable for discharge on 6/15/21 as per discussion with Dr. Whipple.  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    Hospital course:     Renal failure, unspecified chronicity.      - secondary to obstructive uropathy  - Patient with ~1 month of urinary hesitancy, dysuria, and decreased urine output as well as abdominal pain now noted to have a Cr of ~16 on admission   - Renal US with left hydro  - S/p garcia placement with improvement/normalization of creatinine   - Pt failed TOV and garcia inserted in false lumen and subsequently taken to OR for suprapubic cath   - Palliative care consulted and assisted with pain control. Pain controlled prior to discharge     Electrolyte abnormality.    - Noted to have hyperkalemia to 6.8 without EKG changes in setting of acute renal failure on admission   - S/p tele monitoring  - Electrolytes normalized     Pyuria.    - UA w/ large leuk and neg nitrites, but ucx negative   - Elevated WBC suspect likely due to procedure, repeat UCx sent in OR; negative  - Ceftriaxone, completed course during hospitalization     Rectal adenocarcinoma.    - Oncology consulted   - Palliative consulted: plan for home hospice   - MOLST in chart.     Pt medically stable for discharge on 6/15/21 as per discussion with Dr. Whipple.  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    Hospital course:     Renal failure, unspecified chronicity.      - secondary to obstructive uropathy  - Patient with ~1 month of urinary hesitancy, dysuria, and decreased urine output as well as abdominal pain now noted to have a Cr of ~16 on admission   - Renal US with left hydro  - S/p garcia placement with improvement/normalization of creatinine   - Pt failed TOV and garcia inserted in false lumen and subsequently taken to OR for suprapubic cath   - Palliative care consulted and assisted with pain control. Pain controlled prior to discharge     Electrolyte abnormality.    - Noted to have hyperkalemia to 6.8 without EKG changes in setting of acute renal failure on admission   - S/p tele monitoring  - Electrolytes normalized     Pyuria.    - UA w/ large leuk and neg nitrites, but ucx negative   - Ceftriaxone, completed course during hospitalization   - Elevated WBC suspect likely due to procedure, repeat UCx sent in OR; negative      Rectal adenocarcinoma.    - Oncology consulted no further treatment   - Palliative consulted: plan for home hospice   - MOLST in chart.     Pt medically stable for discharge on 6/15/21 as per discussion with Dr. Whipple.  Dispo: home w/ home hospice

## 2021-06-14 NOTE — PROGRESS NOTE ADULT - SUBJECTIVE AND OBJECTIVE BOX
Subjective  Patient feels that he has to urinate. Catheter is draining well    Objective    Vital signs  T(F): , Max: 98.7 (06-13-21 @ 21:21)  HR: 100 (06-14-21 @ 01:15)  BP: 134/85 (06-14-21 @ 01:15)  SpO2: 96% (06-14-21 @ 01:15)  Wt(kg): --    Output     06-12 @ 07:01  -  06-13 @ 07:00  --------------------------------------------------------  IN: 240 mL / OUT: 850 mL / NET: -610 mL    06-13 @ 07:01  -  06-14 @ 01:27  --------------------------------------------------------  IN: 218 mL / OUT: 100 mL / NET: 118 mL        Gen: Np distress observed  Abd: soft, + SPT draining light pink urine with sediments. + ostomy bag    Labs      06-13 @ 07:13    WBC 7.96  / Hct 44.4  / SCr 0.86     06-12 @ 07:21    WBC 8.75  / Hct 46.1  / SCr 1.08       Urine Cx: ?  Blood Cx: ?    Imaging

## 2021-06-14 NOTE — PROGRESS NOTE ADULT - ASSESSMENT
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

## 2021-06-14 NOTE — DISCHARGE NOTE PROVIDER - CARE PROVIDER_API CALL
Urology,   [facility]  [address], [address]  [place], [address]  [phone]  Phone: (   )    -  Fax: (   )    -  Follow Up Time:

## 2021-06-14 NOTE — PROGRESS NOTE ADULT - ASSESSMENT
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 Found to be in renal failure and progression of disease on imaging. Oncology consulted for further recommendations.     #Obstructive Uropathy with Acute Kidney Failure   - Only baseline creatine is from August 2020 which was normal 0.72   - presenting with abdominal pain, found to have creatinine of 16.25,  and potassium of 6.8   - CT A/P showing left mild hydronephrosis with perinephric stranding but no obstructing calculi   - Nephrology consulted: likely etiology obstructive uropathy from metastatic disease   - Appreciate nephro recs  - UA positive with cystitis and perinephric stranding seen on imaging   - s/p SPC on 6/14  - f/u cultures and c/w antibiotics   - Not a candidate for HD   - Appreciate palliaitive recommendations: DNR/DNI, hospice     # Metastatic Rectal Cancer History   - Diagnosed in August 2020 after having perianal drainage x 4 weeks. Found to have complex perianal fistual and rectal mass with biopsy confirming rectal adenocarcinoma. Obstruction seen on colonoscopy and s/p diverting loop sigmoid colostomy. At time of diagnosis pulmonary metastasis seen with FNA lung nodule biopsy confirming   - Patient has been refusing systemic chemotherapy and hospice  - continue with pain management per palliative  - CT A/P: POD of intrapulmonary and intraabdominal metastasis with rectosigmoid thickening   - Patient has POD on imaging, refused systemic therapy and would benefit from hospice care  - Appreciate palliative care consult and recommendations: patient now DNR/DNI, hospice   - Dr. 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 Found to be in renal failure and progression of disease on imaging. Oncology consulted for further recommendations.     #Obstructive Uropathy with Acute Kidney Failure   - Only baseline creatine is from August 2020 which was normal 0.72   - presenting with abdominal pain, found to have creatinine of 16.25,  and potassium of 6.8   - CT A/P showing left mild hydronephrosis with perinephric stranding but no obstructing calculi   - Nephrology consulted: likely etiology obstructive uropathy from metastatic disease   - Appreciate nephro recs  - UA positive with cystitis and perinephric stranding seen on imaging   - s/p SPC on 6/14  - f/u cultures and c/w antibiotics   - Not a candidate for HD   - Appreciate palliaitive recommendations: DNR/DNI, hospice     # Metastatic Rectal Cancer History   - Diagnosed in August 2020 after having perianal drainage x 4 weeks. Found to have complex perianal fistual and rectal mass with biopsy confirming rectal adenocarcinoma. Obstruction seen on colonoscopy and s/p diverting loop sigmoid colostomy. At time of diagnosis pulmonary metastasis seen with FNA lung nodule biopsy confirming   - Patient has been refusing systemic chemotherapy and hospice  - continue with pain management per palliative  - CT A/P: POD of intrapulmonary and intraabdominal metastasis with rectosigmoid thickening   - Patient has POD on imaging, refused systemic therapy and would benefit from hospice care  - Appreciate palliative care consult and recommendations: patient now DNR/DNI, home hospice   - Dr. Ellis aware of admission and plan for home hospice     Plan for discharge tomorrow 6/15 to home hospice.     Paul Simpson MD   Hematology/Oncology Fellow   pager [phone]   After 5pm and on weekends page on call fellow

## 2021-06-14 NOTE — DISCHARGE NOTE PROVIDER - PROVIDER TOKENS
FREE:[LAST:[Urology],PHONE:[(   )    -],FAX:[(   )    -],ADDRESS:[[facility]  [address] [place] [address], [address] [place] [address], [facility]  [phone]]]

## 2021-06-14 NOTE — HOSPICE CARE NOTE - FAMILY IN AGREEMENT ADDITIONAL DETAILS
Signed consents were received. Per Team, pt will be medically cleared for discharge on Tue 6/15/2021. [facility] RNs continue to follow for safe transition of care.

## 2021-06-14 NOTE — PROGRESS NOTE ADULT - SUBJECTIVE AND OBJECTIVE BOX
ANESTHESIA POSTOP CHECK    76y Male POSTOP DAY 1 S/P     Vital Signs Last 24 Hrs  T(C): 36.5 (13 Jun 2021 23:50), Max: 37.1 (13 Jun 2021 21:21)  T(F): 97.7 (13 Jun 2021 23:50), Max: 98.7 (13 Jun 2021 21:21)  HR: 85 (14 Jun 2021 06:51) (65 - 104)  BP: 115/76 (14 Jun 2021 06:51) (115/76 - 148/89)  BP(mean): 98 (14 Jun 2021 01:15) (85 - 102)  RR: 18 (14 Jun 2021 06:51) (12 - 18)  SpO2: 98% (14 Jun 2021 06:51) (95% - 98%)  I&O's Summary    13 Jun 2021 07:01  -  14 Jun 2021 07:00  --------------------------------------------------------  IN: 458 mL / OUT: 1000 mL / NET: -542 mL        NO APPARENT ANESTHESIA COMPLICATIONS      Comments:

## 2021-06-14 NOTE — PROGRESS NOTE ADULT - SUBJECTIVE AND OBJECTIVE BOX
UROLOGY Progress Note  ZARINA GLOVER    S: Patient seen at bedside.  Overall doing well s/p OR SPT placement, feels relief now that urine is diverted.    Afebrile, urine color clearing.     O:  T(C): 36.5 (06-13-21 @ 23:50), Max: 37.1 (06-13-21 @ 21:21)  HR: 85 (06-14-21 @ 06:51) (65 - 104)  BP: 115/76 (06-14-21 @ 06:51) (115/76 - 148/89)  RR: 18 (06-14-21 @ 06:51) (12 - 18)  SpO2: 98% (06-14-21 @ 06:51) (95% - 98%)      Physical Exam:  Gen: NAD  Neuro: Follows commands  Resp: No acute respiratory distress, normal effort  Abd: Soft, NT, ND, SPT secured w transluscent/thin punch colored urine          Colostomy superior to SPT    Labs:  CBC (06-14 @ 07:34)                              15.5                           13.84<H>  )----------------(  196        --    % Neutrophils, --    % Lymphocytes, ANC: --                                  47.6                CBC (06-13 @ 07:13)                              14.5                           7.96    )----------------(  186        --    % Neutrophils, --    % Lymphocytes, ANC: --                                  44.4                  BMP (06-13 @ 07:13)             138     |  98      |  12    		Ca++ --      Ca 9.3                ---------------------------------( 94    		Mg 1.4<L>             3.2<L>  |  26      |  0.86  			Ph 3.4               -> .Urine Clean Catch (Midstream) Culture (06-10 @ 06:26)     NG    NG    No growth

## 2021-06-14 NOTE — DISCHARGE NOTE PROVIDER - NSDCCPCAREPLAN_GEN_ALL_CORE_FT
PRINCIPAL DISCHARGE DIAGNOSIS  Diagnosis: ALPHONSE (acute kidney injury)  Assessment and Plan of Treatment: Acute kidney injury secondary to obstructive uropathy. You had a garcia placed and your creatinine normalized.      SECONDARY DISCHARGE DIAGNOSES  Diagnosis: Hyperkalemia  Assessment and Plan of Treatment:     Diagnosis: ALPHONSE (acute kidney injury)  Assessment and Plan of Treatment:      PRINCIPAL DISCHARGE DIAGNOSIS  Diagnosis: ALPHONSE (acute kidney injury)  Assessment and Plan of Treatment: Acute kidney injury secondary to obstructive uropathy (blockage in the outflow of urine). You had a garcia placed and your creatinine (kidney funciton) normalized. You required a suprapubic tube for urinary drainage. You will need routine urological follow up for tube exchange every 3 months.      SECONDARY DISCHARGE DIAGNOSES  Diagnosis: Hyperkalemia  Assessment and Plan of Treatment: You potassium was elevated on admission secondary to acute kidney injury.Your potassium normalized prioir to discharge.    Diagnosis: Rectal adenocarcinoma  Assessment and Plan of Treatment: Discharge with home hospice services. Focus on comfort and symptom control. Pain control as prescribed.

## 2021-06-14 NOTE — PROGRESS NOTE ADULT - SUBJECTIVE AND OBJECTIVE BOX
INTERVAL HPI/OVERNIGHT EVENTS:  Patient S&E at bedside. No o/n events,     VITAL SIGNS:  T(F): 97.7 (06-13-21 @ 23:50)  HR: 85 (06-14-21 @ 06:51)  BP: 115/76 (06-14-21 @ 06:51)  RR: 18 (06-14-21 @ 06:51)  SpO2: 98% (06-14-21 @ 06:51)  Wt(kg): --    PHYSICAL EXAM:    Constitutional: NAD  Eyes: EOMI, sclera non-icteric  Neck: supple  Respiratory: CTAB, no wheezes or crackles   Cardiovascular: RRR  Gastrointestinal: soft, NTND, + BS  Extremities: no cyanosis, clubbing or edema   Neurological: awake and alert      MEDICATIONS  (STANDING):  cefTRIAXone   IVPB 1000 milliGRAM(s) IV Intermittent every 24 hours  lactated ringers. 1000 milliLiter(s) (75 mL/Hr) IV Continuous <Continuous>  methadone    Tablet 5 milliGRAM(s) Oral <User Schedule>  methadone    Tablet 10 milliGRAM(s) Oral at bedtime  polyethylene glycol 3350 17 Gram(s) Oral two times a day  senna 2 Tablet(s) Oral at bedtime  sodium zirconium cyclosilicate 10 Gram(s) Oral once    MEDICATIONS  (PRN):  acetaminophen   Tablet .. 650 milliGRAM(s) Oral every 4 hours PRN Mild Pain (1 - 3)  HYDROmorphone  Injectable 0.5 milliGRAM(s) IV Push every 2 hours PRN Severe Pain (7 - 10)  lactulose Syrup 10 Gram(s) Oral four times a day PRN consitpation  ondansetron Injectable 4 milliGRAM(s) IV Push every 6 hours PRN Nausea and/or Vomiting      Allergies    dust (Rhinitis; Headache)  No Known Drug Allergies    Intolerances        LABS:                        15.5   13.84 )-----------( 196      ( 14 Jun 2021 07:34 )             47.6     06-14    139  |  100  |  13  ----------------------------<  143<H>  4.0   |  28  |  1.07    Ca    8.7      14 Jun 2021 07:34  Phos  4.0     06-14  Mg     1.6     06-14            RADIOLOGY & ADDITIONAL TESTS:  Studies reviewed.   INTERVAL HPI/OVERNIGHT EVENTS:  Patient S&E at bedside. No o/n events. S/P SPC. Pain much improved with catheter placement and pain medication.     VITAL SIGNS:  T(F): 97.7 (06-13-21 @ 23:50)  HR: 85 (06-14-21 @ 06:51)  BP: 115/76 (06-14-21 @ 06:51)  RR: 18 (06-14-21 @ 06:51)  SpO2: 98% (06-14-21 @ 06:51)  Wt(kg): --    PHYSICAL EXAM:    Constitutional: NAD  Eyes: EOMI, sclera non-icteric  Neck: supple  Respiratory: CTAB, no wheezes or crackles   Cardiovascular: RRR  Gastrointestinal: soft, NTND, + BS  : SPC in place with garcia draining urine   Extremities: no cyanosis, clubbing or edema   Neurological: awake and alert      MEDICATIONS  (STANDING):  cefTRIAXone   IVPB 1000 milliGRAM(s) IV Intermittent every 24 hours  lactated ringers. 1000 milliLiter(s) (75 mL/Hr) IV Continuous <Continuous>  methadone    Tablet 5 milliGRAM(s) Oral <User Schedule>  methadone    Tablet 10 milliGRAM(s) Oral at bedtime  polyethylene glycol 3350 17 Gram(s) Oral two times a day  senna 2 Tablet(s) Oral at bedtime  sodium zirconium cyclosilicate 10 Gram(s) Oral once    MEDICATIONS  (PRN):  acetaminophen   Tablet .. 650 milliGRAM(s) Oral every 4 hours PRN Mild Pain (1 - 3)  HYDROmorphone  Injectable 0.5 milliGRAM(s) IV Push every 2 hours PRN Severe Pain (7 - 10)  lactulose Syrup 10 Gram(s) Oral four times a day PRN consitpation  ondansetron Injectable 4 milliGRAM(s) IV Push every 6 hours PRN Nausea and/or Vomiting      Allergies    dust (Rhinitis; Headache)  No Known Drug Allergies    Intolerances        LABS:                        15.5   13.84 )-----------( 196      ( 14 Jun 2021 07:34 )             47.6     06-14    139  |  100  |  13  ----------------------------<  143<H>  4.0   |  28  |  1.07    Ca    8.7      14 Jun 2021 07:34  Phos  4.0     06-14  Mg     1.6     06-14            RADIOLOGY & ADDITIONAL TESTS:  Studies reviewed.

## 2021-06-14 NOTE — PROGRESS NOTE ADULT - SUBJECTIVE AND OBJECTIVE BOX
Patient is a 76y old  Male who presents with a chief complaint of Renal failure and hyperkalemia in the setting of obstructive uropathy (14 Jun 2021 10:45)      SUBJECTIVE / OVERNIGHT EVENTS: states he has a lot pain today after procedure he required suprapubic catheter. wife at bedside   ADDITIONAL REVIEW OF SYSTEMS: denies N/V     MEDICATIONS  (STANDING):  cefTRIAXone   IVPB 1000 milliGRAM(s) IV Intermittent every 24 hours  lactated ringers. 1000 milliLiter(s) (75 mL/Hr) IV Continuous <Continuous>  methadone    Tablet 5 milliGRAM(s) Oral <User Schedule>  methadone    Tablet 10 milliGRAM(s) Oral at bedtime  polyethylene glycol 3350 17 Gram(s) Oral two times a day  senna 2 Tablet(s) Oral at bedtime  sodium zirconium cyclosilicate 10 Gram(s) Oral once    MEDICATIONS  (PRN):  acetaminophen   Tablet .. 650 milliGRAM(s) Oral every 4 hours PRN Mild Pain (1 - 3)  HYDROmorphone  Injectable 0.5 milliGRAM(s) IV Push every 2 hours PRN Severe Pain (7 - 10)  lactulose Syrup 10 Gram(s) Oral four times a day PRN consitpation  ondansetron Injectable 4 milliGRAM(s) IV Push every 6 hours PRN Nausea and/or Vomiting      CAPILLARY BLOOD GLUCOSE        I&O's Summary    13 Jun 2021 07:01  -  14 Jun 2021 07:00  --------------------------------------------------------  IN: 458 mL / OUT: 1000 mL / NET: -542 mL        PHYSICAL EXAM:  Vital Signs Last 24 Hrs  T(C): 36.5 (13 Jun 2021 23:50), Max: 37.1 (13 Jun 2021 21:21)  T(F): 97.7 (13 Jun 2021 23:50), Max: 98.7 (13 Jun 2021 21:21)  HR: 85 (14 Jun 2021 06:51) (65 - 104)  BP: 115/76 (14 Jun 2021 06:51) (115/76 - 148/89)  BP(mean): 98 (14 Jun 2021 01:15) (85 - 102)  RR: 18 (14 Jun 2021 06:51) (12 - 18)  SpO2: 98% (14 Jun 2021 06:51) (95% - 98%)    Constitutional: NAD, awake and alert  EYES: EOMI  ENT:  Normal Hearing, no tonsillar exudates    Respiratory: Breath sounds are clear bilaterally, No wheezing, rales or rhonchi  Cardiovascular: S1 and S2, regular rate and rhythm, no Murmurs, gallops or rubs, no JVD,    Gastrointestinal: Bowel Sounds present, soft, nontender, nondistended, no guarding, no rebound    : +suprapubic cath draining light pink urine   Extremities: No cyanosis or clubbing; warm to touch  Neurological: No focal deficits, CN II-XII intact bilaterally, sensation to light touch intact in all extremities. Pupils are equally reactive to light and symmetrical in size.   Musculoskeletal: 5/5 strength b/l upper and lower extremities; no joint swelling.  Psych: AAOx3  HEME: no bruises, no nose bleeds    LABS:                        15.5   13.84 )-----------( 196      ( 14 Jun 2021 07:34 )             47.6     06-14    139  |  100  |  13  ----------------------------<  143<H>  4.0   |  28  |  1.07    Ca    8.7      14 Jun 2021 07:34  Phos  4.0     06-14  Mg     1.6     06-14                  RADIOLOGY & ADDITIONAL TESTS:  Results Reviewed:   Imaging Personally Reviewed:  Electrocardiogram Personally Reviewed:    COORDINATION OF CARE:  Care Discussed with Consultants/Other Providers [Y/N]:  Prior or Outpatient Records Reviewed [Y/N]:

## 2021-06-14 NOTE — PROGRESS NOTE ADULT - ATTENDING COMMENTS
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 Found to have obstructive uropathy and acute renal failure and progression of disease on imaging. He was diagnosed in 8/20 and had surgical bypass for obstruction but did not want any chemotherapy. His disease is progressing and he is now open to having hospice. He was admitted with obstructive uropathy and has had a suprapubic catheter placed and is having pain management. His wife is supportive so plan is to discharge home with hospice.

## 2021-06-14 NOTE — PROGRESS NOTE ADULT - PROBLEM SELECTOR PLAN 1
- secondary to obstructive uropathy  - Patient with ~1 month of urinary hesitancy, dysuria, and decreased urine output as well as abdominal pain now noted to have a Cr of ~16 now downtrended 1.08 s/p garcia placement   - Renal US LT hydro  - palliative recs appreciated- pain meds dialudid 0.5mg IV q 2 PRN  breakthru pain and methadone methadone 5mg, 5mg, 10mg  - f/u palliative increased pain post procedure  - H/H stable   - failed TOV and garcia inserted in false lumen and subsequently taken to OR for suprapubic cath - secondary to obstructive uropathy  - Patient with ~1 month of urinary hesitancy, dysuria, and decreased urine output as well as abdominal pain now noted to have a Cr of ~16 now downtrended 1.08 s/p garcia placement   - Renal US LT hydro  - pain meds dialudid 0.5mg IV q 2 PRN  breakthru pain and methadone methadone 5mg, 5mg, 10mg  - f/u palliative increased pain post procedure  - H/H stable   - failed TOV and garcia inserted in false lumen and subsequently taken to OR for suprapubic cath  - f/u palliative recs

## 2021-06-14 NOTE — DISCHARGE NOTE PROVIDER - NSDCMRMEDTOKEN_GEN_ALL_CORE_FT
methadone 5 mg oral tablet: 1 tab(s) orally in the morning and  and afternoon  2 tabs at bedtime  oxyCODONE 10 mg oral tablet: 1 tab(s) orally every 4 hours, As Needed  prochlorperazine 10 mg oral tablet: 1 tab(s) orally every 6 hours, As Needed   acetaminophen 325 mg oral tablet: 2 tab(s) orally every 4 hours, As needed, Mild Pain (1 - 3)  HYDROmorphone 2 mg oral tablet: 1 tab orally every 4 hours As needed for Moderate Pain OR 2 tabs orally every 4 hours as needed for severe pain MDD:12 tabs ISTOP [id number]  lactulose 10 g/15 mL oral syrup: 15 milliliter(s) orally 4 times a day, As needed, consitpation  methadone 5 mg oral tablet: 1 tab orally in the morning and 2 tabs orally at bedtime MDD:3 tabs ISTOP [id number]  polyethylene glycol 3350 oral powder for reconstitution: 17 gram(s) orally 2 times a day, hold for loose stool  prochlorperazine 10 mg oral tablet: 1 tab(s) orally every 6 hours, As Needed Nausea/Vomiting  senna oral tablet: 2 tab(s) orally once a day (at bedtime)   acetaminophen 325 mg oral tablet: 2 tab(s) orally every 4 hours, As needed, Mild Pain (1 - 3)  HYDROmorphone 2 mg oral tablet: 1 tab orally every 4 hours As needed for Moderate Pain OR 2 tabs orally every 4 hours as needed for severe pain MDD:12 tabs ISTOP [id number]  lactulose 10 g/15 mL oral syrup: 15 milliliter(s) orally 4 times a day, As needed, consitpation  methadone 5 mg oral tablet: 1 tab orally once a day in the morning, 1 tab at lunch time and 2 tabs at bedtime ISTOP [id number] MDD:4 tabs   polyethylene glycol 3350 oral powder for reconstitution: 17 gram(s) orally 2 times a day, hold for loose stool  prochlorperazine 10 mg oral tablet: 1 tab(s) orally every 6 hours, As Needed Nausea/Vomiting  senna oral tablet: 2 tab(s) orally once a day (at bedtime)

## 2021-06-14 NOTE — PROGRESS NOTE ADULT - ASSESSMENT
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     Underwent urethroscopy which revealed traumatic mucosa and unable to find true lumen.  SPT inserted under fluoroscopic guidance with return of clear urine (6/13).      - Keep SPT to drainage  - Monitor strict I&Os  - If concern for clot/retention, can irrigate prn  - Patient should f/u outpatient for routine exchange vs. further workup to identify etiology/repeat scope  -  to sign off, please call with additional questions     [facility]  [address], [address]  [place], [address] [phone]

## 2021-06-15 ENCOUNTER — TRANSCRIPTION ENCOUNTER (OUTPATIENT)
Age: 76
End: 2021-06-15

## 2021-06-15 VITALS
HEART RATE: 70 BPM | SYSTOLIC BLOOD PRESSURE: 123 MMHG | DIASTOLIC BLOOD PRESSURE: 69 MMHG | TEMPERATURE: 98 F | OXYGEN SATURATION: 96 % | RESPIRATION RATE: 18 BRPM

## 2021-06-15 LAB
ANION GAP SERPL CALC-SCNC: 12 MMOL/L — SIGNIFICANT CHANGE UP (ref 7–14)
BUN SERPL-MCNC: 15 MG/DL — SIGNIFICANT CHANGE UP (ref 7–23)
CALCIUM SERPL-MCNC: 8.6 MG/DL — SIGNIFICANT CHANGE UP (ref 8.4–10.5)
CHLORIDE SERPL-SCNC: 100 MMOL/L — SIGNIFICANT CHANGE UP (ref 98–107)
CO2 SERPL-SCNC: 25 MMOL/L — SIGNIFICANT CHANGE UP (ref 22–31)
CREAT SERPL-MCNC: 0.82 MG/DL — SIGNIFICANT CHANGE UP (ref 0.5–1.3)
GLUCOSE SERPL-MCNC: 84 MG/DL — SIGNIFICANT CHANGE UP (ref 70–99)
HCT VFR BLD CALC: 44.8 % — SIGNIFICANT CHANGE UP (ref 39–50)
HGB BLD-MCNC: 14.5 G/DL — SIGNIFICANT CHANGE UP (ref 13–17)
MAGNESIUM SERPL-MCNC: 1.6 MG/DL — SIGNIFICANT CHANGE UP (ref 1.6–2.6)
MCHC RBC-ENTMCNC: 30.6 PG — SIGNIFICANT CHANGE UP (ref 27–34)
MCHC RBC-ENTMCNC: 32.4 GM/DL — SIGNIFICANT CHANGE UP (ref 32–36)
MCV RBC AUTO: 94.5 FL — SIGNIFICANT CHANGE UP (ref 80–100)
NRBC # BLD: 0 /100 WBCS — SIGNIFICANT CHANGE UP
NRBC # FLD: 0 K/UL — SIGNIFICANT CHANGE UP
PHOSPHATE SERPL-MCNC: 3 MG/DL — SIGNIFICANT CHANGE UP (ref 2.5–4.5)
PLATELET # BLD AUTO: 165 K/UL — SIGNIFICANT CHANGE UP (ref 150–400)
POTASSIUM SERPL-MCNC: 3.8 MMOL/L — SIGNIFICANT CHANGE UP (ref 3.5–5.3)
POTASSIUM SERPL-SCNC: 3.8 MMOL/L — SIGNIFICANT CHANGE UP (ref 3.5–5.3)
RBC # BLD: 4.74 M/UL — SIGNIFICANT CHANGE UP (ref 4.2–5.8)
RBC # FLD: 14.2 % — SIGNIFICANT CHANGE UP (ref 10.3–14.5)
SODIUM SERPL-SCNC: 137 MMOL/L — SIGNIFICANT CHANGE UP (ref 135–145)
WBC # BLD: 9.39 K/UL — SIGNIFICANT CHANGE UP (ref 3.8–10.5)
WBC # FLD AUTO: 9.39 K/UL — SIGNIFICANT CHANGE UP (ref 3.8–10.5)

## 2021-06-15 PROCEDURE — 99239 HOSP IP/OBS DSCHRG MGMT >30: CPT

## 2021-06-15 RX ORDER — POLYETHYLENE GLYCOL 3350 17 G/17G
17 POWDER, FOR SOLUTION ORAL
Qty: 1020 | Refills: 0
Start: 2021-06-15 | End: 2021-07-14

## 2021-06-15 RX ORDER — SENNA PLUS 8.6 MG/1
2 TABLET ORAL
Qty: 60 | Refills: 0
Start: 2021-06-15 | End: 2021-07-14

## 2021-06-15 RX ORDER — HYDROMORPHONE HYDROCHLORIDE 2 MG/ML
2 INJECTION INTRAMUSCULAR; INTRAVENOUS; SUBCUTANEOUS ONCE
Refills: 0 | Status: DISCONTINUED | OUTPATIENT
Start: 2021-06-15 | End: 2021-06-15

## 2021-06-15 RX ORDER — LACTULOSE 10 G/15ML
15 SOLUTION ORAL
Qty: 1800 | Refills: 0
Start: 2021-06-15 | End: 2021-07-14

## 2021-06-15 RX ORDER — PROCHLORPERAZINE MALEATE 5 MG
1 TABLET ORAL
Qty: 0 | Refills: 0 | DISCHARGE

## 2021-06-15 RX ORDER — PROCHLORPERAZINE MALEATE 5 MG
1 TABLET ORAL
Qty: 28 | Refills: 0
Start: 2021-06-15 | End: 2021-06-21

## 2021-06-15 RX ORDER — METHADONE HYDROCHLORIDE 40 MG/1
1 TABLET ORAL
Qty: 0 | Refills: 0 | DISCHARGE

## 2021-06-15 RX ORDER — OXYCODONE HYDROCHLORIDE 5 MG/1
1 TABLET ORAL
Qty: 0 | Refills: 0 | DISCHARGE

## 2021-06-15 RX ORDER — METHADONE HYDROCHLORIDE 40 MG/1
1 TABLET ORAL
Qty: 90 | Refills: 0
Start: 2021-06-15

## 2021-06-15 RX ORDER — HYDROMORPHONE HYDROCHLORIDE 2 MG/ML
1 INJECTION INTRAMUSCULAR; INTRAVENOUS; SUBCUTANEOUS
Qty: 84 | Refills: 0
Start: 2021-06-15 | End: 2021-06-28

## 2021-06-15 RX ORDER — ACETAMINOPHEN 500 MG
2 TABLET ORAL
Qty: 360 | Refills: 0
Start: 2021-06-15 | End: 2021-07-14

## 2021-06-15 RX ORDER — METHADONE HYDROCHLORIDE 40 MG/1
1 TABLET ORAL
Qty: 56 | Refills: 0
Start: 2021-06-15

## 2021-06-15 RX ADMIN — HYDROMORPHONE HYDROCHLORIDE 4 MILLIGRAM(S): 2 INJECTION INTRAMUSCULAR; INTRAVENOUS; SUBCUTANEOUS at 03:51

## 2021-06-15 RX ADMIN — HYDROMORPHONE HYDROCHLORIDE 2 MILLIGRAM(S): 2 INJECTION INTRAMUSCULAR; INTRAVENOUS; SUBCUTANEOUS at 12:48

## 2021-06-15 RX ADMIN — HYDROMORPHONE HYDROCHLORIDE 4 MILLIGRAM(S): 2 INJECTION INTRAMUSCULAR; INTRAVENOUS; SUBCUTANEOUS at 11:00

## 2021-06-15 RX ADMIN — HYDROMORPHONE HYDROCHLORIDE 4 MILLIGRAM(S): 2 INJECTION INTRAMUSCULAR; INTRAVENOUS; SUBCUTANEOUS at 10:05

## 2021-06-15 RX ADMIN — HYDROMORPHONE HYDROCHLORIDE 4 MILLIGRAM(S): 2 INJECTION INTRAMUSCULAR; INTRAVENOUS; SUBCUTANEOUS at 04:51

## 2021-06-15 RX ADMIN — METHADONE HYDROCHLORIDE 5 MILLIGRAM(S): 40 TABLET ORAL at 07:25

## 2021-06-15 NOTE — CHART NOTE - NSCHARTNOTEFT_GEN_A_CORE
Leos Catheter was removed this am and Pt has not voided. 2 attempts by floor RN unsuccessful catheterization. Writer attempted with size 14 coude Catheter   and was not successful. Called Urology for assistance as patient was bladder scan and 500 cc's was seen in bladder and pt was slightly distended.  Urology resident attempted to place catheter and efforts were terminated as he was a difficult insertion and being met with resistance. Plan is for resident to   attempt catheterization with camera.
Reference #: [id number]    Patient Name: Sai HenaoBirth Date: 1945  Address[address], [place] [address]Lmg: Male  Rx Written	Rx Dispensed	Drug	Quantity	Days Supply	Prescriber Name	Prescriber Pam #	Payment Method	Dispenser  08/17/2020	08/19/2020	oxycodone hcl 5 mg tablet	20	5	[place]	[id number]	[facility]    Patient Name: Sai HenaoBirth Date: 1945  Address[address] [address], [place] [address]Tli: Male  Rx Written	Rx Dispensed	Drug	Quantity	Days Supply	Prescriber Name	Prescriber Pam #	Payment Method	Dispenser  06/03/2021	06/04/2021	oxycodone hcl 10 mg tablet	90	15	Mami Gibbs P (MS)	[id number]	Medicare	[id number]  05/13/2021	05/14/2021	oxycodone hcl 10 mg tablet	90	15	Mami Gibbs P (MS)	[id number]	Medicare	[facility][id number]  05/13/2021	05/14/2021	methadone hcl 5 mg tablet	120	30	Mami Gibbs P (MS)	[id number]	[id number]  04/23/2021	04/24/2021	methadone hcl 5 mg tablet	60	30	Maribeth Rowe	[id number]	Medicare	[id number]  04/23/2021	04/24/2021	oxycodone hcl 10 mg tablet	90	15	Maribeth Rowe	[id number]	Medicare	[facility][id number]  03/22/2021	03/25/2021	oxycodone hcl 10 mg tablet	90	15	Eliza Ellis	[id number]	Medicare	[facility][id number]  03/22/2021	03/25/2021	methadone hcl 5 mg tablet	60	30	Eliza Ellis	[id number]	Medicare	[facility][id number]  02/22/2021	02/25/2021	oxycodone hcl 10 mg tablet	90	30	Eliza Ellis	[id number]	Medicare	[facility][id number]  02/11/2021	02/11/2021	oxycodone hcl 10 mg tablet	45	15	Maribeth Rowe	[id number]	Medicare	[facility][id number]  02/11/2021	02/11/2021	methadone hcl 5 mg tablet	60	30	Maribeth Rowe	[id number]	Medicare	[id number]  01/04/2021	01/07/2021	oxycodone hcl 10 mg tablet	45	15	Eliza Ellis	[id number]	Medicare	[facility][id number]  01/04/2021	01/07/2021	methadone hcl 5 mg tablet	60	30	EllisEliza mosher	[id number]	Medicare	[facility][id number]  12/09/2020	12/09/2020	oxycodone hcl 10 mg tablet	45	15	Eliza Ellis	[id number]	Medicare	[facility] #[id number]  12/09/2020	12/09/2020	methadone hcl 5 mg tablet	30	15	Eliza Ellis	[id number]	Medicare	[facility][id number]  10/06/2020	10/07/2020	oxycodone hcl 10 mg tablet	60	10	Maribeth Rowe NP	[id number]	Medicare	[facility][id number]  09/11/2020	09/11/2020	oxycodone hcl 10 mg tablet	45	8	Maribeth Rowe NP	[id number]	Medicare	[facility][id number]  08/24/2020	08/24/2020	oxycodone hcl 10 mg tablet	45	15	Eliza Ellis	[id number]	Medicare	[facility] #[id number]
complaint of N/V    Pt seen at bedside with wife, pt has been having chronic nausea. No abdominal pain, normal BM    Vital Signs Last 24 Hrs  T(C): 36.4 (11 Jun 2021 08:29), Max: 36.7 (10 Kwasi 2021 15:48)  T(F): 97.5 (11 Jun 2021 08:29), Max: 98 (10 Kwasi 2021 15:48)  HR: 79 (11 Jun 2021 08:29) (78 - 96)  BP: 117/78 (11 Jun 2021 08:29) (97/43 - 123/83)  BP(mean): --  RR: 18 (11 Jun 2021 08:29) (15 - 18)  SpO2: 96% (11 Jun 2021 08:29) (95% - 99%)    S1S2 no murmur noted  Lung CTA  Abdomen soft, NT, + BS  no pitting edema    zofran PRN     will continue to monitor
Per discussion with palliative Dr. Dupont, will discharge on Methadone 5 mg in the morning, 5 mg in afternoon and 10 mg at bedtime. Will continue with current dose of dilaudid. Discussed with pt and wife at bedside. Meds sent to [place].

## 2021-06-15 NOTE — PROGRESS NOTE ADULT - PROBLEM SELECTOR PLAN 1
- secondary to obstructive uropathy  - Patient with ~1 month of urinary hesitancy, dysuria, and decreased urine output as well as abdominal pain now noted to have a Cr of ~16 now downtrended to normal s/p garcia placement   - Renal US LT hydro  - pain meds dialudid 4mg Po q4 PRN severe and 2mg PO q4 PRN moderate breakthru pain and methadone methadone 5mg, 5mg, 10mg  - failed TOV and garcia inserted in false lumen and subsequently taken to OR for suprapubic cath   - H/H stable with normal Cr yellow urine   - f/u palliative recs

## 2021-06-15 NOTE — PROGRESS NOTE ADULT - PROBLEM SELECTOR PROBLEM 1
Renal failure, unspecified chronicity
ALPHONSE (acute kidney injury)
Renal failure, unspecified chronicity
Renal failure, unspecified chronicity

## 2021-06-15 NOTE — PROGRESS NOTE ADULT - SUBJECTIVE AND OBJECTIVE BOX
Patient is a 76y old  Male who presents with a chief complaint of Renal failure and hyperkalemia in the setting of obstructive uropathy (14 Jun 2021 15:02)      SUBJECTIVE / OVERNIGHT EVENTS: Pt complaining of pain RT side of abdomen with deep inspiration. tachycardia improved.  used 3 doses of PO dilaudid yesterday educated on pain medication use.   ADDITIONAL REVIEW OF SYSTEMS: denies N/V/D     MEDICATIONS  (STANDING):  cefTRIAXone   IVPB 1000 milliGRAM(s) IV Intermittent every 24 hours  lactated ringers. 1000 milliLiter(s) (75 mL/Hr) IV Continuous <Continuous>  methadone    Tablet 5 milliGRAM(s) Oral <User Schedule>  methadone    Tablet 10 milliGRAM(s) Oral at bedtime  polyethylene glycol 3350 17 Gram(s) Oral two times a day  senna 2 Tablet(s) Oral at bedtime  sodium zirconium cyclosilicate 10 Gram(s) Oral once    MEDICATIONS  (PRN):  acetaminophen   Tablet .. 650 milliGRAM(s) Oral every 4 hours PRN Mild Pain (1 - 3)  HYDROmorphone   Tablet 4 milliGRAM(s) Oral every 4 hours PRN Severe Pain (7 - 10)  HYDROmorphone   Tablet 2 milliGRAM(s) Oral every 4 hours PRN Moderate Pain (4 - 6)  lactulose Syrup 10 Gram(s) Oral four times a day PRN consitpation  ondansetron Injectable 4 milliGRAM(s) IV Push every 6 hours PRN Nausea and/or Vomiting      CAPILLARY BLOOD GLUCOSE        I&O's Summary    14 Jun 2021 07:01  -  15 Kwasi 2021 07:00  --------------------------------------------------------  IN: 1190 mL / OUT: 1250 mL / NET: -60 mL    15 Kwasi 2021 07:01  -  15 Kwasi 2021 09:51  --------------------------------------------------------  IN: 200 mL / OUT: 0 mL / NET: 200 mL        PHYSICAL EXAM:  Vital Signs Last 24 Hrs  T(C): 36.5 (15 Kwasi 2021 05:34), Max: 36.7 (14 Jun 2021 20:45)  T(F): 97.7 (15 Kwasi 2021 05:34), Max: 98 (14 Jun 2021 20:45)  HR: 69 (15 Kwasi 2021 05:34) (59 - 69)  BP: 113/66 (15 Kwasi 2021 05:34) (104/63 - 113/66)  BP(mean): --  RR: 18 (15 Kwasi 2021 05:34) (18 - 18)  SpO2: 95% (15 Kwasi 2021 05:34) (95% - 96%)    Constitutional: NAD, awake and alert  EYES: EOMI  ENT:  Normal Hearing, no tonsillar exudates    Respiratory: Breath sounds are clear bilaterally, No wheezing, rales or rhonchi  Cardiovascular: S1 and S2, regular rate and rhythm, no Murmurs, gallops or rubs, no JVD,    Gastrointestinal: Bowel Sounds present, soft, nontender, nondistended, no guarding, no rebound  +ostomy LT   : +suprapubic cath draining yellow urine   Extremities: No cyanosis or clubbing; warm to touch  Neurological: No focal deficits, CN II-XII intact bilaterally, sensation to light touch intact in all extremities.   Musculoskeletal: 5/5 strength b/l upper and lower extremities; no joint swelling.  Psych: AAOx3      LABS:                        14.5   9.39  )-----------( 165      ( 15 Kwasi 2021 06:51 )             44.8     06-15    137  |  100  |  15  ----------------------------<  84  3.8   |  25  |  0.82    Ca    8.6      15 Kwasi 2021 06:51  Phos  3.0     06-15  Mg     1.6     06-15                  RADIOLOGY & ADDITIONAL TESTS:  Results Reviewed:   Imaging Personally Reviewed:  Electrocardiogram Personally Reviewed:    COORDINATION OF CARE:  Care Discussed with Consultants/Other Providers [Y/N]:  Prior or Outpatient Records Reviewed [Y/N]:

## 2021-06-15 NOTE — DISCHARGE NOTE NURSING/CASE MANAGEMENT/SOCIAL WORK - PATIENT PORTAL LINK FT
You can access the FollowMyHealth Patient Portal offered by [facility] by registering at the following website: [email or web address]. By joining [employer]’s FollowMyHealth portal, you will also be able to view your health information using other applications (apps) compatible with our system.

## 2021-06-15 NOTE — PROGRESS NOTE ADULT - PROBLEM SELECTOR PLAN 4
- Patient follows with outpatient oncologist, is not on any current treatment regimen    - MOLST in chart DNR/DNI - Patient follows with outpatient oncologist, is not on any current treatment regimen    - MOLST in chart DNR/DNI. home with hospice  -dc 40 min

## 2021-06-15 NOTE — PROGRESS NOTE ADULT - NSICDXPILOT_GEN_ALL_CORE
[place]

## 2021-06-15 NOTE — PROGRESS NOTE ADULT - PROVIDER SPECIALTY LIST ADULT
Anesthesia
Urology
Hospitalist
Hospitalist
Nephrology
Heme/Onc
Urology
Palliative Care
Hospitalist

## 2021-06-15 NOTE — PROGRESS NOTE ADULT - REASON FOR ADMISSION
Renal failure and hyperkalemia in the setting of obstructive uropathy

## 2021-06-15 NOTE — PROGRESS NOTE ADULT - PROBLEM SELECTOR PLAN 3
- Noted to have hyperKalemia to 6.8 without EKG changes in setting of acute renal failure  - now resolved discontinue tele
- possible cystitis  - IV Ceftriaxone x1 in ED   - Urine culture NGTD  - day 2/3 ceftriaxone
- possible cystitis  - IV Ceftriaxone x1 in ED   - Urine culture NGTD  - completed ceftriaxone
- possible cystitis  - IV Ceftriaxone x1 in ED   - Urine culture NGTD  - completed ceftriaxone
- possible cystitis  - Urine culture NGTD  - elevated WBC suspect likely due to procedure repeat UCx in lab  - cont ceftriaxone
no further dmt
Arm band on/IV intact

## 2021-06-15 NOTE — PROGRESS NOTE ADULT - PROBLEM SELECTOR PROBLEM 2
Electrolyte abnormality
Electrolyte abnormality
Pyuria
Pain
Electrolyte abnormality
Electrolyte abnormality

## 2021-06-15 NOTE — PROGRESS NOTE ADULT - PROBLEM SELECTOR PLAN 5
- 5000U Heparin SC TID    dispo: home with hospice
- 5000U Heparin SC TID held due to hematuria now resolved    dispo: home with hospice  discharge home

## 2021-06-15 NOTE — PROGRESS NOTE ADULT - PROBLEM SELECTOR PLAN 2
- possible cystitis  - 6/10 Urine culture NGTD  - suprapubic placed---elevated WBC suspect likely due to procedure-- repeat UCx in lab-- WBC now trended to normal   - discontinue ceftriaxone s/p 5 days
- Noted to have hyperKalemia to 6.8 without EKG changes in setting of acute renal failure  - now resolved discontinue tele
continue methadone 5mg, 5mg, 10mg   can change dilaudid iv to oxy ir 5-10mg q 4 hours prn if goal is home   bowel regimen - senna, lactulose
- Noted to have hyperKalemia to 6.8 without EKG changes in setting of acute renal failure  - now resolved discontinue tele

## 2021-06-16 NOTE — PROVIDER CONTACT NOTE (CRITICAL VALUE NOTIFICATION) - SITUATION
Moderate polymorphonuclear leukocytes per low power field  Few Gram positive cocci in pairs per oil power field  Few Gram Positive Rods per oil power field

## 2021-06-28 NOTE — DISCHARGE NOTE PROVIDER - HOSPITAL COURSE
This is a 75M with history of Basal Cell Carcinoma of nose s/p surgical resection with nasal reconstruction who presents to the hospital with complaints of perianal rectal discharge and lower back pain x4 weeks. He was admitted on August 4th, he said that he initially noted feculent discharge from around his anal area and he also had some lower back pain that started around the same time. 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 He was admitted to medicine.        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 Erosive changes at L4-L5, suspicious for discitis osteomyelitis. Bilateral pulmonary nodules, suspicious for metastatic disease. Surgery consulted.     8/5: Surgery pending, MRI (+) discitis/abscess. ID following, d/c abx for now. CT chest pending     8/6: NPO p [place] for OR; IR consulted for lung and spine biopsy; f/u Bcx; off Abx per ID; GI consulted for colonoscopy    8/7: EUA with surg scheduled at 4pm. Toradol and lidocaine patch given for low back pain. Pt prefers outpt colonoscopy but open to inpatient. Blood Cx NG    8/8: s/p eua w/ bx.     8/9: Pain control; NPO p [place] for ? IR/GI    8/10: IR bx for spine/lung. Start abx after IR bx (start vanc 1g q12 and Ertapenem 1g q24, per ID recs)     8/11: colo rescheduled for tomorrow as pt did not prep last night. CLD. bx results pending     8/12: S/p colo (poor prep, bx collected). no repeat needed.     8/13: CLD, NPO at midnight. plan for colostomy vandana by surgery. Pt agreeable. Will hold lovenox. onc following.        On 8/14, he underwent laparoscopic sigmoid loop colostomy and umbilical hernia repair.    The patient tolerated the procedure well, there were no complications. The patient was extubated in the OR, transferred to the PACU in stable condition and then transferred to a surgical floor. Once bowel function returned, his diet was advanced as tolerated. The patient had daily wound care and was seen by physical therapy which recommended discharge to home. The patient's pain was controlled by IV pain medications and then by PO pain medications. The patient was placed back on their home medications.        At the time of discharge, the patient was hemodynamically stable, tolerating PO diet, voiding urine, passing stool, ambulating and was comfortable with adequate pain control. The patient was instructed to follow up with  within 1-2 weeks after discharge. The patient felt comfortable with discharge. The patient was discharged to home. The patient had no other issues. Positive reinforcement This is a 75M with history of Basal Cell Carcinoma of nose s/p surgical resection with nasal reconstruction who presents to the hospital with complaints of perianal rectal discharge and lower back pain x4 weeks. He was admitted on August 4th, he said that he initially noted feculent discharge from around his anal area and he also had some lower back pain that started around the same time. 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 He was admitted to medicine.        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 Erosive changes at L4-L5, suspicious for discitis osteomyelitis. Bilateral pulmonary nodules, suspicious for metastatic disease. Surgery consulted.     8/5: Surgery pending, MRI (+) discitis/abscess. ID following, d/c abx for now. CT chest pending     8/6: NPO p [place] for OR; IR consulted for lung and spine biopsy; f/u Bcx; off Abx per ID; GI consulted for colonoscopy    8/7: EUA with surg scheduled at 4pm. Toradol and lidocaine patch given for low back pain. Pt prefers outpt colonoscopy but open to inpatient. Blood Cx NG    8/8: s/p eua w/ bx.     8/9: Pain control; NPO p [place] for ? IR/GI    8/10: IR bx for spine/lung. Start abx after IR bx (start vanc 1g q12 and Ertapenem 1g q24, per ID recs)     8/11: colo rescheduled for tomorrow as pt did not prep last night. CLD. bx results pending     8/12: S/p colo (poor prep, bx collected). no repeat needed.     8/13: CLD, NPO at midnight. plan for colostomy vandana by surgery. Pt agreeable. Will hold lovenox. onc following.        On 8/14, he underwent laparoscopic sigmoid loop colostomy and umbilical hernia repair.    The patient tolerated the procedure well, there were no complications. The patient was extubated in the OR, transferred to the PACU in stable condition and then transferred to a surgical floor. Once bowel function returned, his diet was advanced as tolerated. The patient had daily wound care and was seen by physical therapy which recommended discharge to home. The patient's pain was controlled by IV pain medications and then by PO pain medications. The patient was placed back on their home medications.        Patient will be discharged on IV antibiotics (Ertapenem and Vancomycin through 9/21). PICC line placed on 8/17. Labs to faxed to Dr Segovia (ID [facility]) at [phone].        At the time of discharge, the patient was hemodynamically stable, tolerating PO diet, voiding urine, passing stool, ambulating and was comfortable with adequate pain control. The patient was instructed to follow up with  within 1-2 weeks after discharge. The patient felt comfortable with discharge. The patient was discharged to home. The patient had no other issues. This is a 75M with history of Basal Cell Carcinoma of nose s/p surgical resection with nasal reconstruction who presents to the hospital with complaints of perianal rectal discharge and lower back pain x4 weeks. He was admitted on August 4th, he said that he initially noted feculent discharge from around his anal area and he also had some lower back pain that started around the same time. 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 He was admitted to medicine.        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 Erosive changes at L4-L5, suspicious for discitis osteomyelitis. Bilateral pulmonary nodules, suspicious for metastatic disease. Surgery consulted.     8/5: Surgery pending, MRI (+) discitis/abscess. ID following, d/c abx for now. CT chest pending     8/6: NPO p [place] for OR; IR consulted for lung and spine biopsy; f/u Bcx; off Abx per ID; GI consulted for colonoscopy    8/7: EUA with surg scheduled at 4pm. Toradol and lidocaine patch given for low back pain. Pt prefers outpt colonoscopy but open to inpatient. Blood Cx NG    8/8: s/p eua w/ bx.     8/9: Pain control; NPO p [place] for ? IR/GI    8/10: IR bx for spine/lung. Start abx after IR bx (start vanc 1g q12 and Ertapenem 1g q24, per ID recs)     8/11: colo rescheduled for tomorrow as pt did not prep last night. CLD. bx results pending     8/12: S/p colo (poor prep, bx collected). no repeat needed.     8/13: CLD, NPO at midnight. plan for colostomy vandana by surgery. Pt agreeable. Will hold lovenox. onc following.        On 8/14, he underwent laparoscopic sigmoid loop colostomy and umbilical hernia repair.    The patient tolerated the procedure well, there were no complications. The patient was extubated in the OR, transferred to the PACU in stable condition and then transferred to a surgical floor. Once bowel function returned, his diet was advanced as tolerated. The patient had daily wound care and was seen by physical therapy which recommended discharge to home. The patient's pain was controlled by IV pain medications and then by PO pain medications. The patient was placed back on his home medications.        Patient will be discharged on IV antibiotics (Ertapenem and Vancomycin through 9/21) per ID. PICC line placed on 8/17. Labs to be faxed weekly to Dr Segovia (ID Attending) at [phone].        At the time of discharge, the patient was hemodynamically stable, tolerating PO diet, voiding urine, passing stool, ambulating and was comfortable with adequate pain control. The patient was instructed to follow up with  within 1-2 weeks after discharge. The patient felt comfortable with discharge. The patient was discharged to home with VNS for ostomy care and IV antibiotic administration. The patient had no other issues.

## 2021-08-11 NOTE — PRE-OP CHECKLIST - AICD PRESENT
[facility] GERIATRIC SERVICES    Facility:  [facility] ([facility]) [[id number]]  Code Status: DNR      CHIEF COMPLAINT/REASON FOR VISIT:  Chief Complaint   Patient presents with     Hospital F/U       HPI:   Dread is a 88 year old male who was residing here at the [facility] TCU undergoing physical and occupational therapy secondary to left total knee arthroplasty.  He was then sent back to the hospital few times secondary to acute GI bleeding and he was given a blood transfusion in the hospital.  He was then discharged back here on proton pump inhibitor.  During that time he does have chronic kidney disease but worsening of creatinine.  I did want him IV fluids here but staff was somewhat uncomfortable with that at this time.  So I recommended discharge to the hospital.  He also has some delirium at this time so something else could have been going on.  Creatinine was 2.74 in the hospital he was given 1.25 L intravenous fluids and this did improve.  It did dilute his hemoglobin a little bit but he has a history of chronic kidney disease stage III and he follows with nephrology.  He was confused and sentences did not make any sense and he was put on Seroquel he did see psychiatry in the hospital and medications were changed.  I am going to clarify them to 50 mg at night of Seroquel 25 mg twice daily as needed for agitation.  We can move from there at this time because the med reconciliation indicates likely too much Seroquel at this time.  His baseline creatinine is 1.5-1.8 and will be checking rechecking that on Monday.  He continues his Toprol-XL 25 mg daily and his COPD did remain stable.    He did have a recent admission and GI bleed hemoglobin was stable at 7.7.  He does remain on Protonix twice daily and no signs of any bleeding.  According to notes the ferrous sulfate was DC'd for some reason.  I will start that again secondary to his low hemoglobin.    According to therapy staff he is was very 
confused yesterday.  Difficult to do therapy with him and he does have periods of agitation.  They do not feel as he is appropriate for therapy at this time.  He is a DNR/DNI and there is no listed power of  just a friend of his emergency contact.  He does claim however he is not in any pain but because of his dementia versus delirium is unclear what is going on with him at this time.    I believe he is more confused than he was when he first came in from his knee.    Past Medical History:  Past Medical History:   Diagnosis Date     Alcohol abuse      Cerebellar cerebrovascular accident without late effect 04/17/2005    Mild right-sided weakness.  MRI showed a 4.5 cm infarct in the left cerebellum.     Chronic headaches      COPD (chronic obstructive pulmonary disease) (H)      Depression      Iron deficiency anemia 10/03/2012     Macular degeneration, bilateral      Osteoarthritis      Peptic ulcer disease 1975    Secondary to alcohol use.     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  Diverticulosis, internal hemorrhoids.     ESOPHAGOSCOPY, GASTROSCOPY, DUODENOSCOPY (EGD), COMBINED  10/04/2012    Chronic gastritis.     INGUINAL HERNIA REPAIR Right 7/8/2014    Procedure: HERNIA REPAIR INGUINAL, RIGHT;  Surgeon: Mack Kowalski MD;  Location: [facility];  Service:      PICC  12/2/2014          TONSILLECTOMY  1949       Family History:   Family History   Problem Relation Age of Onset     Heart Failure Mother 93.00     Cerebrovascular Disease Father 79.00     Coronary Artery Disease Father      Cancer Sister 
76.00     No Known Problems Son         Not in contact.     No Known Problems Daughter         Not in contact.       Social History:    Social History     Socioeconomic History     Marital status:      Spouse name: Not on file     Number of children: Not on file     Years of education: Not on file     Highest education level: Not on file   Occupational History     Not on file   Tobacco Use     Smoking status: Heavy Tobacco Smoker     Packs/day: 0.50     Types: Cigarettes     Smokeless tobacco: Never Used     Tobacco comment: per day   Substance and Sexual Activity     Alcohol use: No     Drug use: No     Sexual activity: Not on file   Other Topics Concern     Not on file   Social History Narrative    Lives with family      Social Determinants of Health     Financial Resource Strain:      Difficulty of Paying Living Expenses:    Food Insecurity:      Worried About Running Out of Food in the Last Year:      Ran Out of Food in the Last Year:    Transportation Needs:      Lack of Transportation (Medical):      Lack of Transportation (Non-Medical):    Physical Activity:      Days of Exercise per Week:      Minutes of Exercise per Session:    Stress:      Feeling of Stress :    Social Connections:      Frequency of Communication with Friends and Family:      Frequency of Social Gatherings with Friends and Family:      Attends Zoroastrian Services:      Active Member of Clubs or Organizations:      Attends Club or Organization Meetings:      Marital Status:    Intimate Partner Violence:      Fear of Current or Ex-Partner:      Emotionally Abused:      Physically Abused:      Sexually Abused:        REVIEW OF SYSTEM: Patient denies any knee pain or pain in general and denies any fevers chills nausea vomiting diarrhea change in vision hearing taste or smell weakness one-sided chest pain shortness of breath.  He says he sleeping well and likely he is on too much Seroquel at this time.  He denies any other problems but 
review of systems difficult secondary to patient's dementia.      PHYSICAL EXAM: Alert confused but no acute distress.  Head was normocephalic and atraumatic sclera conjunctiva sclera mucosa was moist.  Nose no discharge.  Heart sounds were sent but appeared regular at this time and lungs were clear to auscultation.  Legs did not show any signs of cyanosis or edema.  There is no calf tenderness and Homans' sign was negative.  Neuro exam was baseline with the exception of his mental status declined.        LABS: On 8/5/2021 the lab I had for hemoglobin was 8.1.  Creatinine was 2.55.  In the hospital on 8/11/2020 and creatinine was 1.42, please 307, hemoglobin was 7.7 and went from 8.2-8.5 and then to 9.0 then to 8.8 then to 7.9.  White count was 12.4 on admission 7.0 on 8/8/2021.    Vital; blood pressure 111/60    Pulse of 63    Temperature is 98.2    Respirations 18    O2 sats 99% on room air.      ASSESSMENT:    Encounter Diagnoses   Name Primary?     Status post total left knee replacement Yes     Cellulitis of left knee      Stage 3a chronic kidney disease      Anemia due to blood loss, acute      Acute metabolic encephalopathy      Acute kidney injury (H)         PLAN: At this time I will clarify Seroquel was 50 mg at night is okay 25 mg twice daily as needed for agitation no more than that.  Hemoglobin today is secondary to anemia PT OT will evaluate and treat and test for cognition.  Ferrous sulfate 25 mg twice daily will be started that was stopped secondary anemia that is essential.  Basic metabolic profile be Monday second [place] to acute kidney injury and CBC today secondary to confusion.  We will continue to monitor above medical problems and no other changes to care plan at this time.        Electronically signed by: LORI REYNAGA DO  
no

## 2021-09-29 NOTE — PROGRESS NOTE ADULT - ATTENDING COMMENTS
unknown GI, ID, Surgery and oncology appreciated.  For OR tomorrow for lap loop colostomy.  MRI pelvis for staging.  Cont ABX for presumed OM of spine (presumably related to malignancy). F/u spinal collection cultures. F/u BX's of rectal lesion, lung lesions.  F/u Onc recs.

## 2022-02-07 NOTE — H&P ADULT - GASTROINTESTINAL DETAILS
Writer shu to contact patient regarding provider message below. Patient was unavailable and a voicemail message was left to contact the [facility] at [phone] and ask to speak with a nurse.     If the patient calls back please relay provider message below regarding lab results.     Kenna Prakash RN, BSN  [facility]     no guarding/soft/nontender/bowel sounds normal/no masses palpable/no rebound tenderness/no rigidity/no bruit

## 2022-02-08 NOTE — H&P ADULT - NSHPPOAPRESSUREULCER_GEN_ALL_CORE
Medication/Dose: norethindrone-ethinyl estradiol 1-20 mc-mcg  Patient last seen by PCP: 08/16/2021  Next office visit with PCP: none scheduled   Last Lab:08/16/2021    Above information requires contacting patient: No    Prescription does not require PDMP check    Sent to provider to approve or deny       no

## 2022-02-11 NOTE — CONSULT NOTE ADULT - ASSESSMENT
74yo M w/ PMHx Basal Cell Carcinoma of nose/ anterior chest s/p MOHS surgery presented 08/04 to [facility] w/ perianal drainage and low back pain, found to have perianal fistula, suspicion of L4-5 discitis OM, and CT+ for pulmonary nodules of unclear etiology. S/p EUA w/ bx by surgery on 8/7, lung/spine biopsy w/ IR on 8/10 - pending results. S/p Colonoscopy w/ rectal mass bx by GI on 8/12 concerning for rectal malignancy, now scheduled w/ colorectal surgery for lap sigmoid colostomy tomorrow AM. chest pain

## 2022-08-20 NOTE — PROGRESS NOTE ADULT - PROBLEM SELECTOR PROBLEM 4
Rectal adenocarcinoma
Rectal adenocarcinoma
Palliative care encounter
Rectal adenocarcinoma
Patient informed

## 2023-03-10 NOTE — ED ADULT NURSE NOTE - ALCOHOL PRE SCREEN (AUDIT - C)
Case Management Assessment & Discharge Planning Note    Patient name Colleen Gave  Location [place] [facility] [facility] Beth Cardona* MRN [id number]  : 1962 Date 3/10/2023       Current Admission Date: 3/9/2023  Current Admission Diagnosis:Hypertensive emergency   Patient Active Problem List    Diagnosis Date Noted   • Hypertensive emergency 2023   • Acute respiratory failure with hypoxia ([place] [address] ) 2023   • Skin ulcer of toe of left foot with fat layer exposed ([place] [address] ) 2023   • Secondary hyperparathyroidism of renal origin ([place] [address] ) 2023   • Urinary retention 2023   • Peripheral arterial disease ([place] [address] ) 2022   • Hypocalcemia 2022   • Anemia of chronic disease 2022   • Chronic diastolic heart failure ([place] [address] ) 2022   • Visual disorder 2021   • Adenomatous polyp of colon 10/01/2021   • Abnormal EKG 2021   • Pulmonary nodule 2021   • Long term (current) use of antibiotics 02/15/2021   • TB lung, latent 2021   • Positive QuantiFERON-TB Gold test 2021   • Onychomycosis 2020   • Diabetic polyneuropathy associated with type 2 diabetes mellitus ([place] [address] ) 2020   • Slow transit constipation 2019   • Hyperphosphatemia 2019   • Benign hypertension with ESRD (end-stage renal disease) ([place] [address] )    • Azotemia 2019   • S/P arteriovenous (AV) fistula creation 2019   • ESRD on dialysis ([place] [address] ) 2019   • Chronic constipation 2018   • Screening for colon cancer 2018   • Hyperlipidemia 2018   • Type 2 diabetes mellitus, with long-term current use of insulin ([place] [address] ) 2018   • Diabetic retinopathy of both eyes associated with type 2 diabetes mellitus ([place] [address] ) 2018   • LVH (left ventricular hypertrophy) 2018      LOS (days): 1  Geometric Mean LOS (GMLOS) (days): 3 40  Days to GMLOS:1 9     OBJECTIVE:    Risk of Unplanned Readmission Score: 15 27         Current admission status: Inpatient       Preferred Pharmacy:   [facility] [address] [facility], [facility]  [address] [facility] [address]  Phone: [phone] Fax: [phone]    Primary Care Provider: Giovany Fernández DO    Primary Insurance: MEDICARE  Secondary Insurance: [place]    ASSESSMENT:  Active Health Care Proxies     Trevin, [address] [facility] Representative - Sister   Primary Phone: [phone] (Mobile)                                                                DISCHARGE DETAILS:                                Samira Melendez requested[de-identified] [address] Name[de-identified] [facility] Provider[de-identified] PCP  Home Health Services Needed[de-identified] Gait/ADL Training  Homebound Criteria Met[de-identified] Uses an Assist Device (i e  cane, walker, etc), Requires the Assistance of Another Person for Safe Ambulation or to Leave the Home  Supporting Clincal Findings[de-identified] Limited Endurance Statement Selected

## 2023-09-19 NOTE — PRE-OP CHECKLIST - DENTURES
no
denies/no
Metronidazole Counseling:  I discussed with the patient the risks of metronidazole including but not limited to seizures, nausea/vomiting, a metallic taste in the mouth, nausea/vomiting and severe allergy.

## 2024-04-29 NOTE — DIETITIAN INITIAL EVALUATION ADULT. - TIME FRAME

## 2025-01-14 NOTE — H&P ADULT - PROBLEM SELECTOR PLAN 3
Current patient location: Patient declined to provide     Is the patient currently in the state of [place]? YES    Visit mode: VIDEO    If the visit is dropped, the patient can be reconnected by:VIDEO VISIT: Text to cell phone:   Telephone Information:   Mobile [phone]       Will anyone else be joining the visit? NO  (If patient encounters technical issues they should call [phone]    Are changes needed to the allergy or medication list? No    Are refills needed on medications prescribed by this physician? NO    Rooming Documentation:  Questionnaire(s) not pre-assigned    Reason for visit: RECHECK (CKD3)    Cele CALZADAF      - IV Ceftriaxone  - Urine culture pending - IV Ceftriaxone x1 in ED - likely continue for now pending urine culture results  - Urine culture pending